# Patient Record
Sex: FEMALE | Race: WHITE | NOT HISPANIC OR LATINO | Employment: PART TIME | URBAN - NONMETROPOLITAN AREA
[De-identification: names, ages, dates, MRNs, and addresses within clinical notes are randomized per-mention and may not be internally consistent; named-entity substitution may affect disease eponyms.]

---

## 2017-01-13 ENCOUNTER — COMMUNICATION - GICH (OUTPATIENT)
Dept: FAMILY MEDICINE | Facility: OTHER | Age: 42
End: 2017-01-13

## 2017-01-13 DIAGNOSIS — E03.9 HYPOTHYROIDISM: ICD-10-CM

## 2017-05-03 ENCOUNTER — HISTORY (OUTPATIENT)
Dept: FAMILY MEDICINE | Facility: OTHER | Age: 42
End: 2017-05-03

## 2017-05-03 ENCOUNTER — OFFICE VISIT - GICH (OUTPATIENT)
Dept: FAMILY MEDICINE | Facility: OTHER | Age: 42
End: 2017-05-03

## 2017-05-03 DIAGNOSIS — Z00.00 ENCOUNTER FOR GENERAL ADULT MEDICAL EXAMINATION WITHOUT ABNORMAL FINDINGS: ICD-10-CM

## 2017-05-03 DIAGNOSIS — E03.8 OTHER SPECIFIED HYPOTHYROIDISM: ICD-10-CM

## 2017-05-03 DIAGNOSIS — E66.09 OTHER OBESITY DUE TO EXCESS CALORIES: ICD-10-CM

## 2017-05-03 DIAGNOSIS — E03.9 HYPOTHYROIDISM: ICD-10-CM

## 2017-05-03 DIAGNOSIS — R73.01 IMPAIRED FASTING GLUCOSE: ICD-10-CM

## 2017-05-03 LAB
GLUCOSE SERPL-MCNC: 96 MG/DL (ref 70–105)
TSH - HISTORICAL: 94.4 UIU/ML (ref 0.34–5.6)

## 2017-05-16 ENCOUNTER — COMMUNICATION - GICH (OUTPATIENT)
Dept: FAMILY MEDICINE | Facility: OTHER | Age: 42
End: 2017-05-16

## 2017-05-17 ENCOUNTER — AMBULATORY - GICH (OUTPATIENT)
Dept: FAMILY MEDICINE | Facility: OTHER | Age: 42
End: 2017-05-17

## 2017-05-17 DIAGNOSIS — R73.01 IMPAIRED FASTING GLUCOSE: ICD-10-CM

## 2017-05-17 DIAGNOSIS — E66.09 OTHER OBESITY DUE TO EXCESS CALORIES: ICD-10-CM

## 2017-07-12 ENCOUNTER — COMMUNICATION - GICH (OUTPATIENT)
Dept: EDUCATION SERVICES | Facility: OTHER | Age: 42
End: 2017-07-12

## 2017-07-15 ENCOUNTER — HISTORY (OUTPATIENT)
Dept: FAMILY MEDICINE | Facility: OTHER | Age: 42
End: 2017-07-15

## 2017-07-15 ENCOUNTER — OFFICE VISIT - GICH (OUTPATIENT)
Dept: FAMILY MEDICINE | Facility: OTHER | Age: 42
End: 2017-07-15

## 2017-07-15 DIAGNOSIS — L03.311 CELLULITIS OF ABDOMINAL WALL: ICD-10-CM
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
## 2017-07-15 LAB
ABSOLUTE BASOPHILS - HISTORICAL: 0.1 THOU/CU MM
ABSOLUTE EOSINOPHILS - HISTORICAL: 0.7 THOU/CU MM
ABSOLUTE IMMATURE GRANULOCYTES(METAS,MYELOS,PROS) - HISTORICAL: 0 THOU/CU MM
ABSOLUTE LYMPHOCYTES - HISTORICAL: 2.9 THOU/CU MM (ref 0.9–2.9)
ABSOLUTE MONOCYTES - HISTORICAL: 0.9 THOU/CU MM
ABSOLUTE NEUTROPHILS - HISTORICAL: 5.2 THOU/CU MM (ref 1.7–7)
BASOPHILS # BLD AUTO: 0.6 %
EOSINOPHIL NFR BLD AUTO: 7.2 %
ERYTHROCYTE [DISTWIDTH] IN BLOOD BY AUTOMATED COUNT: 11.6 % (ref 11.5–15.5)
HCT VFR BLD AUTO: 41.8 % (ref 33–51)
HEMOGLOBIN: 14.5 G/DL (ref 12–16)
IMMATURE GRANULOCYTES(METAS,MYELOS,PROS) - HISTORICAL: 0.2 %
LYMPHOCYTES NFR BLD AUTO: 29.4 % (ref 20–44)
MCH RBC QN AUTO: 30.7 PG (ref 26–34)
MCHC RBC AUTO-ENTMCNC: 34.7 G/DL (ref 32–36)
MCV RBC AUTO: 88 FL (ref 80–100)
MONOCYTES NFR BLD AUTO: 8.9 %
NEUTROPHILS NFR BLD AUTO: 53.7 % (ref 42–72)
PLATELET # BLD AUTO: 278 THOU/CU MM (ref 140–440)
PMV BLD: 9.3 FL (ref 6.5–11)
RED BLOOD COUNT - HISTORICAL: 4.73 MIL/CU MM (ref 4–5.2)
WHITE BLOOD COUNT - HISTORICAL: 9.8 THOU/CU MM (ref 4.5–11)

## 2017-07-18 LAB — LYME SCREEN W/REFLEX WEST BLOT - HISTORICAL: NEGATIVE

## 2017-07-19 LAB
ANAPLASMA PHAGOCYTOPHILUM - HISTORICAL: NEGATIVE
EHRLICHIA CHAFFEENSIS - HISTORICAL: NEGATIVE
EHRLICHIA EWINGII/CANIS - HISTORICAL: NEGATIVE
EHRLICHIA MURIS-LIKE - HISTORICAL: NEGATIVE

## 2017-07-20 ENCOUNTER — OFFICE VISIT - GICH (OUTPATIENT)
Dept: FAMILY MEDICINE | Facility: OTHER | Age: 42
End: 2017-07-20

## 2017-07-20 ENCOUNTER — HISTORY (OUTPATIENT)
Dept: FAMILY MEDICINE | Facility: OTHER | Age: 42
End: 2017-07-20

## 2017-07-20 ENCOUNTER — AMBULATORY - GICH (OUTPATIENT)
Dept: FAMILY MEDICINE | Facility: OTHER | Age: 42
End: 2017-07-20

## 2017-07-20 DIAGNOSIS — N39.0 URINARY TRACT INFECTION: ICD-10-CM

## 2017-07-20 DIAGNOSIS — E66.3 OVERWEIGHT: ICD-10-CM

## 2017-07-20 DIAGNOSIS — R73.02 IMPAIRED GLUCOSE TOLERANCE: ICD-10-CM

## 2017-07-20 DIAGNOSIS — M54.50 LOW BACK PAIN: ICD-10-CM

## 2017-07-20 LAB
BACTERIA URINE: NORMAL BACTERIA/HPF
BILIRUB UR QL: NEGATIVE
CLARITY, URINE: ABNORMAL CLARITY
COLOR UR: YELLOW COLOR
EPITHELIAL CELLS: NORMAL EPI/HPF
GLUCOSE URINE: NEGATIVE MG/DL
KETONES UR QL: NEGATIVE MG/DL
LEUKOCYTE ESTERASE URINE: ABNORMAL
NITRITE UR QL STRIP: NEGATIVE
OCCULT BLOOD,URINE - HISTORICAL: ABNORMAL
PH UR: 6 [PH]
PROTEIN QUALITATIVE,URINE - HISTORICAL: NEGATIVE MG/DL
RBC - HISTORICAL: NORMAL /HPF
SP GR UR STRIP: 1.02
UROBILINOGEN,QUALITATIVE - HISTORICAL: NORMAL EU/DL
WBC - HISTORICAL: NORMAL /HPF

## 2017-08-03 ENCOUNTER — HISTORY (OUTPATIENT)
Dept: FAMILY MEDICINE | Facility: OTHER | Age: 42
End: 2017-08-03

## 2017-08-03 ENCOUNTER — OFFICE VISIT - GICH (OUTPATIENT)
Dept: FAMILY MEDICINE | Facility: OTHER | Age: 42
End: 2017-08-03

## 2017-08-03 DIAGNOSIS — M54.50 LOW BACK PAIN: ICD-10-CM

## 2017-08-03 LAB
BACTERIA URINE: ABNORMAL BACTERIA/HPF
BILIRUB UR QL: NEGATIVE
CLARITY, URINE: CLEAR CLARITY
COLOR UR: YELLOW COLOR
EPITHELIAL CELLS: ABNORMAL EPI/HPF
GLUCOSE URINE: NEGATIVE MG/DL
KETONES UR QL: NEGATIVE MG/DL
LEUKOCYTE ESTERASE URINE: ABNORMAL
NITRITE UR QL STRIP: NEGATIVE
OCCULT BLOOD,URINE - HISTORICAL: ABNORMAL
PH UR: 7 [PH]
PROTEIN QUALITATIVE,URINE - HISTORICAL: NEGATIVE MG/DL
RBC - HISTORICAL: ABNORMAL /HPF
SP GR UR STRIP: 1.02
UROBILINOGEN,QUALITATIVE - HISTORICAL: NORMAL EU/DL
WBC - HISTORICAL: ABNORMAL /HPF

## 2017-08-03 ASSESSMENT — ANXIETY QUESTIONNAIRES
4. TROUBLE RELAXING: NOT AT ALL
5. BEING SO RESTLESS THAT IT IS HARD TO SIT STILL: NOT AT ALL
3. WORRYING TOO MUCH ABOUT DIFFERENT THINGS: NOT AT ALL
1. FEELING NERVOUS, ANXIOUS, OR ON EDGE: NOT AT ALL
6. BECOMING EASILY ANNOYED OR IRRITABLE: SEVERAL DAYS
2. NOT BEING ABLE TO STOP OR CONTROL WORRYING: NOT AT ALL
GAD7 TOTAL SCORE: 1
7. FEELING AFRAID AS IF SOMETHING AWFUL MIGHT HAPPEN: NOT AT ALL

## 2017-08-03 ASSESSMENT — PATIENT HEALTH QUESTIONNAIRE - PHQ9: SUM OF ALL RESPONSES TO PHQ QUESTIONS 1-9: 6
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
## 2017-12-27 NOTE — PROGRESS NOTES
Patient Information     Patient Name MRN Sex Mallorie Winters [id number] Female 1975      Progress Notes by Laura Fournier NP at 7/15/2017  3:45 PM     Author:  Laura Fournier NP Service:  (none) Author Type:  PHYS- Nurse Practitioner     Filed:  2017  8:39 PM Encounter Date:  7/15/2017 Status:  Signed     :  Laura Fournier NP (PHYS- Nurse Practitioner)            HPI:    Mallorie Machado is a 41 y.o. female who presents to clinic today for what she believes is a bug bite she got about 5 days ago, initially it was about the size of a pencil eraser. Seemed to get better and was not aware of it. Today awoke with pain and some sense of itchiness. Rates pain at 8/10, is very uncomfortable. No fever or chills. No body aches, has had a headache for the last 4 days, which is unusual for her. Has taken Excedrin and ibuprofen for the headache and today also the bug bite.  Is not sure what kind of bug, did not feel initially it was a tick bite. Has not been exposed to any bats or larger outdoor animals. Has not been camping, [place]. Does have a slight sore throat. No cough. Slight nausea today but taking fluids well. Is keeping everything down. Also reports fatigue.     Past Medical History:     Diagnosis  Date     Allergic rhinitis due to pollen      Back injury     Cervical disc injury, C5-6, with improvement (work comp related)      Degeneration of cervical intervertebral disc      Dermatitis     scalp      Displacement of cervical intervertebral disc without myelopathy      Dysmenorrhea      Dysthymic disorder      Endometriosis, site unspecified      History of delivery     G2, P2-0-0-2, vaginal deliveries      Lumbago      Obesity      Ovarian cyst rupture      Unspecified asthma      Unspecified hypothyroidism      Past Surgical History:      Procedure  Laterality Date     ARTHROSCOPY  06    left wrist arthroscopy with debridement - Dr. Mahmood,  [facility]        CERVICAL FUSION  2008    C5-6       LAPAROSCOPY ABDOMEN DIAGNOSTIC  6/04 02/07       REMOVAL OF FOREIGN BODY  1/19/2012    ACDF C4-5 and C6-7 with hardware removal [Other][[[       ERIKA AND BSO  11/04    bilateral salpingo-oophorectomy       VAGINAL DELIVERY   x 2     Social History      Substance Use Topics        Smoking status:  Current Every Day Smoker     Types: Cigarettes     Smokeless tobacco:  Never Used     Alcohol use  No     Current Outpatient Prescriptions       Medication  Sig Dispense Refill     benzonatate (TESSALON PERLES) 100 mg capsule Take 1 capsule by mouth 3 times daily if needed for Cough. 30 capsule 0     levothyroxine (SYNTHROID) 100 mcg tablet Take 1 tablet by mouth before breakfast. 90 tablet 3     PROAIR HFA 90 mcg/actuation inhaler INHALE 2 PUFFS AS NEEDED FOR ASTHMA UP TO 4 TIMES DAILY 1 Inhaler 0     No current facility-administered medications for this visit.      Medications have been reviewed by me and are current to the best of my knowledge and ability.    Allergies      Allergen   Reactions     Hydrocodone-Acetaminophen  Itching and Nausea Only     Meperidine  Itching     No hives        Morphine  Erythema     Face & hands      Oxycodone-Acetaminophen  Itching     Propoxyphene-Acetaminophen  Throat Swelling/Closing       ROS:  Refer to HPI, otherwise negative.    EXAM:  General appearance: well appearing lady in no acute distress  Head: normocephalic, atraumatic  Eyes: conjunctivae normal  Orophayrnx: moist mucous membranes, tonsils without erythema or hypertrophy, no exudate or petechia, no post nasal drip.    Neck: supple without adenopathy  Respiratory: clear to auscultation bilaterally  Cardiac: RRR with no murmurs  Musculoskeletal:No inflamed joints.   Dermatological: 5x2 cm area of erythema of left abdomen, also slightly swollen and warm, area is non flocculent. No drainage. 2 very small 1 mm central spots, healed but possibly insect bite.    Psychological: normal affect, alert and pleasant  Results for orders placed or performed in visit on 07/15/17      LYME SCREEN W/REFLEX      Result  Value Ref Range    LYME SCREEN W/REFLEX WEST BLOT Negative Negative   ANAPLASMA      Result  Value Ref Range    ANAPLASMA PHAGOCYTOPHILUM Negative Negative    EHRLICHIA CHAFFEENSIS Negative Negative    EHRLICHIA EWINGII/CANIS Negative Negative    EHRLICHIA MURIS-LIKE Negative Negative   CBC WITH AUTO DIFFERENTIAL      Result  Value Ref Range    WHITE BLOOD COUNT         9.8 4.5 - 11.0 thou/cu mm    RED BLOOD COUNT           4.73 4.00 - 5.20 mil/cu mm    HEMOGLOBIN                14.5 12.0 - 16.0 g/dL    HEMATOCRIT                41.8 33.0 - 51.0 %    MCV                       88 80 - 100 fL    MCH                       30.7 26.0 - 34.0 pg    MCHC                      34.7 32.0 - 36.0 g/dL    RDW                       11.6 11.5 - 15.5 %    PLATELET COUNT            278 140 - 440 thou/cu mm    MPV                       9.3 6.5 - 11.0 fL    NEUTROPHILS               53.7 42.0 - 72.0 %    LYMPHOCYTES               29.4 20.0 - 44.0 %    MONOCYTES                 8.9 <12.0 %    EOSINOPHILS               7.2 <8.0 %    BASOPHILS                 0.6 <3.0 %    IMMATURE GRANULOCYTES(METAS,MYELOS,PROS) 0.2 %    ABSOLUTE NEUTROPHILS      5.2 1.7 - 7.0 thou/cu mm    ABSOLUTE LYMPHOCYTES      2.9 0.9 - 2.9 thou/cu mm    ABSOLUTE MONOCYTES        0.9 (H) <0.9 thou/cu mm    ABSOLUTE EOSINOPHILS      0.7 (H) <0.5 thou/cu mm    ABSOLUTE BASOPHILS        0.1 <0.3 thou/cu mm    ABSOLUTE IMMATURE GRANULOCYTES(METAS,MYELOS,PROS) 0.0 <=0.3 thou/cu mm         ASSESSMENT/PLAN:    ICD-10-CM    1. Bug bite with infection, initial encounter W57.XXXA LYME SCREEN W/REFLEX      ANAPLASMA      LYME SCREEN W/REFLEX      ANAPLASMA      CBC WITH DIFFERENTIAL      doxycycline (VIBRAMYCIN) 100 mg capsule      CBC WITH DIFFERENTIAL      CBC WITH AUTO DIFFERENTIAL   2. Cellulitis, abdominal wall L03.311  doxycycline (VIBRAMYCIN) 100 mg capsule       Plan: Probable insect bit of undetermined nature with subsequent cellulitis/infection.   Discussed with pt, would like to do Lyme and anaplasmosis testing.   Also will get CBC, Rx cellulitis with doxycycline 100 mg BID x 10 days.   Area marked and instructed pt to return to PCP if area is spreading at 2-3 days following antibiotic initiation or if develops new symptoms.   Instructed to follow up with PCP next week also.   See patient instructions. Pt in agreements with plan.  Discussed expected course, Signs and symptoms to return to PCP.   No further questions.       Patient Instructions   Take the antibiotic as prescribed. Antibiotic has been sent to pharmacy. Please take full course of antibiotic even if symptoms have completely resolved. This helps prevent against antibiotic resistance.     This is from a bug bite of some sort so we are treating this local infection with doxycyline 100 mg twice a day for 10 days. This will treat you for anaplasmosis and also Lyme.     Someone will call you with the results if they are positive. I will call you if the blood count is indicating a concern.     Watch for any signs of increasing infection (redness, pus, increased pain or redness, increased swelling or fever). Call if any of these signs occur. Monitor for fevers or chills.    You may draw line around area of redness to monitor area. Please return to clinic if redness is continuing to spread after 2-3 days and follow up with your primary care provider next week if it is not resolving.     For the discomfort you can take tylenol 2 tabs up to 4 times per day. You can also take ibuprofen 2-3 tabs up to 4 times per day, take this with food.     Call or return to clinic as needed if your symptoms worsen including headache, nausea muscle or joint pain etc, or if you fail to improve as anticipated.

## 2017-12-28 NOTE — PROGRESS NOTES
Patient Information     Patient Name MRN Sex Mallorie Winters [id number] Female 1975      Progress Notes by Alyssa Jovel MD at 2017 10:15 AM     Author:  Alyssa Jovel MD Service:  (none) Author Type:  Physician     Filed:  2017  8:34 AM Encounter Date:  2017 Status:  Signed     :  Alyssa Jovel MD (Physician)            Subjective:   Mallorie Machado is a 41 y.o. female c/o urinary symptoms for the past 4 days including:dysuria and flank pain bilaterally She denies: hematuria, incontinence, fever, chills, nausea, vomiting and diarrhea.   Additional hx:   currently pregnant: no  diabetic:no  immunosuppressed: no  Known renal abnormality: no  >4 UTI in past year:no  failure of ABX therapy for UTI in past 4 weeks:no  Use of bubble bath: no  No LMP recorded. Patient has had a hysterectomy.    Current Outpatient Prescriptions:      ciprofloxacin HCl (CIPRO) 250 mg tablet, Take 1 tablet by mouth 2 times daily for 5 days., Disp: 10 tablet, Rfl: 0     doxycycline (VIBRAMYCIN) 100 mg capsule, Take 1 capsule by mouth 2 times daily for 10 days., Disp: 20 capsule, Rfl: 0     levothyroxine (SYNTHROID) 100 mcg tablet, Take 1 tablet by mouth before breakfast., Disp: 90 tablet, Rfl: 3     PROAIR HFA 90 mcg/actuation inhaler, INHALE 2 PUFFS AS NEEDED FOR ASTHMA UP TO 4 TIMES DAILY, Disp: 1 Inhaler, Rfl: 0  Medications have been reviewed by me and are current to the best of my knowledge and ability.    Allergies as of 2017 - Sarath as Reviewed 2017      Allergen  Reaction Noted     Hydrocodone-acetaminophen Itching and Nausea Only 2012     Meperidine Itching 2012     Morphine Erythema 2012     Oxycodone-acetaminophen Itching 2012     Propoxyphene-acetaminophen Throat Swelling/Closing 2012       Objective:   Blood pressure 108/60, pulse 66, weight 69.9 kg (154 lb).  General appearance: healthy, alert and cooperative  Abdominal exam:  abdomen is soft without significant tenderness, masses, organomegaly or guarding    Recent Labs       07/20/17   1022   COLOR  Yellow   CLARITY  Slightly Cloudy A   SPECGRAV  1.025   PHURINE  6.0   UROBILINOGEN  Normal   PROTEINUA  Negative       Recent Labs       07/20/17   1022   GLUCOSEUA  Negative   KETONESUA  Negative   BILIURINE  Negative   BLOODUA  Small A   NITRITE  Negative   LEUKOCYTE  Small A       Assessment:     ICD-10-CM    1. Acute low back pain, unspecified back pain laterality, with sciatica presence unspecified M54.5 URINALYSIS W REFLEX MICROSCOPIC IF POSITIVE      URINALYSIS W REFLEX MICROSCOPIC IF POSITIVE      URINALYSIS MICROSCOPIC      URINALYSIS MICROSCOPIC   2. Urinary tract infection, site unspecified N39.0 ciprofloxacin HCl (CIPRO) 250 mg tablet         Plan:   Medications and lab tests as per orders.   Symptomatic treatment with increased fluids and tylenol over the counter as directed, if desired. Will call pt with culture results (if ordered as above) when available. Pt advised to call the clinic if she has not been notified of results in 2 days.  Repeat urinalysis is not needed, future orders placed accordingly.    Alyssa Posey MD  8:34 AM 7/21/2017

## 2017-12-28 NOTE — PATIENT INSTRUCTIONS
Patient Information     Patient Name MRN Mallorie Rehman [id number] Female 1975      Patient Instructions by Laura Fournier NP at 7/15/2017  3:45 PM     Author:  Laura Fournier NP  Service:  (none) Author Type:  PHYS- Nurse Practitioner     Filed:  7/15/2017  4:45 PM  Encounter Date:  7/15/2017 Status:  Addendum     :  Laura Fournier NP (PHYS- Nurse Practitioner)        Related Notes: Original Note by Laura Fournier NP (PHYS- Nurse Practitioner) filed at 7/15/2017  4:36 PM            Take the antibiotic as prescribed. Antibiotic has been sent to pharmacy. Please take full course of antibiotic even if symptoms have completely resolved. This helps prevent against antibiotic resistance.     This is from a bug bite of some sort so we are treating this local infection with doxycyline 100 mg twice a day for 10 days. This will treat you for anaplasmosis and also Lyme.     Someone will call you with the results if they are positive. I will call you if the blood count is indicating a concern.     Watch for any signs of increasing infection (redness, pus, increased pain or redness, increased swelling or fever). Call if any of these signs occur. Monitor for fevers or chills.    You may draw line around area of redness to monitor area. Please return to clinic if redness is continuing to spread after 2-3 days and follow up with your primary care provider next week if it is not resolving.     For the discomfort you can take tylenol 2 tabs up to 4 times per day. You can also take ibuprofen 2-3 tabs up to 4 times per day, take this with food.     Call or return to clinic as needed if your symptoms worsen including headache, nausea muscle or joint pain etc, or if you fail to improve as anticipated.

## 2017-12-28 NOTE — PROGRESS NOTES
Patient Information     Patient Name MRN Mallorie Rehman [id number] Female 1975      Progress Notes by Klinefelter, Kristin K, RD at 2017  2:00 PM     Author:  Klinefelter, Kristin K, RD Service:  (none) Author Type:  NUTR- Registered Dietitian     Filed:  2017 10:17 AM Encounter Date:  2017 Status:  Signed     :  Klinefelter, Kristin K, RD (NUTR- Registered Dietitian)            MEDICAL NUTRITION THERAPY  SUBSEQUENT VISIT    Assessment:  Client History: Mallorie is here to review her goals for weight managment. At our last visit, her goals were:    Goal: (1) Reduce fat in diet to 42 grams per day. Met 95% of the time. She has significantly changed the foods she choose. She is shopping, cooking, and eating at work differently. She feels these are changes that she can sustain.    (2) 150 minutes of exercise per day.  Met 75% of the time. Barriers include recently being bit by a spider and feeling ill from that.  She has a plan for exercising going into the winter.    (3) document food choices and fat grams. She is keeping track in her head and can list several food items and the fat content. She is now starting to look at sugar and carbs.    (4) eat bfast for metabolism. She has met 100% of the time and says she can continue this.    Weight today: 154# which is down from 171#  Weight loss of 9% is ideal.    Previous Nutrition Diagnosis: Resolved  Energy Balance: Predicted excessive energy intake related to choices and portions as evidenced by recall    Improving nutrition and lifestyle choices as evidenced by reduced weight/BMI, increased activity and eating more frequent, small meals..    New Nutrition Diagnosis:  At risk for diabetes. BMI 27 is improving.  Patient is in action stage of improving her diet and exercise to lose weight.    Intervention:  Meals and Snacks:  General/healthful diet:  Modify composition of meals/snacks: Energy-modified diet  Fat-modified diet  Nutrition  Counseling:  Strategies:  Goal setting  Self-monitoring  Goal: (1) continue 35-42 grams of fat per day.   (2) CHO 30 grams per meal  (3) 150-200 min of exercise per week  (4) weigh weekly. Goal 145-147#          Monitoring and Evaluation:  Food Intake  Weight  Biochemical Data, Medical Tests and Procedures    Follow Up: as needed    Time spent with patient: 30 minutes.  Patient encouraged to call with further questions. Contact information provided.    Kristin K Klinefelter, RD ....................  7/20/2017   10:11 AM

## 2017-12-30 NOTE — NURSING NOTE
Patient Information     Patient Name MRN Mallorie Rehman [id number] Female 1975      Nursing Note by Caroline Oliva at 7/15/2017  3:45 PM     Author:  Caroline Oliva Service:  (none) Author Type:  (none)     Filed:  7/15/2017  4:17 PM Encounter Date:  7/15/2017 Status:  Signed     :  Caroline Oliva            Patient presents to the clinic for a bug bite on her left hip that she noticed about a week ago. She reports painful, tender and burning sensations.  Caroline BIGGS, JOURDAN.......7/15/2017..3:45 PM

## 2017-12-30 NOTE — NURSING NOTE
Patient Information     Patient Name MRN Mallorie Rehman [id number] Female 1975      Nursing Note by Nahed Thomason at 2017 10:15 AM     Author:  Nahed Thomason Service:  (none) Author Type:  (none)     Filed:  2017 10:52 AM Encounter Date:  2017 Status:  Signed     :  Nahed Thomason            Patient is here for concerns of low back pain after going to bathroom. Started Saturday.   Nahed Thomason LPN .............2017  10:12 AM

## 2018-01-02 NOTE — TELEPHONE ENCOUNTER
Patient Information     Patient Name MRN Mallorie Rehman [id number] Female 1975      Telephone Encounter by Georgette Pappas RN at 2017 10:58 AM     Author:  Georgette Pappas RN Service:  (none) Author Type:  NURS- Registered Nurse     Filed:  2017 11:02 AM Encounter Date:  2017 Status:  Signed     :  Georgette Pappas RN (NURS- Registered Nurse)            Patient is due for medication management appointment. Letter was previously sent for reminder to patient. Unable to reach patient via telephone. Left message to call back  ....................Georgette Pappas RN  2017   11:02 AM

## 2018-01-03 NOTE — TELEPHONE ENCOUNTER
Patient Information     Patient Name MRN Mallorie Rehman [id number] Female 1975      Telephone Encounter by Georgette Pappas RN at 2017  3:54 PM     Author:  Georgette Pappas RN Service:  (none) Author Type:  NURS- Registered Nurse     Filed:  2017  3:56 PM Encounter Date:  2017 Status:  Signed     :  Georgette Pappas RN (NURS- Registered Nurse)            Unable to reach patient by phone. Patient is due for follow up. Letter sent with no response. Routing to provider for consideration.    This is a Refill request from: [facility]   Name of Medication: Levothyroxine  Quantity requested: 30  Last fill date: 2016  Due for refill: yes  Last visit with JERARDO NAVARRETE was on: 2016 in [facility]  PCP:  Jerardo Navarrete MD  Controlled Substance Agreement:  n/a   Diagnosis r/t this medication request: Hypothyroidism     Unable to complete prescription refill per RN Medication Refill Policy.................... Georgette Pappas RN ....................  2017   3:55 PM

## 2018-01-04 NOTE — NURSING NOTE
Patient Information     Patient Name MRN Mallorie Rehman [id number] Female 1975      Nursing Note by Bhavya Llanos at 5/3/2017  9:45 AM     Author:  Bhavya Llanos Service:  (none) Author Type:  (none)     Filed:  5/3/2017  9:52 AM Encounter Date:  5/3/2017 Status:  Signed     :  Bhavya Llanos            Coming for a Physical  Bhavya Llanos ....................  5/3/2017   9:41 AM

## 2018-01-04 NOTE — PROGRESS NOTES
Patient Information     Patient Name MRN Sex Mallorie Winters [id number] Female 1975      Progress Notes by Jerardo Navarrete MD at 5/3/2017  9:54 AM     Author:  Jerardo Navarrete MD Service:  (none) Author Type:  Physician     Filed:  5/3/2017 12:39 PM Encounter Date:  5/3/2017 Status:  Signed     :  Jerardo Navarrete MD (Physician)              SUBJECTIVE:    Mallorie Machado is a 41 y.o. female who presents for px    HPI: Wants to look into weight loss.  Has tried exercise, herbalife and brief diet changes.  Stopped her thyroid medications a month ago or so.  Has never met a dietician.    PROBLEM LIST:  Patient Active Problem List     Diagnosis  Code     ASTHMA, INTERMITTENT J45.909     ASTHMA, INTERMITTENT J45.909     OTITIS MEDIA, RIGHT H66.90     TICK BITE W57.XXXA     ALLERGIC RHINITIS, SEASONAL J30.1     DERMATITIS, SCALP L25.9     BACK PAIN, LUMBAR M54.5     DYSMENORRHEA N94.6     WRIST PAIN, LEFT M25.539     ANXIETY DEPRESSION F34.1     HYPOTHYROIDISM E03.9     OBESITY E66.9     DEGENERATIVE DISC DISEASE, CERVICAL SPINE M50.30     HERNIATED CERVICAL DISC M50.20     ENDOMETRIOSIS N80.9     Trigeminal neuritis G50.8     Impaired fasting glucose R73.01     PAST MEDICAL HISTORY:  Past Medical History:     Diagnosis  Date     Allergic rhinitis due to pollen      Back injury     Cervical disc injury, C5-6, with improvement (work comp related)      Degeneration of cervical intervertebral disc      Dermatitis     scalp      Displacement of cervical intervertebral disc without myelopathy      Dysmenorrhea      Dysthymic disorder      Endometriosis, site unspecified      History of delivery     G2, P2-0-0-2, vaginal deliveries      Lumbago      Obesity      Ovarian cyst rupture      Unspecified asthma      Unspecified hypothyroidism      SURGICAL HISTORY:  Past Surgical History:      Procedure  Laterality Date     ARTHROSCOPY  06    left wrist arthroscopy with debridement - Dr. Mahmood,  [facility]        CERVICAL FUSION  2008    C5-6       LAPAROSCOPY ABDOMEN DIAGNOSTIC         REMOVAL OF FOREIGN BODY  2012    ACDF C4-5 and C6-7 with hardware removal [Other][[[       ERIKA AND BSO      bilateral salpingo-oophorectomy       VAGINAL DELIVERY   x 2       SOCIAL HISTORY:  Social History     Social History        Marital status:       Spouse name: N/A     Number of children:  N/A     Years of education:  N/A     Occupational History      Not on file.     Social History Main Topics        Smoking status:  Current Every Day Smoker     Types: Cigarettes     Smokeless tobacco:  Never Used     Alcohol use  No     Drug use:  No     Sexual activity:  Not on file     Other Topics  Concern     Not on file      Social History Narrative     Quit tobacco 2007 using Chantix.       .      Works as an N[employer]. at [employer]. (not working due to neck injury)     Has two children.     Preloaded 12     FAMILY HISTORY:  Family History       Problem   Relation Age of Onset     Diabetes  Mother      Type II       Other  Mother      hypothyroidism       Diabetes  Sister      DM-I,  of splenic injury/infarct        CURRENT MEDICATIONS:   Current Outpatient Prescriptions       Medication  Sig Dispense Refill     benzonatate (TESSALON PERLES) 100 mg capsule Take 1 capsule by mouth 3 times daily if needed for Cough. 30 capsule 0     levothyroxine (SYNTHROID) 100 mcg tablet TAKE 1 TABLET BY MOUTH BEFORE BREAKFAST 30 tablet 0     PROAIR HFA 90 mcg/actuation inhaler INHALE 2 PUFFS AS NEEDED FOR ASTHMA UP TO 4 TIMES DAILY 1 Inhaler 0     No current facility-administered medications for this visit.      Medications have been reviewed by me and are current to the best of my knowledge and ability.    ALLERGIES:  Hydrocodone-acetaminophen; Meperidine; Morphine; Oxycodone-acetaminophen; and Propoxyphene-acetaminophen     REVIEW OF SYSTEMS:  General: denies any general problems.  Eyes: denies  "problems  Ears/Nose/Throat: denies problems  Cardiovascular: denies problems  Respiratory: denies problems  Gastrointestinal: denies problems  Genitourinary: denies problems  Musculoskeletal: denies problems  Skin: denies problems  Neurologic: denies problems  Psychiatric: denies problems  Endocrine: denies problems  Heme/Lymphatic: denies problems  Allergic/Immunologic: denies problems  PHQ Depression Screening 5/3/2017   Date of PHQ exam (doc flow) 5/3/2017   1. Lack of interest/pleasure 0 - Not at all   2. Feeling down/depressed 0 - Not at all   PHQ-2 TOTAL SCORE 0   3. Trouble sleeping -   4. Decreased energy -   5. Appetite change -   6. Feelings of failure -   7. Trouble concentrating -   8. Activity level -   9. Hurting yourself -   PHQ-9 TOTAL SCORE -   PHQ-9 Severity Level -   Functional Impairment -      OBJECTIVE:  /62  Pulse 60  Resp 16  Ht 1.607 m (5' 3.25\")  Wt 77.6 kg (171 lb)  BMI 30.05 kg/m2  EXAM:   General Appearance: Pleasant, alert, appropriate appearance for age. No acute distress  Head Exam: Normal. Normocephalic, atraumatic.  Eye Exam:  Normal external eye, conjunctiva, lids, cornea. THANIA.  Ear Exam: Normal TM's bilaterally. Normal auditory canals and external ears. Non-tender.  Nose Exam: Normal external nose, mucus membranes, and septum.  OroPharynx Exam:  Dental hygiene adequate. Normal buccal mucose. Normal pharynx.  Neck Exam:  Supple, no masses or nodes.  Thyroid Exam: No nodules or enlargement.  Chest/Respiratory Exam: Normal chest wall and respirations. Clear to auscultation.  Breast Exam: No dimpling, nipple retraction or discharge. No masses or nodes.  Cardiovascular Exam: Regular rate and rhythm. S1, S2, no murmur, click, gallop, or rubs.  Gastrointestinal Exam: Soft, non-tender, no masses or organomegaly.  Genitourinary Exam Female: External genitalia, vulva and vagina appear normal. Bimanual exam reveals normal uterus and adnexa, nontender urethra and bladder. Clinical " staff offered to be present for exam: Patient declined  Lymphatic Exam: Non-palpable nodes in neck, clavicular, axillary, or inguinal regions.  Musculoskeletal Exam: Back is straight and non-tender, full ROM of upper and lower extremities.  Foot Exam: Left and right foot: good pedal pulses, no lesions, nail hygiene good.  Skin: no rash or abnormalities  Neurologic Exam: Nonfocal, symmetric DTRs, normal gross motor, tone coordination and no tremor.  Psychiatric Exam: Alert and oriented - appropriate affect.    ASSESSMENT/PLAN    ICD-10-CM    1. Routine general medical examination at a health care facility Z00.00 GYN THIN PREP PAP SCREEN IMAGED   2. Other specified hypothyroidism E03.8 TSH   3. Impaired fasting glucose R73.01 GLUCOSE, FASTING      AMB CONSULT TO DIETICIAN   4. Non morbid obesity due to excess calories E66.09 AMB CONSULT TO DIETICIAN     Ms. Kong Body mass index is 30.05 kg/(m^2). This is out of the normal range for a 41 y.o. Normal range for ages 18+ is between 18.5 and 24.9. To lose weight we reviewed risks and benefits of appropriate options such as diet, exercise, and medications. Patient's strategy will be  formal nutrition program  BP Readings from Last 1 Encounters:05/03/17 : 120/62  Ms. Kong blood pressure is out of the normal range for adults. Per JNC-8 guidelines normal adult blood pressure is < 120/80, pre-hypertensive is between 120/80 and 139/89, and hypertension is 140/90 or greater. Risks of hypertension were discussed. Patient's strategy will be to recheck blood pressure in 12 months      Follow up TSH in 8 weeks, since she has been off medications for over 1 month now.    Jerardo Navarrete MD ....................  5/3/2017   12:38 PM

## 2018-01-05 NOTE — PROGRESS NOTES
"Patient Information     Patient Name MRN Mallorie Rehman [id number] Female 1975      Progress Notes by Klinefelter, Kristin K, RD at 2017 11:00 AM     Author:  Klinefelter, Kristin K, RD Service:  (none) Author Type:  NUTR- Registered Dietitian     Filed:  2017  1:49 PM Encounter Date:  2017 Status:  Signed     :  Klinefelter, Kristin K, RD (NUTR- Registered Dietitian)            MEDICAL NUTRITION THERAPY  INITIAL VISIT      Provider: Dr. Navarrete    Reason for referral: Impaired fasting glucose, overweight BMI 30    Assessment:  Client History: Mallorie would like to prevent DM and lose weight  Estimated energy needs: 1,600 kcal/day  Estimated protein needs: 65 gm/day   She has a family Hx of Type 2 DM (mother and uncle). Has lost weight in the past with herbalife. Does not follow a planned exercise routine. She works 4 days per week as a  and feels too tired after being on her feet. Lives at home with daughter and . Mallorie does the cooking and shopping. They do eat out several times per week including eating at [employer] where she works.     She reports that she is  A \"picky eater\" and does not like veggies.    24hr Diet Recall:  Time: Food/Drink:   bfast None, coffee   lunch At work, burger, eggs/schaeffer, stuffed hasbrowns.   dinner Out (example: [employer]es burger for 85 grams of fat)   snacks Some at home, chips, fruit   beverages 10 oz of milk per week, coffee w/ cream, water     Weekly Activity:  Cardio: Resistance: Stretching:   none none none      Nutrition Diagnosis:  Energy Balance: Excessive energy intake related to estimated needs for age and activity level as evidenced by recall, bmi .    Intervention:  Nutrition Prescription  We discussed portion control at length today. She was unaware of how many fat grams she was taking in. She verbalized understanding and demonstrated knowledge of how to count fat grams for an overall well-rounded diet.  Nutrition " Intervention: Meals and Snacks:  General/healthful diet:  Modify composition of meals/snacks: Energy-modified diet  Fat-modified diet  Nutrition Counseling:  Strategies:  Goal setting  Self-monitoring  Goal: (1) Reduce fat in diet to 42 grams per day  (2) 150 minutes of exercise per day  (3) document food choices and fat grams  (4) eat bfast for metabolism    Education Materials Provided:   AND 1500 calorie meal plan  Food lists  Recipe Ideas  CDC Fat  Worksheets    Monitoring and Evaluation:  Food Intake  Food and Nutrition Knowledge/Skill  Weight    Follow Up: 3 week(s)    Time spent with patient: 60 minutes.   Patient encouraged to call with further questions. Contact information provided.    Kristin K Klinefelter, RD ....................  5/17/2017   1:42 PM

## 2018-01-27 VITALS
HEIGHT: 63 IN | DIASTOLIC BLOOD PRESSURE: 62 MMHG | WEIGHT: 171 LBS | HEART RATE: 60 BPM | RESPIRATION RATE: 16 BRPM | SYSTOLIC BLOOD PRESSURE: 120 MMHG | BODY MASS INDEX: 30.3 KG/M2

## 2018-01-27 VITALS
BODY MASS INDEX: 28.41 KG/M2 | HEIGHT: 62 IN | DIASTOLIC BLOOD PRESSURE: 66 MMHG | TEMPERATURE: 97.8 F | HEART RATE: 68 BPM | WEIGHT: 154.4 LBS | SYSTOLIC BLOOD PRESSURE: 108 MMHG

## 2018-01-27 VITALS
BODY MASS INDEX: 27.9 KG/M2 | HEART RATE: 60 BPM | WEIGHT: 153.8 LBS | DIASTOLIC BLOOD PRESSURE: 74 MMHG | SYSTOLIC BLOOD PRESSURE: 114 MMHG | TEMPERATURE: 97.5 F

## 2018-01-27 VITALS
BODY MASS INDEX: 27.94 KG/M2 | DIASTOLIC BLOOD PRESSURE: 60 MMHG | WEIGHT: 154 LBS | SYSTOLIC BLOOD PRESSURE: 108 MMHG | HEART RATE: 66 BPM

## 2018-01-27 VITALS — WEIGHT: 154 LBS

## 2018-01-30 ENCOUNTER — DOCUMENTATION ONLY (OUTPATIENT)
Dept: FAMILY MEDICINE | Facility: OTHER | Age: 43
End: 2018-01-30

## 2018-01-30 PROBLEM — F34.1 DYSTHYMIC DISORDER: Status: ACTIVE | Noted: 2018-01-30

## 2018-01-30 PROBLEM — J30.1 ALLERGIC RHINITIS DUE TO POLLEN: Status: ACTIVE | Noted: 2018-01-30

## 2018-01-30 PROBLEM — M54.50 LUMBAGO: Status: ACTIVE | Noted: 2018-01-30

## 2018-01-30 PROBLEM — E66.9 OBESITY: Status: ACTIVE | Noted: 2018-01-30

## 2018-01-30 PROBLEM — M25.539 PAIN IN JOINT, FOREARM: Status: ACTIVE | Noted: 2018-01-30

## 2018-01-30 PROBLEM — N94.6 DYSMENORRHEA: Status: ACTIVE | Noted: 2018-01-30

## 2018-01-30 PROBLEM — E03.9 HYPOTHYROIDISM: Status: ACTIVE | Noted: 2018-01-30

## 2018-01-30 PROBLEM — L25.9 CONTACT DERMATITIS AND ECZEMA: Status: ACTIVE | Noted: 2018-01-30

## 2018-01-30 RX ORDER — LEVOTHYROXINE SODIUM 100 UG/1
100 TABLET ORAL
COMMUNITY
Start: 2017-05-03 | End: 2018-05-24

## 2018-01-30 RX ORDER — ALBUTEROL SULFATE 90 UG/1
2 AEROSOL, METERED RESPIRATORY (INHALATION) 4 TIMES DAILY PRN
COMMUNITY
Start: 2016-08-17 | End: 2018-06-15

## 2018-01-30 RX ORDER — MELOXICAM 15 MG/1
15 TABLET ORAL DAILY
COMMUNITY
Start: 2017-08-03 | End: 2018-06-15

## 2018-02-03 ASSESSMENT — PATIENT HEALTH QUESTIONNAIRE - PHQ9: SUM OF ALL RESPONSES TO PHQ QUESTIONS 1-9: 6

## 2018-02-03 ASSESSMENT — ANXIETY QUESTIONNAIRES: GAD7 TOTAL SCORE: 1

## 2018-03-02 ENCOUNTER — OFFICE VISIT (OUTPATIENT)
Dept: FAMILY MEDICINE | Facility: OTHER | Age: 43
End: 2018-03-02
Attending: NURSE PRACTITIONER
Payer: COMMERCIAL

## 2018-03-02 VITALS
TEMPERATURE: 98.1 F | SYSTOLIC BLOOD PRESSURE: 124 MMHG | WEIGHT: 150.19 LBS | DIASTOLIC BLOOD PRESSURE: 70 MMHG | BODY MASS INDEX: 27.25 KG/M2 | HEART RATE: 64 BPM

## 2018-03-02 DIAGNOSIS — J01.00 ACUTE NON-RECURRENT MAXILLARY SINUSITIS: Primary | ICD-10-CM

## 2018-03-02 DIAGNOSIS — J01.10 ACUTE NON-RECURRENT FRONTAL SINUSITIS: ICD-10-CM

## 2018-03-02 PROCEDURE — 99213 OFFICE O/P EST LOW 20 MIN: CPT | Performed by: NURSE PRACTITIONER

## 2018-03-02 RX ORDER — AZITHROMYCIN 250 MG/1
TABLET, FILM COATED ORAL
Qty: 6 TABLET | Refills: 0 | Status: SHIPPED | OUTPATIENT
Start: 2018-03-02 | End: 2018-06-15

## 2018-03-02 NOTE — PATIENT INSTRUCTIONS
Azithromycin daily x 5 days    Humidifier or vaporizier at bedtime    Saline nasal spray during day    Follow up if worsening or concerns

## 2018-03-02 NOTE — PROGRESS NOTES
HPI:    Mallorie Machado is a 42 year old female  who presents to clinic today for sinus.   Symptoms include headaches, sinus pain, nasal drainage, mouth pain, jaw pain, post nasal drainage, and fatigue.   Symptoms worsening for the past week.  Started with thick drainage, lots of pressure in face - nostrils feel swollen and drainage is only trickling out.  States foul odor in nose.  Bloody drainage from nose.  Throat irritated from post nasal drainage.  No chest congestion.  No cough.  States she frequently gets sinus infections after a cold.  No fevers.  Alternating chills and sweats.  Appetite decreased some.  Nauseated yesterday from post nasal drainage.  No vomiting.  Pushing water intake.  Mild ear pain.  Dizziness and light headedness last night.    Reports trying Ibuprofen and allergy med without relief.  Taking Excedrin migraine.  States the Z aron is the only abx that works for her sinus infection.            Past Medical History:   Diagnosis Date     Allergic rhinitis due to pollen     No Comments Provided     Cyst of ovary     1994     Dermatitis     scalp     Dysmenorrhea     No Comments Provided     Dysthymic disorder     No Comments Provided     Endometriosis     No Comments Provided     Hypothyroidism     No Comments Provided     Injury of lower back     02/07,Cervical disc injury, C5-6, with improvement (work comp related)     Low back pain     No Comments Provided     Obesity     No Comments Provided     Other cervical disc degeneration, unspecified cervical region     No Comments Provided     Other cervical disc displacement, unspecified cervical region     No Comments Provided     Personal history of other diseases of the female genital tract     G2, P2-0-0-2, vaginal deliveries     Uncomplicated asthma     No Comments Provided     Past Surgical History:   Procedure Laterality Date     ATTEMPTED ARTHROSCOPY      1/20/06,left wrist arthroscopy with debridement - Dr. Mahmood, [facility]     FUSION  CERVICAL ANTERIOR ONE LEVEL      2008,C5-6     HYSTERECTOMY TOTAL ABDOMINAL, BILATERAL SALPINGO-OOPHORECTOMY, COMBINED      11/04,bilateral salpingo-oophorectomy     LAPAROSCOPY DIAGNOSTIC (GENERAL)      6/04,02/07     OTHER SURGICAL HISTORY       x 2,[id number],VAGINAL DELIVERY     OTHER SURGICAL HISTORY      1/19/2012,205448,REMOVAL OF FOREIGN BODY,ACDF C4-5 and C6-7 with hardware removal [Other][[[     Social History   Substance Use Topics     Smoking status: Current Every Day Smoker     Types: Cigarettes     Smokeless tobacco: Never Used     Alcohol use No     Current Outpatient Prescriptions   Medication Sig Dispense Refill     albuterol (PROAIR HFA) 108 (90 BASE) MCG/ACT Inhaler Inhale 2 puffs into the lungs 4 times daily as needed INHALE 2 PUFFS AS NEEDED FOR ASTHMA UP TO 4 TIMES DAILY       meloxicam (MOBIC) 15 MG tablet Take 15 mg by mouth daily Take 1 tablet by mouth once daily.       levothyroxine (SYNTHROID/LEVOTHROID) 100 MCG tablet Take 100 mcg by mouth every morning (before breakfast) Take 1 tablet by mouth before breakfast       Allergies   Allergen Reactions     Hydrocodone-Acetaminophen Itching and Nausea     Meperidine Itching     No hives     Morphine      Other reaction(s): Erythema  Face & hands     Oxycodone-Acetaminophen Itching     Propoxyphene N-Apap      Other reaction(s): Throat Swelling/Closing         Past medical history, past surgical history, current medications and allergies reviewed and accurate to the best of my knowledge.        ROS:  Refer to HPI    /70 (BP Location: Left arm, Patient Position: Sitting, Cuff Size: Adult Regular)  Pulse 64  Temp 98.1  F (36.7  C) (Tympanic)  Wt 150 lb 3 oz (68.1 kg)  BMI 27.25 kg/m2    EXAM:  General Appearance: Well appearing adult female, appropriate appearance for age. No acute distress  Head: normocephalic, atraumatic  Ears: Left TM grey, translucent with bony landmarks appreciated, no erythema, serous effusion with mild bulging, no  retraction, no purulence.  Right TM grey, translucent with bony landmarks appreciated, no erythema, serous effusion with mild bulging, no retraction, no purulence.  Left auditory canal clear.  Right auditory canal clear.  Normal external ears, non tender.  Eyes: conjunctivae normal without erythema or irritation, no drainage or crusting, no eyelid swelling, pupils equal   Orophayrnx: moist mucous membranes, posterior pharynx without erythema, tonsils without hypertrophy, no erythema, no exudates or petechiae, no post nasal drip seen.    Sinuses:  Generalized sinus tenderness upon palpation of the frontal, maxillary, and ethmoid sinuses  Nose:  Bilateral nares: erythema and diffuse edema, no active drainage or congestion   Neck: supple without adenopathy  Respiratory: normal chest wall and respirations.  Normal effort.  Clear to auscultation bilaterally, no wheezing, crackles or rhonchi.  No increased work of breathing.  No cough appreciated.  Cardiac: RRR with no murmurs  Musculoskeletal:  Normal gait.  Equal movement of bilateral upper extremities.  Equal movement of bilateral lower extremities.    Dermatological: no rashes noted of exposed skin  Psychological: normal affect, alert and pleasant          ASSESSMENT/PLAN:    ICD-10-CM    1. Acute non-recurrent maxillary sinusitis J01.00 azithromycin (ZITHROMAX) 250 MG tablet   2. Acute non-recurrent frontal sinusitis J01.10 azithromycin (ZITHROMAX) 250 MG tablet         Azithromycin daily x 5 days (z aron dosing).  Patient states this is the only abx that works for her sinus infections.      Symptomatic treatment - Encouraged fluids, salt water gargles, honey, elevation, humidifier, sinus rinse/netti pot, saline nasal spray, steamy shower, lozenges, etc     Tylenol or ibuprofen or Excedrin PRN    Follow up if symptoms persist or worsen or concerns

## 2018-03-02 NOTE — MR AVS SNAPSHOT
"              After Visit Summary   3/2/2018    Mallorie Machado    MRN: [id number]           Patient Information     Date Of Birth          1975        Visit Information        Provider Department      3/2/2018 10:45 AM Hazel Cedeño NP [facility]        Today's Diagnoses     Acute non-recurrent maxillary sinusitis    -  1    Acute non-recurrent frontal sinusitis          Care Instructions    Azithromycin daily x 5 days    Humidifier or vaporizier at bedtime    Saline nasal spray during day    Follow up if worsening or concerns          Follow-ups after your visit        Who to contact     If you have questions or need follow up information about today's clinic visit or your schedule please contact [facility] directly at [phone].  Normal or non-critical lab and imaging results will be communicated to you by Farmstrhart, letter or phone within 4 business days after the clinic has received the results. If you do not hear from us within 7 days, please contact the clinic through Farmstrhart or phone. If you have a critical or abnormal lab result, we will notify you by phone as soon as possible.  Submit refill requests through IntenseDebate or call your pharmacy and they will forward the refill request to us. Please allow 3 business days for your refill to be completed.          Additional Information About Your Visit        Farmstrhart Information     IntenseDebate lets you send messages to your doctor, view your test results, renew your prescriptions, schedule appointments and more. To sign up, go to [email or web address] . Click on \"Log in\" on the left side of the screen, which will take you to the Welcome page. Then click on \"Sign up Now\" on the right side of the page.     You will be asked to enter the access code listed below, as well as some personal information. Please follow the directions to create your username and password.     Your access code is: [email or web address]  Expires: " 2018 11:28 AM     Your access code will  in 90 days. If you need help or a new code, please call your [place] clinic or [phone].        Care EveryWhere ID     This is your Care EveryWhere ID. This could be used by other organizations to access your [place] medical records  [id number]        Your Vitals Were     Pulse Temperature BMI (Body Mass Index)             64 98.1  F (36.7  C) (Tympanic) 27.25 kg/m2          Blood Pressure from Last 3 Encounters:   18 124/70   17 114/74   17 108/60    Weight from Last 3 Encounters:   18 150 lb 3 oz (68.1 kg)   17 153 lb 12.8 oz (69.8 kg)   17 154 lb (69.9 kg)              Today, you had the following     No orders found for display         Today's Medication Changes          These changes are accurate as of 3/2/18 11:28 AM.  If you have any questions, ask your nurse or doctor.               Start taking these medicines.        Dose/Directions    azithromycin 250 MG tablet   Commonly known as:  ZITHROMAX   Used for:  Acute non-recurrent frontal sinusitis, Acute non-recurrent maxillary sinusitis   Started by:  Hazel Cedeño NP        Two tablets first day, then one tablet daily for four days.   Quantity:  6 tablet   Refills:  0            Where to get your medicines      [id number] [place], [place] - [address]  [address], [facility] [address]     Phone:  [phone]     azithromycin 250 MG tablet                Primary Care Provider Office Phone # Fax #    Jerardo Navarrete [phone]       [address]  [facility] [address]        Equal Access to Services     SVETLANA RIVERS AH: Eloina Cummings, anabel eden, ketan kaalbambi preston. So [facility] [phone].    ATENCIÓN: Si habla español, tiene a crocker disposición servicios gratuitos de asistencia  lingüísticaTommie Tabor al [phone].    We comply with applicable federal civil rights laws and [place] laws. We do not discriminate on the basis of race, color, national origin, age, disability, sex, sexual orientation, or gender identity.            Thank you!     Thank you for choosing [facility] AND [facility]  for your care. Our goal is always to provide you with excellent care. Hearing back from our patients is one way we can continue to improve our services. Please take a few minutes to complete the written survey that you may receive in the mail after your visit with us. Thank you!             Your Updated Medication List - Protect others around you: Learn how to safely use, store and throw away your medicines at www.disposemymeds.org.          This list is accurate as of 3/2/18 11:28 AM.  Always use your most recent med list.                   Brand Name Dispense Instructions for use Diagnosis    azithromycin 250 MG tablet    ZITHROMAX    6 tablet    Two tablets first day, then one tablet daily for four days.    Acute non-recurrent frontal sinusitis, Acute non-recurrent maxillary sinusitis       levothyroxine 100 MCG tablet    SYNTHROID/LEVOTHROID     Take 100 mcg by mouth every morning (before breakfast) Take 1 tablet by mouth before breakfast        meloxicam 15 MG tablet    MOBIC     Take 15 mg by mouth daily Take 1 tablet by mouth once daily.        PROAIR  (90 BASE) MCG/ACT Inhaler   Generic drug:  albuterol      Inhale 2 puffs into the lungs 4 times daily as needed INHALE 2 PUFFS AS NEEDED FOR ASTHMA UP TO 4 TIMES DAILY

## 2018-03-02 NOTE — NURSING NOTE
Patient presents to the clinic for sinus pain, sinus drainage, fatigue and mouth pain x 1 week. Patient has concerns regarding a sinus infection. She has tried ibuprofen and allergy medication with no relief.  Caroline BIGGS CMA.......3/2/2018..11:10 AM

## 2018-05-24 DIAGNOSIS — E03.9 HYPOTHYROIDISM, UNSPECIFIED TYPE: Primary | ICD-10-CM

## 2018-05-30 RX ORDER — LEVOTHYROXINE SODIUM 100 UG/1
TABLET ORAL
Qty: 30 TABLET | Refills: 0 | Status: SHIPPED | OUTPATIENT
Start: 2018-05-30 | End: 2018-05-30

## 2018-05-30 RX ORDER — LEVOTHYROXINE SODIUM 100 UG/1
100 TABLET ORAL DAILY
Qty: 30 TABLET | Refills: 0 | Status: SHIPPED | OUTPATIENT
Start: 2018-05-30 | End: 2018-06-15

## 2018-05-30 NOTE — TELEPHONE ENCOUNTER
"Requested Prescriptions   Pending Prescriptions Disp Refills     levothyroxine (SYNTHROID/LEVOTHROID) 100 MCG tablet [Pharmacy Med Name: LEVOTHYROXINE 0.100MG (100MCG) TAB] 90 tablet 0     Sig: TAKE 1 TABLET BY MOUTH BEFORE BREAKFAST    Thyroid Protocol Failed    5/24/2018  7:38 AM       Failed - Normal TSH on file in past 12 months    No lab results found.          Passed - Patient is 12 years or older       Passed - Recent (12 mo) or future (30 days) visit within the authorizing provider's specialty    Patient had office visit in the last 12 months or has a visit in the next 30 days with authorizing provider or within the authorizing provider's specialty.  See \"Patient Info\" tab in inbasket, or \"Choose Columns\" in Meds & Orders section of the refill encounter.           Passed - No active pregnancy on record    If patient is pregnant or has had a positive pregnancy test, please check TSH.         Passed - No positive pregnancy test in past 12 months    If patient is pregnant or has had a positive pregnancy test, please check TSH.          Last PMD visit and labs were 5/3/17 and pt was advised to have TSH recehecked in 8 weeks since she had not taken her thyroid medication in over a month.   None found in system RN unable to refill does not meet protocol.    Pharmacy: [facility]  Medication: Synthroid 100 mcg 1 tab QD  # 90 Refills # 0  Last filled : 4/19/18  Mabel Almazan, RN on 5/30/2018 at 9:03 AM   "

## 2018-05-30 NOTE — TELEPHONE ENCOUNTER
"Refill request from [facility] for   levothyroxine (SYNTHROID/LEVOTHROID) 100 MCG tablet      LOV 8/3/2017 with Jerardo Navarrete MD    Note from OV 5/3/2017  Patient had been off of medication for over 1 month and was advised to return in 8 weeks for a recheck TSH as it was grossly elevated (94.4).      Contacted patient and relayed to them that they are overdue for a recheck exam.  Patient was appreciative.  Transferred to scheduling.  Appointment noted for 6/15/2018 with Jerardo Navarrete MD    Limited refill given    Requested Prescriptions   Pending Prescriptions Disp Refills     levothyroxine (SYNTHROID/LEVOTHROID) 100 MCG tablet [Pharmacy Med Name: LEVOTHYROXINE 0.100MG (100MCG) TAB] 90 tablet 0     Sig: TAKE 1 TABLET BY MOUTH BEFORE BREAKFAST    Thyroid Protocol Failed    5/30/2018  9:08 AM       Failed - Normal TSH on file in past 12 months    No lab results found.          Passed - Patient is 12 years or older       Passed - Recent (12 mo) or future (30 days) visit within the authorizing provider's specialty    Patient had office visit in the last 12 months or has a visit in the next 30 days with authorizing provider or within the authorizing provider's specialty.  See \"Patient Info\" tab in inbasket, or \"Choose Columns\" in Meds & Orders section of the refill encounter.           Passed - No active pregnancy on record    If patient is pregnant or has had a positive pregnancy test, please check TSH.         Passed - No positive pregnancy test in past 12 months    If patient is pregnant or has had a positive pregnancy test, please check TSH.          Syeda Barragan RN  ....................  5/30/2018   9:39 AM      "

## 2018-06-15 ENCOUNTER — TELEPHONE (OUTPATIENT)
Dept: FAMILY MEDICINE | Facility: OTHER | Age: 43
End: 2018-06-15

## 2018-06-15 ENCOUNTER — OFFICE VISIT (OUTPATIENT)
Dept: FAMILY MEDICINE | Facility: OTHER | Age: 43
End: 2018-06-15
Attending: FAMILY MEDICINE
Payer: COMMERCIAL

## 2018-06-15 VITALS
HEART RATE: 60 BPM | HEIGHT: 63 IN | SYSTOLIC BLOOD PRESSURE: 118 MMHG | BODY MASS INDEX: 27.04 KG/M2 | WEIGHT: 152.6 LBS | DIASTOLIC BLOOD PRESSURE: 70 MMHG

## 2018-06-15 DIAGNOSIS — J45.20 MILD INTERMITTENT ASTHMA, UNSPECIFIED WHETHER COMPLICATED: Primary | ICD-10-CM

## 2018-06-15 DIAGNOSIS — J30.1 CHRONIC SEASONAL ALLERGIC RHINITIS DUE TO POLLEN: ICD-10-CM

## 2018-06-15 DIAGNOSIS — E03.9 HYPOTHYROIDISM, UNSPECIFIED TYPE: ICD-10-CM

## 2018-06-15 LAB — TSH SERPL DL<=0.05 MIU/L-ACNC: 5.33 IU/ML (ref 0.34–5.6)

## 2018-06-15 PROCEDURE — 84443 ASSAY THYROID STIM HORMONE: CPT | Performed by: FAMILY MEDICINE

## 2018-06-15 PROCEDURE — 99214 OFFICE O/P EST MOD 30 MIN: CPT | Performed by: FAMILY MEDICINE

## 2018-06-15 PROCEDURE — 36415 COLL VENOUS BLD VENIPUNCTURE: CPT | Performed by: FAMILY MEDICINE

## 2018-06-15 RX ORDER — ALBUTEROL SULFATE 90 UG/1
2 AEROSOL, METERED RESPIRATORY (INHALATION) 4 TIMES DAILY PRN
Qty: 1 INHALER | Refills: 3 | Status: SHIPPED | OUTPATIENT
Start: 2018-06-15 | End: 2021-07-13

## 2018-06-15 RX ORDER — LEVOTHYROXINE SODIUM 100 UG/1
100 TABLET ORAL DAILY
Qty: 90 TABLET | Refills: 3 | Status: SHIPPED | OUTPATIENT
Start: 2018-06-15 | End: 2019-07-29

## 2018-06-15 ASSESSMENT — ENCOUNTER SYMPTOMS
SHORTNESS OF BREATH: 0
TROUBLE SWALLOWING: 0
NAUSEA: 0
VOMITING: 0
VOICE CHANGE: 0
ACTIVITY CHANGE: 0
MYALGIAS: 1
CONSTIPATION: 0
RHINORRHEA: 1
WOUND: 0
COUGH: 1
COLOR CHANGE: 1
WEAKNESS: 0
SINUS PRESSURE: 0
SORE THROAT: 0
ABDOMINAL PAIN: 0
SINUS PAIN: 0
FEVER: 0
PALPITATIONS: 0
DIARRHEA: 0
APPETITE CHANGE: 0
HEADACHES: 0
FATIGUE: 0

## 2018-06-15 ASSESSMENT — PAIN SCALES - GENERAL: PAINLEVEL: MODERATE PAIN (4)

## 2018-06-15 NOTE — NURSING NOTE
Patient presents in the clinic for a physical and medication renewal, she does state that she fell down her deck on Wednesday and hit her right hip and arm.   Grace Beck LPN 6/15/2018 8:14 AM

## 2018-06-15 NOTE — PROGRESS NOTES
SUBJECTIVE:   Mallorie Machado is a 42 year old female who presents to clinic today for the following health issues: med refill    HPI  Mallorie Machado is a pleasant 42 year old female with hypothyroidism who presents today for thyroid follow up. She has been doing well, adequate energy and normal BMs. A lot of allergies right now causing some post nasal drip leading to a cough. She uses claritin daily. She also fell off the back of her deck on Wednesday which caused a bruise on her right hip but the pain is well controlled and she does not think she broke anything.     Has episodes of crampy chest pain that occurs under her left breast and last for a few seconds and then disappears without intervention. It occurs usually at rest. She feels it is more of a muscle cramp sensation than real chest pain. No SOB.     Patient Active Problem List    Diagnosis Date Noted     Allergic rhinitis due to pollen 01/30/2018     Priority: Medium     Dysthymic disorder 01/30/2018     Priority: Medium     Lumbago 01/30/2018     Priority: Medium     Overview:   mechanical       Contact dermatitis and eczema 01/30/2018     Priority: Medium     Overview:   recurrent       Dysmenorrhea 01/30/2018     Priority: Medium     Overview:   pelvic pain       Hypothyroidism 01/30/2018     Priority: Medium     Obesity 01/30/2018     Priority: Medium     Overview:   Current BMI 29; abdominal girth greater than 35 inches 05/2007       Pain in joint, forearm 01/30/2018     Priority: Medium     Overview:   chronic       Impaired fasting glucose 09/13/2013     Priority: Medium     Trigeminal neuritis 04/20/2013     Priority: Medium     Herniated cervical disc 01/13/2012     Priority: Medium     Asthma 09/14/2011     Priority: Medium     Tick bite 05/23/2011     Priority: Medium     Otitis media, right 12/20/2010     Priority: Medium     Endometriosis 10/29/2010     Priority: Medium     Degeneration of cervical intervertebral disc 07/27/2010      Priority: Medium     Past Surgical History:   Procedure Laterality Date     ATTEMPTED ARTHROSCOPY      1/20/06,left wrist arthroscopy with debridement - Dr. Mahmood, [place]     FUSION CERVICAL ANTERIOR ONE LEVEL      2008,C5-6     HYSTERECTOMY TOTAL ABDOMINAL, BILATERAL SALPINGO-OOPHORECTOMY, COMBINED      11/04,bilateral salpingo-oophorectomy     LAPAROSCOPY DIAGNOSTIC (GENERAL)      6/04,02/07     OTHER SURGICAL HISTORY       x 2,[id number],VAGINAL DELIVERY     OTHER SURGICAL HISTORY      1/19/2012,205448,REMOVAL OF FOREIGN BODY,ACDF C4-5 and C6-7 with hardware removal [Other][[[     Social History   Substance Use Topics     Smoking status: Current Every Day Smoker     Packs/day: 0.50     Types: Cigarettes     Smokeless tobacco: Never Used     Alcohol use No     Current Outpatient Prescriptions   Medication Sig Dispense Refill     albuterol (PROAIR HFA) 108 (90 BASE) MCG/ACT Inhaler Inhale 2 puffs into the lungs 4 times daily as needed INHALE 2 PUFFS AS NEEDED FOR ASTHMA UP TO 4 TIMES DAILY       levothyroxine (SYNTHROID/LEVOTHROID) 100 MCG tablet Take 1 tablet (100 mcg) by mouth daily 30 tablet 0     Allergies   Allergen Reactions     Hydrocodone-Acetaminophen Itching and Nausea     Meperidine Itching     No hives     Morphine      Other reaction(s): Erythema  Face & hands     Oxycodone-Acetaminophen Itching     Propoxyphene N-Apap      Other reaction(s): Throat Swelling/Closing       Review of Systems   Constitutional: Negative for activity change, appetite change, fatigue and fever.   HENT: Positive for postnasal drip and rhinorrhea. Negative for sinus pain, sinus pressure, sore throat, trouble swallowing and voice change.    Respiratory: Positive for cough. Negative for shortness of breath.    Cardiovascular: Positive for chest pain. Negative for palpitations and peripheral edema.   Gastrointestinal: Negative for abdominal pain, constipation, diarrhea, nausea and vomiting.   Musculoskeletal: Positive for  "myalgias.   Skin: Positive for color change. Negative for rash and wound.   Neurological: Negative for weakness and headaches.      OBJECTIVE:     /70 (BP Location: Right arm, Patient Position: Sitting, Cuff Size: Adult Regular)  Pulse 60  Ht 5' 2.5\" (1.588 m)  Wt 152 lb 9.6 oz (69.2 kg)  BMI 27.47 kg/m2  Body mass index is 27.47 kg/(m^2).  Physical Exam   Constitutional: She is oriented to person, place, and time. She appears well-developed and well-nourished. No distress.   HENT:   Head: Normocephalic and atraumatic.   Right Ear: External ear normal.   Left Ear: External ear normal.   Mouth/Throat: Oropharynx is clear and moist. No oropharyngeal exudate.   Cobblestoning present    Eyes: Right eye exhibits no discharge. Left eye exhibits no discharge.   Neck: No thyromegaly present.   Cardiovascular: Normal rate, regular rhythm and normal heart sounds.  Exam reveals no gallop and no friction rub.    No murmur heard.  Pulmonary/Chest: Effort normal and breath sounds normal. She has no wheezes.   Abdominal: Soft. There is no tenderness. There is no rebound and no guarding.   Musculoskeletal: Normal range of motion. She exhibits no edema or deformity.   Lymphadenopathy:     She has no cervical adenopathy.   Neurological: She is alert and oriented to person, place, and time.   Skin: She is not diaphoretic.   Ecchymosis over the right lateral hip   Psychiatric: She has a normal mood and affect. Her behavior is normal.       Diagnostic Test Results:    ASSESSMENT/PLAN:           (E03.9) Hypothyroidism, unspecified type  Comment: Chronic  Plan: levothyroxine (SYNTHROID/LEVOTHROID) 100 MCG         tablet, TSH        Will recheck her TSH today and refill her medication. Clinically, she is doing well.     (J30.1) Chronic seasonal allergic rhinitis due to pollen  Comment: Chronic  Plan: Discussed the use of OTC nasal sprays for better relief along with the daily claritin.     (J45.20) Mild intermittent asthma, " unspecified whether complicated  (primary encounter diagnosis)  Comment:  stable  Plan: albuterol (PROAIR HFA) 108 (90 Base) MCG/ACT         Inhaler        Only uses her inhaler a few times a year    Forwarded to Dr. Navarrete for review.  Xiomara Ortega on 6/15/2018 at 8:40 AM    Results for orders placed or performed in visit on 06/15/18   TSH   Result Value Ref Range    Thyrotropin 5.33 0.34 - 5.60 IU/mL     Pt was seen and examined by me as well as Xiomara Ortega, MS 3.  See her note for details.    Jerardo Navarrete MD on 6/18/2018 at 7:28 AM

## 2018-06-15 NOTE — MR AVS SNAPSHOT
"              After Visit Summary   6/15/2018    Mallorie Machado    MRN: [id number]           Patient Information     Date Of Birth          1975        Visit Information        Provider Department      6/15/2018 8:15 AM Jerardo Navarrete MD [facility]        Today's Diagnoses     Mild intermittent asthma, unspecified whether complicated    -  1    Hypothyroidism, unspecified type        Chronic seasonal allergic rhinitis due to pollen           Follow-ups after your visit        Who to contact     If you have questions or need follow up information about today's clinic visit or your schedule please contact [facility] [place] [facility]\"" directly at [phone].  Normal or non-critical lab and imaging results will be communicated to you by true[x] Mediahart, letter or phone within 4 business days after the clinic has received the results. If you do not hear from us within 7 days, please contact the clinic through true[x] Mediahart or phone. If you have a critical or abnormal lab result, we will notify you by phone as soon as possible.  Submit refill requests through [employer] or call your pharmacy and they will forward the refill request to us. Please allow 3 business days for your refill to be completed.          Additional Information About Your Visit        MyChart Information     [employer] lets you send messages to your doctor, view your test results, renew your prescriptions, schedule appointments and more. To sign up, go to [email or web address] . Click on \"Log in\" on the left side of the screen, which will take you to the Welcome page. Then click on \"Sign up Now\" on the right side of the page.     You will be asked to enter the access code listed below, as well as some personal information. Please follow the directions to create your username and password.     Your access code is: [email or web address]  Expires: 2018  7:28 AM     Your access code will  in 90 days. If you need help or a new code, please " "call your [facility] clinic or [phone].        Care EveryWhere ID     This is your Care EveryWhere ID. This could be used by other organizations to access your [facility] medical records  [id number]        Your Vitals Were     Pulse Height BMI (Body Mass Index)             60 5' 2.5\" (1.588 m) 27.47 kg/m2          Blood Pressure from Last 3 Encounters:   06/15/18 118/70   03/02/18 124/70   08/03/17 114/74    Weight from Last 3 Encounters:   06/15/18 152 lb 9.6 oz (69.2 kg)   03/02/18 150 lb 3 oz (68.1 kg)   08/03/17 153 lb 12.8 oz (69.8 kg)              We Performed the Following     TSH          Where to get your medicines      These medications were sent to [id number] - [place], [place] - [address]  [address], [facility] [address]     Phone:  [phone]     albuterol 108 (90 Base) MCG/ACT Inhaler    levothyroxine 100 MCG tablet          Primary Care Provider Office Phone # Fax #    Jerardo Navarrete [phone]       [address] [facility] [address]        Equal Access to Services     SVETLANA RIVERS : Hadii lorraine angel hadasho Soomaali, waaxda luqadaha, qaybta kaalmada adeegyada, bambi goldstein. So [facility] [phone].    ATENCIÓN: Si habla español, tiene a crocker disposición servicios gratuitos de asistencia lingüística. Llame al [phone].    We comply with applicable federal civil rights laws and [place] laws. We do not discriminate on the basis of race, color, national origin, age, disability, sex, sexual orientation, or gender identity.            Thank you!     Thank you for choosing [facility]  for your care. Our goal is always to provide you with excellent care. Hearing back from our patients is one way we can continue to improve our services. Please take a few minutes to complete the written survey that you may receive in the mail after your visit with us. Thank you!           "   Your Updated Medication List - Protect others around you: Learn how to safely use, store and throw away your medicines at www.disposemymeds.org.          This list is accurate as of 6/15/18 11:59 PM.  Always use your most recent med list.                   Brand Name Dispense Instructions for use Diagnosis    albuterol 108 (90 Base) MCG/ACT Inhaler    PROAIR HFA    1 Inhaler    Inhale 2 puffs into the lungs 4 times daily as needed INHALE 2 PUFFS AS NEEDED FOR ASTHMA UP TO 4 TIMES DAILY    Mild intermittent asthma, unspecified whether complicated       levothyroxine 100 MCG tablet    SYNTHROID/LEVOTHROID    90 tablet    Take 1 tablet (100 mcg) by mouth daily    Hypothyroidism, unspecified type

## 2018-06-15 NOTE — TELEPHONE ENCOUNTER
Patient returned call for lab results. Patient notified that TSH was normal.  Jesusita Arredondo LPN...................6/15/2018  2:48 PM

## 2018-06-15 NOTE — LETTER
Nancy 15, 2018      Mallorie Machado  [address] [facility] [address]        Dear Mallorie,     The lab looks normal.  Keep taking the med.    Results for orders placed or performed in visit on 06/15/18   TSH   Result Value Ref Range    Thyrotropin 5.33 0.34 - 5.60 IU/mL           Sincerely,        Jerardo Navarrete MD

## 2018-06-16 ASSESSMENT — ASTHMA QUESTIONNAIRES: ACT_TOTALSCORE: 21

## 2018-07-23 NOTE — PROGRESS NOTES
"Patient Information     Patient Name  Mallorie Machado MRN  [id number] Sex  Female   1975      Letter by Jerardo Navarrete MD at      Author:  Jerardo Navarrete MD Service:  (none) Author Type:  (none)    Filed:   Encounter Date:  2017 Status:  (Other)       Remember to activate your account quickly -   Your activation code expires in 45 days!    May 16, 2017    Mallorie Machado  [address] [facility] [address]    Dear Ms. Machado,    When you activate your [facility] account online you ll have more ways to communicate with your clinic, hospital and care team; get answers faster and be able to keep your health information organized and at your fingertips.    To activate your account, you will need:     to visit [employer]    your activation code:  [email or web address]    Expires: 2017  1:37 PM    the last four digits of your Social Security number (SSN)    your date of birth.      Step-by-step instructions on how to set up your account are shown on the following page.  If you requested access to a child s or another adult s interactive health record, instructions for viewing their information are also on the following page.     For questions or technical assistance, call [phone].      In [employer]                                                Account activation instructions    Activation code: [email or web address]  Expires: 2017  1:37 PM    Step 1:   Go to [employer]. Hover over My Account in the top navigation and click on Create an Account from the drop down menu.    Step 2:   Click on I have an activation code  .  Step 3:   Type in your activation code (provided above), the last four digits of your Social Security number (SSN) and date of birth. This information is only required once.  The next time you sign in to your account you will only need your username and password. If you receive an error that states \"We were unable to find a match,\" that means the " information we have on file does not match the information entered. After three failed attempts, please call [phone] for assistance.    Step 4:   Enter your email address, if you already have a [facility] account. Or click I don t have this account to bypass this step.    Step 5: Review and confirm the information on the screen that you would like to use for your account. Be sure to use a valid email address; you will be sent a confirmation email to confirm your account.    Step 6: Choose a username that is easy for you to remember, but hard for others to guess.  Your username must:    be between five and 24 characters    contain only letters and numbers (no symbols).      Step 7:   Choose a password. Your password must:    be at least eight characters    contain at least one uppercase letter    contain one lowercase letter    contain one number or symbol    be different than your username.    Step 8:  Review your account information and accept the terms and conditions.    Step 9:  Check your email for a message from [employer] to confirm your account within 24 hours.    Step 10:  After confirming your account, you can now sign in on [email or web address] by selecting My Account.     Health record proxy access    If you requested proxy access for a child s or another adult s interactive health record, you will be able to access their health record once you sign in and select a task (test results, messages, appointments, etc.) from the home page navigation.

## 2018-07-23 NOTE — PROGRESS NOTES
Patient Information     Patient Name  Mallorie Machado MRN  [id number] Sex  Female   1975      Letter by Jerardo Navarrete MD at      Author:  Jerardo Navarrete MD Service:  (none) Author Type:  (none)    Filed:   Encounter Date:  5/3/2017 Status:  (Other)           Mallorie Machado  [address] [facility] [address]          May 5, 2017    Dear Ms. Machado:    The result from the Pap test(s) you had done at your recent clinic visit came back as normal.     We recommend that you have an adult physical exam each year. Depending on your Pap test history, you may or may not need a Pap test at these visits.  You and your health care provider will decide what is right for you.    If you have any further questions or concerns, please call  [phone]. You may also contact us by using medical messaging if you have MyChart.    Thank you for choosing [facility] to participate in your healthcare needs.    Sincerely,    Jerardo Navarrete MD          The [employer] Screening Program is a statewide comprehensive breast and cervical cancer screening program that provides free screening and follow-up services (including colposcopy) to uninsured and underinsured women. For more information call toll free [phone] ([phone])

## 2018-07-24 NOTE — PROGRESS NOTES
Patient Information     Patient Name  Mallorie Machado MRN  [id number] Sex  Female   1975      Letter by Jerardo Navarrete MD at      Author:  Jerardo Navarrete MD Service:  (none) Author Type:  (none)    Filed:   Encounter Date:  5/3/2017 Status:  (Other)           Mallorie Machado  [address] [facility] [address]          May 3, 2017    Dear Ms. Machado:    Following are the tests completed during your last clinic visit.      Results for orders placed or performed in visit on 17      TSH      Result  Value Ref Range    TSH 94.40 (H) 0.34 - 5.60 uIU/mL   GLUCOSE, FASTING      Result  Value Ref Range    GLUCOSE 96 70 - 105 mg/dL         If you have any further questions or problems contact my office at  [phone].    Thank you,    Jerardo Navarrete MD

## 2019-07-29 DIAGNOSIS — E03.9 HYPOTHYROIDISM, UNSPECIFIED TYPE: Primary | ICD-10-CM

## 2019-07-29 RX ORDER — LEVOTHYROXINE SODIUM 100 UG/1
100 TABLET ORAL DAILY
Qty: 90 TABLET | Refills: 0 | Status: SHIPPED | OUTPATIENT
Start: 2019-07-29 | End: 2019-08-13

## 2019-07-29 NOTE — TELEPHONE ENCOUNTER
Chrissie SINGH sent Rx request for the following:    Patient OUT OF MEDICATION.     levothyroxine (SYNTHROID/LEVOTHROID) 100 MCG tablet  Sig: Take 1 tablet (100 mcg) by mouth daily  Last Prescription Date:   6/15/18  Last Fill Qty/Refills:         90, R-3    Last Office Visit:              6/15/18  Future Office visit:           None.    Thyroid Protocol Failed7/29 2:04 PM   Recent (12 mo) or future (30 days) visit within the authorizing provider's specialty    Normal TSH on file in past 12 months     Patient due for annual exam and labs. Reminder letter sent. Prescription approved per [facility] Refill Protocol for 90-day damien refill. Zaida Mercado RN .............. 7/29/2019  2:06 PM

## 2019-07-29 NOTE — LETTER
July 29, 2019      Mallorie Machdao  [address] [address] [facility] [address]        Dear Mallorie,     This letter is to remind you that you are due for your annual exam with Jerardo Navarrete. Your last comprehensive visit was more than 12 months ago.    A LIMITED refill of Levothyroxine (SYNTHROID/LEVOTHROID) 100 MCG tablet has been called into your pharmacy. Additional refills require you to complete this appointment.    Please call the clinic at [phone] to schedule your appointment.    If you should require additional refills before your scheduled appointment, please contact your pharmacy and we will refill your medication until the date of your appointment.    If you are no longer seeing Jerardo Navarrete for primary care, please call to let us know. Doing so will remove you from our call/contact list.      Thank you for choosing [facility] and [facility] for your health care needs.    Sincerely,    Refill RN  [facility]

## 2019-07-29 NOTE — TELEPHONE ENCOUNTER
No prior requests noted.    Refill request initiated and routed to [employer], high priority, to run through refill protocol. Zaida Mercado RN .............. 7/29/2019  2:04 PM

## 2019-08-13 ENCOUNTER — OFFICE VISIT (OUTPATIENT)
Dept: FAMILY MEDICINE | Facility: OTHER | Age: 44
End: 2019-08-13
Attending: FAMILY MEDICINE
Payer: COMMERCIAL

## 2019-08-13 VITALS
DIASTOLIC BLOOD PRESSURE: 60 MMHG | RESPIRATION RATE: 14 BRPM | TEMPERATURE: 98.6 F | WEIGHT: 160 LBS | BODY MASS INDEX: 28.35 KG/M2 | HEART RATE: 67 BPM | SYSTOLIC BLOOD PRESSURE: 118 MMHG | OXYGEN SATURATION: 96 % | HEIGHT: 63 IN

## 2019-08-13 DIAGNOSIS — R73.01 IMPAIRED FASTING GLUCOSE: ICD-10-CM

## 2019-08-13 DIAGNOSIS — Z72.0 TOBACCO ABUSE: ICD-10-CM

## 2019-08-13 DIAGNOSIS — E03.9 HYPOTHYROIDISM, UNSPECIFIED TYPE: ICD-10-CM

## 2019-08-13 DIAGNOSIS — Z00.00 ROUTINE GENERAL MEDICAL EXAMINATION AT A HEALTH CARE FACILITY: Primary | ICD-10-CM

## 2019-08-13 PROBLEM — F34.1 DYSTHYMIC DISORDER: Status: RESOLVED | Noted: 2018-01-30 | Resolved: 2019-08-13

## 2019-08-13 LAB
CHOLEST SERPL-MCNC: 227 MG/DL
GLUCOSE SERPL-MCNC: 85 MG/DL (ref 70–105)
HBA1C MFR BLD: 6 % (ref 4–6)
HDLC SERPL-MCNC: 52 MG/DL (ref 23–92)
LDLC SERPL CALC-MCNC: 154 MG/DL
NONHDLC SERPL-MCNC: 175 MG/DL
TRIGL SERPL-MCNC: 104 MG/DL
TSH SERPL DL<=0.05 MIU/L-ACNC: 15.74 IU/ML (ref 0.34–5.6)

## 2019-08-13 PROCEDURE — 82947 ASSAY GLUCOSE BLOOD QUANT: CPT | Mod: ZL | Performed by: FAMILY MEDICINE

## 2019-08-13 PROCEDURE — 99396 PREV VISIT EST AGE 40-64: CPT | Performed by: FAMILY MEDICINE

## 2019-08-13 PROCEDURE — 36415 COLL VENOUS BLD VENIPUNCTURE: CPT | Mod: ZL | Performed by: FAMILY MEDICINE

## 2019-08-13 PROCEDURE — 83036 HEMOGLOBIN GLYCOSYLATED A1C: CPT | Mod: ZL | Performed by: FAMILY MEDICINE

## 2019-08-13 PROCEDURE — 80061 LIPID PANEL: CPT | Mod: ZL | Performed by: FAMILY MEDICINE

## 2019-08-13 PROCEDURE — 84443 ASSAY THYROID STIM HORMONE: CPT | Mod: ZL | Performed by: FAMILY MEDICINE

## 2019-08-13 RX ORDER — LEVOTHYROXINE SODIUM 100 UG/1
100 TABLET ORAL DAILY
Qty: 90 TABLET | Refills: 3 | Status: SHIPPED | OUTPATIENT
Start: 2019-08-13 | End: 2019-08-15

## 2019-08-13 RX ORDER — BUPROPION HYDROCHLORIDE 150 MG/1
150 TABLET ORAL EVERY MORNING
Qty: 90 TABLET | Refills: 0 | Status: SHIPPED | OUTPATIENT
Start: 2019-08-13 | End: 2019-11-19

## 2019-08-13 ASSESSMENT — ASTHMA QUESTIONNAIRES
ACUTE_EXACERBATION_TODAY: NO
ACT_TOTALSCORE: 24
QUESTION_3 LAST FOUR WEEKS HOW OFTEN DID YOUR ASTHMA SYMPTOMS (WHEEZING, COUGHING, SHORTNESS OF BREATH, CHEST TIGHTNESS OR PAIN) WAKE YOU UP AT NIGHT OR EARLIER THAN USUAL IN THE MORNING: NOT AT ALL
QUESTION_5 LAST FOUR WEEKS HOW WOULD YOU RATE YOUR ASTHMA CONTROL: WELL CONTROLLED
QUESTION_4 LAST FOUR WEEKS HOW OFTEN HAVE YOU USED YOUR RESCUE INHALER OR NEBULIZER MEDICATION (SUCH AS ALBUTEROL): NOT AT ALL
QUESTION_2 LAST FOUR WEEKS HOW OFTEN HAVE YOU HAD SHORTNESS OF BREATH: NOT AT ALL
QUESTION_1 LAST FOUR WEEKS HOW MUCH OF THE TIME DID YOUR ASTHMA KEEP YOU FROM GETTING AS MUCH DONE AT WORK, SCHOOL OR AT HOME: NONE OF THE TIME

## 2019-08-13 ASSESSMENT — ANXIETY QUESTIONNAIRES
2. NOT BEING ABLE TO STOP OR CONTROL WORRYING: NOT AT ALL
GAD7 TOTAL SCORE: 0
IF YOU CHECKED OFF ANY PROBLEMS ON THIS QUESTIONNAIRE, HOW DIFFICULT HAVE THESE PROBLEMS MADE IT FOR YOU TO DO YOUR WORK, TAKE CARE OF THINGS AT HOME, OR GET ALONG WITH OTHER PEOPLE: NOT DIFFICULT AT ALL
3. WORRYING TOO MUCH ABOUT DIFFERENT THINGS: NOT AT ALL
6. BECOMING EASILY ANNOYED OR IRRITABLE: NOT AT ALL
5. BEING SO RESTLESS THAT IT IS HARD TO SIT STILL: NOT AT ALL
1. FEELING NERVOUS, ANXIOUS, OR ON EDGE: NOT AT ALL
7. FEELING AFRAID AS IF SOMETHING AWFUL MIGHT HAPPEN: NOT AT ALL

## 2019-08-13 ASSESSMENT — MIFFLIN-ST. JEOR: SCORE: 1345.92

## 2019-08-13 ASSESSMENT — PAIN SCALES - GENERAL: PAINLEVEL: NO PAIN (0)

## 2019-08-13 ASSESSMENT — PATIENT HEALTH QUESTIONNAIRE - PHQ9: 5. POOR APPETITE OR OVEREATING: NOT AT ALL

## 2019-08-13 NOTE — LETTER
August 14, 2019      Mallorie Machado  [address] [facility]  [place] [address]        Dear Mallorie,     This shows your thyroid is not being treated fully.  If you are taking the meds daily, then we need to increase the dose.    Results for orders placed or performed in visit on 08/13/19   Hemoglobin A1c   Result Value Ref Range    Hemoglobin A1C 6.0 4.0 - 6.0 %   Lipid Panel   Result Value Ref Range    Cholesterol 227 (H) <200 mg/dL    Triglycerides 104 <150 mg/dL    HDL Cholesterol 52 23 - 92 mg/dL    LDL Cholesterol Calculated 154 (H) <100 mg/dL    Non HDL Cholesterol 175 (H) <130 mg/dL   Glucose   Result Value Ref Range    Glucose 85 70 - 105 mg/dL   TSH   Result Value Ref Range    Thyrotropin 15.74 (H) 0.34 - 5.60 IU/mL           Sincerely,        Jerardo Navarrete MD

## 2019-08-13 NOTE — NURSING NOTE
"Coming in for a physical and medication refillo    Chief Complaint   Patient presents with     Physical     yearly       Initial /60 (BP Location: Right arm, Patient Position: Sitting, Cuff Size: Adult Regular)   Pulse 67   Temp 98.6  F (37  C) (Tympanic)   Resp 14   Ht 1.594 m (5' 2.75\")   Wt 72.6 kg (160 lb)   SpO2 96%   BMI 28.57 kg/m   Estimated body mass index is 28.57 kg/m  as calculated from the following:    Height as of this encounter: 1.594 m (5' 2.75\").    Weight as of this encounter: 72.6 kg (160 lb).  Medication Reconciliation: complete    Bhavya Llanos LPN  "

## 2019-08-13 NOTE — PROGRESS NOTES
SUBJECTIVE:   CC: Mallorie Machado is an 43 year old woman who presents for preventive health visit.     Healthy Habits:    Do you get at least three servings of calcium containing foods daily (dairy, green leafy vegetables, etc.)? yes    Amount of exercise or daily activities, outside of work: 5 day(s) per week    Problems taking medications regularly No    Medication side effects: No    Have you had an eye exam in the past two years? yes    Do you see a dentist twice per year? no    Do you have sleep apnea, excessive snoring or daytime drowsiness?no      Wants a refill on her synthroid.    Today's PHQ-2 Score:   PHQ-2 ( 1999 [employer]) 8/13/2019 6/15/2018   Q1: Little interest or pleasure in doing things 0 0   Q2: Feeling down, depressed or hopeless 0 0   PHQ-2 Score 0 0       Abuse: Current or Past(Physical, Sexual or Emotional)- No  Do you feel safe in your environment? Yes    Social History     Tobacco Use     Smoking status: Current Every Day Smoker     Packs/day: 0.50     Types: Cigarettes     Smokeless tobacco: Never Used   Substance Use Topics     Alcohol use: No     Alcohol/week: 0.0 oz     If you drink alcohol do you typically have >3 drinks per day or >7 drinks per week? No                     Reviewed orders with patient.  Reviewed health maintenance and updated orders accordingly - Yes  Lab work is in process  Current Outpatient Medications   Medication Sig Dispense Refill     levothyroxine (SYNTHROID/LEVOTHROID) 100 MCG tablet Take 1 tablet (100 mcg) by mouth daily 90 tablet 3     albuterol (PROAIR HFA) 108 (90 Base) MCG/ACT Inhaler Inhale 2 puffs into the lungs 4 times daily as needed INHALE 2 PUFFS AS NEEDED FOR ASTHMA UP TO 4 TIMES DAILY 1 Inhaler 3     Allergies   Allergen Reactions     Hydrocodone-Acetaminophen Itching and Nausea     Meperidine Itching     No hives     Morphine      Other reaction(s): Erythema  Face & hands     Oxycodone-Acetaminophen Itching     Propoxyphene N-Apap      Other  reaction(s): Throat Swelling/Closing       Mammogram Screening: Patient under age 50, mutual decision reflected in health maintenance.      Pertinent mammograms are reviewed under the imaging tab.  History of abnormal Pap smear: NO - age 30-65 PAP every 5 years with negative HPV co-testing recommended     Reviewed and updated as needed this visit by clinical staff  Tobacco  Allergies  Meds  Med Hx  Soc Hx        Reviewed and updated as needed this visit by Provider        Past Medical History:   Diagnosis Date     Allergic rhinitis due to pollen     No Comments Provided     Cyst of ovary     1994     Dermatitis     scalp     Dysmenorrhea     No Comments Provided     Dysthymic disorder     No Comments Provided     Endometriosis     No Comments Provided     Hypothyroidism     No Comments Provided     Injury of lower back     02/07,Cervical disc injury, C5-6, with improvement (work comp related)     Low back pain     No Comments Provided     Obesity     No Comments Provided     Other cervical disc degeneration, unspecified cervical region     No Comments Provided     Other cervical disc displacement, unspecified cervical region     No Comments Provided     Personal history of other diseases of the female genital tract     G2, P2-0-0-2, vaginal deliveries     Uncomplicated asthma     No Comments Provided      Past Surgical History:   Procedure Laterality Date     ATTEMPTED ARTHROSCOPY      1/20/06,left wrist arthroscopy with debridement - Dr. Mahmood, [facility]     FUSION CERVICAL ANTERIOR ONE LEVEL      2008,C5-6     HYSTERECTOMY TOTAL ABDOMINAL, BILATERAL SALPINGO-OOPHORECTOMY, COMBINED      11/04,bilateral salpingo-oophorectomy     LAPAROSCOPY DIAGNOSTIC (GENERAL)      6/04,02/07     OTHER SURGICAL HISTORY       x 2,[id number],VAGINAL DELIVERY     OTHER SURGICAL HISTORY      1/19/2012,205448,REMOVAL OF FOREIGN BODY,ACDF C4-5 and C6-7 with hardware removal [Other][[[       ROS:  CONSTITUTIONAL: NEGATIVE for fever,  "chills, change in weight  INTEGUMENTARY/SKIN: NEGATIVE for worrisome rashes, moles or lesions  EYES: NEGATIVE for vision changes or irritation  ENT: NEGATIVE for ear, mouth and throat problems  RESP: NEGATIVE for significant cough or SOB  BREAST: NEGATIVE for masses, tenderness or discharge  CV: NEGATIVE for chest pain, palpitations or peripheral edema  GI: NEGATIVE for nausea, abdominal pain, heartburn, or change in bowel habits  : NEGATIVE for unusual urinary or vaginal symptoms. No vaginal bleeding.  MUSCULOSKELETAL: NEGATIVE for significant arthralgias or myalgia  NEURO: NEGATIVE for weakness, dizziness or paresthesias  PSYCHIATRIC: NEGATIVE for changes in mood or affect     OBJECTIVE:   /60 (BP Location: Right arm, Patient Position: Sitting, Cuff Size: Adult Regular)   Pulse 67   Temp 98.6  F (37  C) (Tympanic)   Resp 14   Ht 1.594 m (5' 2.75\")   Wt 72.6 kg (160 lb)   SpO2 96%   BMI 28.57 kg/m    EXAM:  GENERAL: healthy, alert and no distress  EYES: Eyes grossly normal to inspection, PERRL and conjunctivae and sclerae normal  HENT: ear canals and TM's normal, nose and mouth without ulcers or lesions  NECK: no adenopathy, no asymmetry, masses, or scars and thyroid normal to palpation  RESP: lungs clear to auscultation - no rales, rhonchi or wheezes  CV: regular rate and rhythm, normal S1 S2, no S3 or S4, no murmur, click or rub, no peripheral edema and peripheral pulses strong  ABDOMEN: soft, nontender, no hepatosplenomegaly, no masses and bowel sounds normal  MS: no gross musculoskeletal defects noted, no edema  SKIN: no suspicious lesions or rashes  NEURO: Normal strength and tone, mentation intact and speech normal  PSYCH: mentation appears normal, affect normal/bright    Diagnostic Test Results:  Labs reviewed in Epic        ASSESSMENT/PLAN:   (Z00.00) Routine general medical examination at a health care facility  (primary encounter diagnosis)  Comment: see below  Plan: Glucose, Lipid Panel    " "         (E03.9) Hypothyroidism, unspecified type  Comment: stable  Plan: levothyroxine (SYNTHROID/LEVOTHROID) 100 MCG         tablet, TSH             (R73.01) Impaired fasting glucose  Comment: stable  Plan: Hemoglobin A1c               COUNSELING:   Reviewed preventive health counseling, as reflected in patient instructions       Regular exercise       Vision screening       Hearing screening       (Emy)menopause management    Estimated body mass index is 28.57 kg/m  as calculated from the following:    Height as of this encounter: 1.594 m (5' 2.75\").    Weight as of this encounter: 72.6 kg (160 lb).    Weight management plan: Discussed healthy diet and exercise guidelines     reports that she has been smoking cigarettes.  She has been smoking about 0.50 packs per day. She has never used smokeless tobacco.  Tobacco Cessation Action Plan: Information offered: Patient not interested at this time    Counseling Resources:  ATP IV Guidelines  Pooled Cohorts Equation Calculator  Breast Cancer Risk Calculator  FRAX Risk Assessment  ICSI Preventive Guidelines  Dietary Guidelines for Americans, 2010  USDA's MyPlate  ASA Prophylaxis  Lung CA Screening    Jerardo Navarrete MD  [facility]  "

## 2019-08-14 ASSESSMENT — ASTHMA QUESTIONNAIRES: ACT_TOTALSCORE: 24

## 2019-08-14 ASSESSMENT — ANXIETY QUESTIONNAIRES: GAD7 TOTAL SCORE: 0

## 2019-08-15 RX ORDER — LEVOTHYROXINE SODIUM 112 UG/1
112 TABLET ORAL DAILY
Qty: 90 TABLET | Refills: 3 | Status: SHIPPED | OUTPATIENT
Start: 2019-08-15 | End: 2020-02-17

## 2019-11-01 ENCOUNTER — OFFICE VISIT (OUTPATIENT)
Dept: FAMILY MEDICINE | Facility: OTHER | Age: 44
End: 2019-11-01
Attending: PHYSICIAN ASSISTANT
Payer: COMMERCIAL

## 2019-11-01 VITALS
HEIGHT: 62 IN | SYSTOLIC BLOOD PRESSURE: 124 MMHG | DIASTOLIC BLOOD PRESSURE: 70 MMHG | WEIGHT: 158.5 LBS | BODY MASS INDEX: 29.17 KG/M2 | TEMPERATURE: 98.6 F | OXYGEN SATURATION: 95 % | HEART RATE: 69 BPM | RESPIRATION RATE: 16 BRPM

## 2019-11-01 DIAGNOSIS — J01.00 ACUTE NON-RECURRENT MAXILLARY SINUSITIS: Primary | ICD-10-CM

## 2019-11-01 PROCEDURE — 99214 OFFICE O/P EST MOD 30 MIN: CPT | Performed by: PHYSICIAN ASSISTANT

## 2019-11-01 ASSESSMENT — MIFFLIN-ST. JEOR: SCORE: 1327.2

## 2019-11-01 ASSESSMENT — PAIN SCALES - GENERAL: PAINLEVEL: MODERATE PAIN (4)

## 2019-11-01 NOTE — PROGRESS NOTES
SUBJECTIVE: Mallorie Machado is a 43 year old female patient complaining of cold the past weekend and a few days ago her sinus pressure got worse, thick nasal sputum with blood, left ear pressure, mild frontal headache, PND, sinus pain and teeth pain  Productive cough, no chest pain or shortness of breath  No fever    Treatments: antihistamines to dry things up  History of asthma - yes  environmental allergies - seasonal, she takes cetrizine    Tobacco use - daily smoker, 1/2 ppd    Past Medical History:   Diagnosis Date     Allergic rhinitis due to pollen     No Comments Provided     Cyst of ovary     1994     Dermatitis     scalp     Dysmenorrhea     No Comments Provided     Dysthymic disorder     No Comments Provided     Endometriosis     No Comments Provided     Hypothyroidism     No Comments Provided     Injury of lower back     02/07,Cervical disc injury, C5-6, with improvement (work comp related)     Low back pain     No Comments Provided     Obesity     No Comments Provided     Other cervical disc degeneration, unspecified cervical region     No Comments Provided     Other cervical disc displacement, unspecified cervical region     No Comments Provided     Personal history of other diseases of the female genital tract     G2, P2-0-0-2, vaginal deliveries     Uncomplicated asthma     No Comments Provided     Current Outpatient Medications   Medication     albuterol (PROAIR HFA) 108 (90 Base) MCG/ACT Inhaler     buPROPion (WELLBUTRIN XL) 150 MG 24 hr tablet     levothyroxine (SYNTHROID/LEVOTHROID) 112 MCG tablet     No current facility-administered medications for this visit.         Allergies   Allergen Reactions     Hydrocodone-Acetaminophen Itching and Nausea     Meperidine Itching     No hives     Morphine      Other reaction(s): Erythema  Face & hands     Oxycodone-Acetaminophen Itching     Propoxyphene N-Apap      Other reaction(s): Throat Swelling/Closing         ROS  General: fatigue, no fever  HENT:  "POSITIVE per HPI  Respiratory POSITIVE per HPI  Abdomen: negative    OBJECTIVE:   Vitals:    11/01/19 1732   BP: 124/70   BP Location: Right arm   Patient Position: Sitting   Cuff Size: Adult Regular   Pulse: 69   Resp: 16   Temp: 98.6  F (37  C)   TempSrc: Tympanic   SpO2: 95%   Weight: 71.9 kg (158 lb 8 oz)   Height: 1.575 m (5' 2\")     The patient appears healthy, alert and no distress.   EARS: positive findings: moderate effusion bilaterally  NOSE/SINUS: positive findings: mucosa erythematous and swollen  Sinus palpation: Frontal sinus and Maxillary sinus tender to palpation   THROAT: moderate erythema   NECK:Neck supple. No adenopathy. Thyroid symmetric, normal size,   Cardiac: normal RR, no murmur  CHEST: normal respiration, clear to ausculation    ASSESSMENT:   (J01.00) Acute non-recurrent maxillary sinusitis  (primary encounter diagnosis)  Plan: amoxicillin-clavulanate (AUGMENTIN) 875-125 MG         tablet    Discussed use of OTC medications to treat sx of sinusitis  RX per EPIC  Augmentin 875 1 tablet twice daily x 7 days  Discussed symptomatic treatments per AVS  Follow up with PCP if symptoms persist or worsen  Patient received verbal and written instruction including review of warning signs    Sabra Oliva PA-C on 11/1/2019 at 5:51 PM          .  "

## 2019-11-01 NOTE — PATIENT INSTRUCTIONS
Acute maxillary & frontal sinusitis  Warm compress, hot steamy shower  OTC guaifenesin with dextromethorphan (Mucinex/robitussin DM) take as directed  OTC decongestant (sudafed),   OTC 3 day nasal spray - Afrin or OTC inhaled corticosteroid - Flonase,   OTC antihistamine - cetirizine as directed,   OTC Nasal sinus rinse.   OTC Ocean gel to help with nasal bleeding  Humidifier at night time  Ibuprofen or tylenol as directed for discomfort or fever  Print prescription for antibiotic given if no improvement in 3 days or for worsening  Start augmentin 875 mg oral tablet, take twice daily for 7 days  Return to clinic if symptoms persist or worsen      Patient Education     Sinusitis (Antibiotic Treatment)    The sinuses are air-filled spaces within the bones of the face. They connect to the inside of the nose. Sinusitis is an inflammation of the tissue that lines the sinuses. Sinusitis can occur during a cold. It can also happen due to allergies to pollens and other particles in the air. Sinusitis can cause symptoms of sinus congestion and a feeling of fullness. A sinus infection causes fever, headache, and facial pain. There is often green or yellow fluid draining from the nose or into the back of the throat (post-nasal drip). You have been given antibiotics to treat this condition.  Home care    Take the full course of antibiotics as instructed. Do not stop taking them, even when you feel better.    Drink plenty of water, hot tea, and other liquids. This may help thin nasal mucus. It also may help your sinuses drain fluids.    Heat may help soothe painful areas of your face. Use a towel soaked in hot water. Or,  the shower and direct the warm spray onto your face. Using a vaporizer along with a menthol rub at night may also help soothe symptoms.     An expectorant with guaifenesin may help thin nasal mucus and help your sinuses drain fluids.    You can use an over-the-counter decongestant, unless a similar  medicine was prescribed to you. Nasal sprays work the fastest. Use one that contains phenylephrine or oxymetazoline. First blow your nose gently. Then use the spray. Do not use these medicines more often than directed on the label. If you do, your symptoms may get worse. You may also take pills that contain pseudoephedrine. Don t use products that combine multiple medicines. This is because side effects may be increased. Read labels. You can also ask the pharmacist for help. (People with high blood pressure should not use decongestants. They can raise blood pressure.)    Over-the-counter antihistamines may help if allergies contributed to your sinusitis.      Do not use nasal rinses or irrigation during an acute sinus infection, unless your healthcare provider tells you to. Rinsing may spread the infection to other areas in your sinuses.    Use acetaminophen or ibuprofen to control pain, unless another pain medicine was prescribed to you. If you have chronic liver or kidney disease or ever had a stomach ulcer, talk with your healthcare provider before using these medicines. (Aspirin should never be taken by anyone under age 18 who is ill with a fever. It may cause severe liver damage.)    Don't smoke. This can make symptoms worse.  Follow-up care  Follow up with your healthcare provider or our staff if you are not better in 1 week.  When to seek medical advice  Call your healthcare provider if any of these occur:    Facial pain or headache that gets worse    Stiff neck    Unusual drowsiness or confusion    Swelling of your forehead or eyelids    Vision problems, such as blurred or double vision    Fever of 100.4 F (38 C) or higher, or as directed by your healthcare provider    Seizure    Breathing problems    Symptoms don't go away in 10 days  Prevention  Here are steps you can take to help prevent an infection:    Keep good hand washing habits.    Don t have close contact with people who have sore throats, colds,  or other upper respiratory infections.    Don t smoke, and stay away from secondhand smoke.    Stay up to date with of your vaccines.  Date Last Reviewed: 11/1/2017 2000-2018 [employer]. [address] [place] [address], [place] [address]. All rights reserved. This information is not intended as a substitute for professional medical care. Always follow your healthcare professional's instructions.

## 2019-11-01 NOTE — NURSING NOTE
"Patient presents to clinic for sinus infection x a few days. States blood in mucous. States allergy pill is not working for her.   Chief Complaint   Patient presents with     Sinus Problem       Initial /70 (BP Location: Right arm, Patient Position: Sitting, Cuff Size: Adult Regular)   Pulse 69   Temp 98.6  F (37  C) (Tympanic)   Resp 16   Ht 1.575 m (5' 2\")   Wt 71.9 kg (158 lb 8 oz)   SpO2 95%   Breastfeeding? No   BMI 28.99 kg/m   Estimated body mass index is 28.99 kg/m  as calculated from the following:    Height as of this encounter: 1.575 m (5' 2\").    Weight as of this encounter: 71.9 kg (158 lb 8 oz).  Medication Reconciliation: complete    Cynthia Umanzor LPN    "

## 2019-11-19 DIAGNOSIS — Z72.0 TOBACCO ABUSE: ICD-10-CM

## 2019-11-21 RX ORDER — BUPROPION HYDROCHLORIDE 150 MG/1
TABLET ORAL
Qty: 30 TABLET | Refills: 8 | Status: SHIPPED | OUTPATIENT
Start: 2019-11-21 | End: 2021-07-13

## 2019-11-21 NOTE — TELEPHONE ENCOUNTER
"Requested Prescriptions   Pending Prescriptions Disp Refills     buPROPion (WELLBUTRIN XL) 150 MG 24 hr tablet [Pharmacy Med Name: BUPROPION 150MG ER (XL) TABLET] 30 tablet      Sig: TAKE ONE TABLET EVERY MORNING       SSRIs Protocol Passed - 11/19/2019  3:48 PM        Passed - Recent (12 mo) or future (30 days) visit within the authorizing provider's specialty     Patient has had an office visit with the authorizing provider or a provider within the authorizing providers department within the previous 12 mos or has a future within next 30 days. See \"Patient Info\" tab in inbasket, or \"Choose Columns\" in Meds & Orders section of the refill encounter.              Passed - Medication is Bupropion     If the medication is Bupropion (Wellbutrin), and the patient is taking for smoking cessation; OK to refill.          Passed - Medication is active on med list        Passed - Patient is age 18 or older        Passed - No active pregnancy on record        Passed - No positive pregnancy test in last 12 months        LOV 8/13/19  Prescription approved per [facility] [place].  Brenda J. Goodell, RN on 11/21/2019 at 2:06 PM    "

## 2020-02-14 DIAGNOSIS — E03.9 HYPOTHYROIDISM, UNSPECIFIED TYPE: Primary | ICD-10-CM

## 2020-02-17 NOTE — TELEPHONE ENCOUNTER
[id number] sent Rx request for the following:      LEVOTHYROXINE 112MCG TABLET   Sig: TAKE 1 TABLET BY MOUTH DAILY     Last Prescription:  levothyroxine (SYNTHROID/LEVOTHROID) 112 MCG tablet 90 tablet 3 8/15/2019  --   Sig - Route: Take 1 tablet (112 mcg) by mouth daily - Oral     [id number] - [place], [place] - [address]     Last Office Visit:              19 (Physical)  Future Office visit:           None.    Routing refill request to provider for review/approval because:  Called and spoke with Sally, pharmacist at [facility], after verifying Pt's last name and . She states prescription was transferred from [facility] in November and dispensed #30 tablets in November, January and February. Pt is somehow out of refills. Pharmacy requesting new prescription at this time.     Unable to complete prescription refill per RN Medication Refill Policy. Zaida Mercado RN .............. 2020  9:14 AM

## 2020-02-18 RX ORDER — LEVOTHYROXINE SODIUM 112 UG/1
TABLET ORAL
Qty: 90 TABLET | Refills: 1 | Status: SHIPPED | OUTPATIENT
Start: 2020-02-18 | End: 2020-03-17

## 2020-03-11 ENCOUNTER — TELEPHONE (OUTPATIENT)
Dept: FAMILY MEDICINE | Facility: OTHER | Age: 45
End: 2020-03-11

## 2020-03-11 DIAGNOSIS — E03.9 HYPOTHYROIDISM, UNSPECIFIED TYPE: Primary | ICD-10-CM

## 2020-03-11 NOTE — TELEPHONE ENCOUNTER
I made patient an appt. For Thyroid check for this Friday 13th needs orders put in. Then [facility].

## 2020-03-13 DIAGNOSIS — E03.9 HYPOTHYROIDISM, UNSPECIFIED TYPE: ICD-10-CM

## 2020-03-13 LAB — TSH SERPL DL<=0.05 MIU/L-ACNC: 1.85 IU/ML (ref 0.34–5.6)

## 2020-03-13 PROCEDURE — 84443 ASSAY THYROID STIM HORMONE: CPT | Mod: ZL | Performed by: FAMILY MEDICINE

## 2020-03-13 PROCEDURE — 36415 COLL VENOUS BLD VENIPUNCTURE: CPT | Mod: ZL | Performed by: FAMILY MEDICINE

## 2020-03-17 ENCOUNTER — TELEPHONE (OUTPATIENT)
Dept: FAMILY MEDICINE | Facility: OTHER | Age: 45
End: 2020-03-17

## 2020-03-17 DIAGNOSIS — E03.9 HYPOTHYROIDISM, UNSPECIFIED TYPE: ICD-10-CM

## 2020-03-17 RX ORDER — LEVOTHYROXINE SODIUM 112 UG/1
112 TABLET ORAL DAILY
Qty: 90 TABLET | Refills: 3 | Status: SHIPPED | OUTPATIENT
Start: 2020-03-17 | End: 2021-03-23

## 2020-03-17 NOTE — TELEPHONE ENCOUNTER
Patient called in regards to appointment today. She states that it is just for her thyroid results and would rather not come in if she doesn't have to. Please call her back in regards to this. Thank you. Sanjuana Cordoba on 3/17/2020 at 8:34 AM

## 2020-03-17 NOTE — TELEPHONE ENCOUNTER
Patient is also looking for a refill of her thyroid medications based on her lab results she was going to get today at her appointment that was canceled.     Annemarie Ny on 3/17/2020 at 10:34 AM

## 2020-03-17 NOTE — TELEPHONE ENCOUNTER
Let her know labs normal, meds refilled for 1 year. Follow up in 1 year.  Jerardo Navarrete MD on 3/17/2020 at 2:10 PM

## 2020-04-15 ENCOUNTER — HOSPITAL ENCOUNTER (OUTPATIENT)
Dept: CT IMAGING | Facility: OTHER | Age: 45
End: 2020-04-15
Attending: FAMILY MEDICINE
Payer: COMMERCIAL

## 2020-04-15 ENCOUNTER — OFFICE VISIT (OUTPATIENT)
Dept: FAMILY MEDICINE | Facility: OTHER | Age: 45
End: 2020-04-15
Attending: FAMILY MEDICINE
Payer: COMMERCIAL

## 2020-04-15 DIAGNOSIS — M54.50 ACUTE MIDLINE LOW BACK PAIN WITHOUT SCIATICA: ICD-10-CM

## 2020-04-15 DIAGNOSIS — M54.50 ACUTE MIDLINE LOW BACK PAIN WITHOUT SCIATICA: Primary | ICD-10-CM

## 2020-04-15 DIAGNOSIS — R31.9 HEMATURIA, UNSPECIFIED TYPE: ICD-10-CM

## 2020-04-15 DIAGNOSIS — I99.8 VASCULAR CALCIFICATION: ICD-10-CM

## 2020-04-15 DIAGNOSIS — Z72.0 TOBACCO ABUSE: ICD-10-CM

## 2020-04-15 LAB
ALBUMIN UR-MCNC: NEGATIVE MG/DL
APPEARANCE UR: CLEAR
BILIRUB UR QL STRIP: NEGATIVE
COLOR UR AUTO: ABNORMAL
GLUCOSE UR STRIP-MCNC: NEGATIVE MG/DL
HGB UR QL STRIP: ABNORMAL
KETONES UR STRIP-MCNC: NEGATIVE MG/DL
LEUKOCYTE ESTERASE UR QL STRIP: NEGATIVE
MUCOUS THREADS #/AREA URNS LPF: PRESENT /LPF
NITRATE UR QL: NEGATIVE
PH UR STRIP: 7 PH (ref 5–7)
RBC #/AREA URNS AUTO: 4 /HPF (ref 0–2)
SOURCE: ABNORMAL
SP GR UR STRIP: 1.02 (ref 1–1.03)
SQUAMOUS #/AREA URNS AUTO: 2 /HPF (ref 0–1)
UROBILINOGEN UR STRIP-MCNC: NORMAL MG/DL (ref 0–2)
WBC #/AREA URNS AUTO: 1 /HPF (ref 0–5)

## 2020-04-15 PROCEDURE — 81001 URINALYSIS AUTO W/SCOPE: CPT | Mod: ZL | Performed by: FAMILY MEDICINE

## 2020-04-15 PROCEDURE — 99214 OFFICE O/P EST MOD 30 MIN: CPT | Performed by: FAMILY MEDICINE

## 2020-04-15 PROCEDURE — 87086 URINE CULTURE/COLONY COUNT: CPT | Mod: ZL | Performed by: FAMILY MEDICINE

## 2020-04-15 PROCEDURE — 74176 CT ABD & PELVIS W/O CONTRAST: CPT

## 2020-04-15 RX ORDER — CYCLOBENZAPRINE HCL 10 MG
10 TABLET ORAL 3 TIMES DAILY PRN
Qty: 15 TABLET | Refills: 0 | Status: SHIPPED | OUTPATIENT
Start: 2020-04-15 | End: 2020-04-20

## 2020-04-15 ASSESSMENT — ENCOUNTER SYMPTOMS
RESPIRATORY NEGATIVE: 1
NEUROLOGICAL NEGATIVE: 1
CARDIOVASCULAR NEGATIVE: 1
CONSTITUTIONAL NEGATIVE: 1

## 2020-04-15 NOTE — PROGRESS NOTES
cNursing Notes:   Zaida Mar LPN  4/15/2020  8:24 AM  Signed  Patient presents to clinic experiencing left middle back pain which radiates around to front and down left leg.  Pain rated at 9 since yesterday.  Medication Reconciliation: complete    Zaida Mar LPN         SUBJECTIVE:   CC:  Mallorie Machado is a 44 year old female who presents to clinic today for the following health issues: Mid back pain    HPI  Mallorie Machado is a 44 year old female who presents for mid back pain.  Currently rates her pain at a 9 out of 10.  The nurses given her an ice pack today.  She woke up in pain yesterday morning.  Thought she slept wrong.  Was urinating more frequently too. Took ibuprofen for her symptoms - helped some. Stretching and a hot shower helped too. The frequent urination is still occurring.  No dysuria.  No gross hematuria.  A little nausea.  No fever.   History of UTI about 3 years ago.    All positions seem to hurt.    Pain is more left than right and radiates around the front a little, also a little down her left leg.      Nicotine use at under 1/2 packs per day  Cholesterol - uncertain.  Lab Results   Component Value Date    CHOL 227 08/13/2019     Lab Results   Component Value Date    HDL 52 08/13/2019     Lab Results   Component Value Date     08/13/2019     Lab Results   Component Value Date    TRIG 104 08/13/2019     No results found for: CHOLHDLRATIO         Allergies   Allergen Reactions     Hydrocodone-Acetaminophen Itching and Nausea     Meperidine Itching     No hives     Morphine      Other reaction(s): Erythema  Face & hands     Oxycodone-Acetaminophen Itching     Propoxyphene N-Apap      Other reaction(s): Throat Swelling/Closing     Current Outpatient Medications   Medication     albuterol (PROAIR HFA) 108 (90 Base) MCG/ACT Inhaler     buPROPion (WELLBUTRIN XL) 150 MG 24 hr tablet     cyclobenzaprine (FLEXERIL) 10 MG tablet     levothyroxine (SYNTHROID/LEVOTHROID) 112 MCG tablet      No current facility-administered medications for this visit.       Past Medical History:   Diagnosis Date     Allergic rhinitis due to pollen     No Comments Provided     Cyst of ovary          Dermatitis     scalp     Dysmenorrhea     No Comments Provided     Dysthymic disorder     No Comments Provided     Endometriosis     No Comments Provided     Hypothyroidism     No Comments Provided     Injury of lower back     ,Cervical disc injury, C5-6, with improvement (work comp related)     Low back pain     No Comments Provided     Obesity     No Comments Provided     Other cervical disc degeneration, unspecified cervical region     No Comments Provided     Other cervical disc displacement, unspecified cervical region     No Comments Provided     Personal history of other diseases of the female genital tract     G2, P2-0-0-2, vaginal deliveries     Uncomplicated asthma     No Comments Provided      Past Surgical History:   Procedure Laterality Date     ATTEMPTED ARTHROSCOPY      06,left wrist arthroscopy with debridement - Dr. Mahmood, [place]     FUSION CERVICAL ANTERIOR ONE LEVEL      ,C5-6     HYSTERECTOMY TOTAL ABDOMINAL, BILATERAL SALPINGO-OOPHORECTOMY, COMBINED      ,bilateral salpingo-oophorectomy     LAPAROSCOPY DIAGNOSTIC (GENERAL)      ,     OTHER SURGICAL HISTORY       x 2,TSG972,VAGINAL DELIVERY     OTHER SURGICAL HISTORY      2012,2054,REMOVAL OF FOREIGN BODY,ACDF C4-5 and C6-7 with hardware removal [Other][[[     Family History   Problem Relation Age of Onset     Diabetes Mother         Diabetes,Type II     Other - See Comments Mother         hypothyroidism     Diabetes Sister         Diabetes,DM-I,  of splenic injury/infarct       Review of Systems   Constitutional: Negative.    HENT: Negative.    Respiratory: Negative.    Cardiovascular: Negative.    Neurological: Negative.         PHQ-2 Score:     PHQ-2 (  [employer]) 4/15/2020 2019   Q1: Little  interest or pleasure in doing things 0 0   Q2: Feeling down, depressed or hopeless 0 0   PHQ-2 Score 0 0         PHQ-9 SCORE 9/27/2016 8/3/2017   PHQ-9 Total Score 0 6         OBJECTIVE:     There were no vitals taken for this visit.  There is no height or weight on file to calculate BMI.  Physical Exam  Vitals signs and nursing note reviewed.   Constitutional:       Comments: Moderately uncomfortable   HENT:      Head: Normocephalic and atraumatic.   Abdominal:      General: Abdomen is flat.      Palpations: Abdomen is soft.      Comments: Left-sided abdominal tenderness, no rebound or guarding.  Mild left CVA tenderness.   Musculoskeletal:      Comments: Majority of her tenderness is left upper back, very mild on the right.  Her range of motion is normal but with coming back to standing, has increased pain.  Straight leg raise is negative.  Reflexes normal, sensation normal.   Neurological:      Mental Status: She is alert.            Results for orders placed or performed during the hospital encounter of 04/15/20   CT Abdomen Pelvis w/o Contrast     Status: None    Narrative    EXAMINATION: CT ABDOMEN PELVIS W/O CONTRAST, 4/15/2020 9:03 AM    HISTORY: Flank pain, stone disease suspected; Acute midline low back  pain without sciatica; Hematuria, unspecified type    COMPARISON: None    TECHNIQUE:  Helical CT images from the lung bases through the  symphysis pubis were obtained without IV contrast.     FINDINGS:    Pancreatico hepatobiliary: Normal appearance of the liver,  gallbladder, bile ducts, pancreas, and spleen without benefit of IV  contrast.    Genitourinary:  Normal appearance of the adrenal glands, kidneys,  ureters, and bladder. No urinary stones are identified. No evidence of  obstruction. The uterus and ovaries been resected.    Gastrointestinal:  Moderate amount stool in colon. The distal  esophagus, stomach, and bowel appear normal. The appendix appears  normal.    Lymph nodes:  No  lymphadenopathy.    Vasculature:  Minimal vascular calcifications. No aneurysm identified.    Lung bases: Normal lung bases.    Musculoskeletal: No worrisome bone lesions. It is mild degenerative  arthritis lower lumbar spine and pelvis.      Impression    IMPRESSION:   1. No urinary stones nor urinary obstruction identified.  2. Vascular calcifications.  3. Hysterectomy and bilateral salpingo-oophorectomy     RAVI BAKER MD   Results for orders placed or performed in visit on 04/15/20   UA reflex to Microscopic     Status: Abnormal   Result Value Ref Range    Color Urine Light Yellow     Appearance Urine Clear     Glucose Urine Negative NEG^Negative mg/dL    Bilirubin Urine Negative NEG^Negative    Ketones Urine Negative NEG^Negative mg/dL    Specific Gravity Urine 1.021 1.003 - 1.035    Blood Urine Small (A) NEG^Negative    pH Urine 7.0 5.0 - 7.0 pH    Protein Albumin Urine Negative NEG^Negative mg/dL    Urobilinogen mg/dL Normal 0.0 - 2.0 mg/dL    Nitrite Urine Negative NEG^Negative    Leukocyte Esterase Urine Negative NEG^Negative    Source Midstream Urine     RBC Urine 4 (H) 0 - 2 /HPF    WBC Urine 1 0 - 5 /HPF    Squamous Epithelial /HPF Urine 2 (H) 0 - 1 /HPF    Mucous Urine Present (A) NEG^Negative /LPF         ASSESSMENT/PLAN:       ICD-10-CM    1. Acute midline low back pain without sciatica  M54.5 UA reflex to Microscopic     CT Abdomen Pelvis w/o Contrast     cyclobenzaprine (FLEXERIL) 10 MG tablet   2. Hematuria, unspecified type  R31.9 CT Abdomen Pelvis w/o Contrast   3. Tobacco abuse  Z72.0    4. Vascular calcification  I99.8             PLAN:  1.  CT scan is reviewed with patient.  No evidence on CT scan of hydronephrosis nor of kidney stone.  2.  Back pain most likely musculoskeletal.  She is to continue with ibuprofen as needed.  Prescriptions also given for Flexeril 10 mg 3 times daily as needed for additional pain.  We discussed other over-the-counter medications and products that could be  helpful.  Potential side effects of these medications are discussed with her.  If symptoms persist or worsen, she may need repeat evaluation.  There is no rash present today, symptoms did not seem to be consistent with shingles.  3.  We discussed the calcification/vascular changes on her CT scan.  Risk factors for this include tobacco use.  Encourage patient to cut down/quit smoking.  Also, at next follow-up with her primary care provider, recommend a follow-up lipid panel and diabetes testing.      REYNOLD SANDOVAL MD  [facility] AND [facility]    This note was created using voice recognition software and was screened for errors in transcription.

## 2020-04-15 NOTE — NURSING NOTE
Patient presents to clinic experiencing left middle back pain which radiates around to front and down left leg.  Pain rated at 9 since yesterday.  Medication Reconciliation: complete    Zaida Mar LPN

## 2020-04-16 ASSESSMENT — ASTHMA QUESTIONNAIRES: ACT_TOTALSCORE: 25

## 2020-04-17 LAB
BACTERIA SPEC CULT: NO GROWTH
SPECIMEN SOURCE: NORMAL

## 2021-03-23 DIAGNOSIS — E03.9 HYPOTHYROIDISM, UNSPECIFIED TYPE: ICD-10-CM

## 2021-03-23 RX ORDER — LEVOTHYROXINE SODIUM 112 UG/1
112 TABLET ORAL DAILY
Qty: 90 TABLET | Refills: 0 | Status: SHIPPED | OUTPATIENT
Start: 2021-03-23 | End: 2021-06-24

## 2021-03-23 NOTE — TELEPHONE ENCOUNTER
[id number] sent Rx request for the following:   levothyroxine (SYNTHROID/LEVOTHROID) 112 MCG tablet  Sig TAKE 1 TABLET (112 MCG) BY MOUTH DAILY    Last Prescription Date:   03/17/2020  Last Fill Qty/Refills:         90, R-3    Last Office Visit:              04/15/2020 (Tl Turcios)   Future Office visit:           None noted   Thyroid Protocol Failed - 3/23/2021 12:22 PM        Failed - Normal TSH on file in past 12 months     No lab results found.         Thyroid labs 03/13/2020, out of date. Patient due for annual review. Letter sent. Prescription approved per [facility] Refill Protocol for 90 day damien refill with notation made to requesting pharmacy. Parul Burr RN ....................  3/23/2021   12:46 PM

## 2021-03-23 NOTE — LETTER
March 23, 2021      Mallorie Machado  [address] [address] [address]        Dear Mallorie,         A refill of levothyroxine (SYNTHROID/LEVOTHROID) 112 MCG tablet has been requested by your pharmacy.  We noticed that it has been greater than 12 months since your last comprehensive visit and labs with Jerardo Navarrete MD.  A limited 90 day supply has been sent to your pharmacy at this time.    Additional refills require a medication management appointment.  Your health is very important to us.  Please call the clinic at [phone] to schedule your appointment.    Thank you,    The Refill Nurse  [facility]

## 2021-06-24 DIAGNOSIS — E03.9 HYPOTHYROIDISM, UNSPECIFIED TYPE: ICD-10-CM

## 2021-06-24 RX ORDER — LEVOTHYROXINE SODIUM 112 UG/1
112 TABLET ORAL DAILY
Qty: 30 TABLET | Refills: 0 | Status: SHIPPED | OUTPATIENT
Start: 2021-06-24 | End: 2021-07-13

## 2021-06-24 NOTE — TELEPHONE ENCOUNTER
The patient stated she needs a refill of her medication because she is out.  She is scheduled to see Dr Navarrete on Tuesday, July 13, 2021.  Please advise.

## 2021-06-24 NOTE — TELEPHONE ENCOUNTER
Spoke with patient and she needs her levothyroxine refilled.  Bia Costa LPN ....................  6/24/2021   11:50 AM

## 2021-07-13 ENCOUNTER — OFFICE VISIT (OUTPATIENT)
Dept: FAMILY MEDICINE | Facility: OTHER | Age: 46
End: 2021-07-13
Attending: FAMILY MEDICINE
Payer: COMMERCIAL

## 2021-07-13 VITALS
DIASTOLIC BLOOD PRESSURE: 64 MMHG | SYSTOLIC BLOOD PRESSURE: 106 MMHG | OXYGEN SATURATION: 96 % | TEMPERATURE: 97.1 F | WEIGHT: 131.6 LBS | RESPIRATION RATE: 16 BRPM | BODY MASS INDEX: 24.07 KG/M2 | HEART RATE: 73 BPM

## 2021-07-13 DIAGNOSIS — Z12.31 ENCOUNTER FOR SCREENING MAMMOGRAM FOR BREAST CANCER: ICD-10-CM

## 2021-07-13 DIAGNOSIS — Z12.11 COLON CANCER SCREENING: ICD-10-CM

## 2021-07-13 DIAGNOSIS — E03.9 HYPOTHYROIDISM, UNSPECIFIED TYPE: Primary | ICD-10-CM

## 2021-07-13 PROBLEM — M54.50 LUMBAGO: Status: RESOLVED | Noted: 2018-01-30 | Resolved: 2021-07-13

## 2021-07-13 PROBLEM — E66.9 OBESITY: Status: RESOLVED | Noted: 2018-01-30 | Resolved: 2021-07-13

## 2021-07-13 LAB
HOLD SPECIMEN: NORMAL
TSH SERPL DL<=0.005 MIU/L-ACNC: 3.92 MU/L (ref 0.4–4)

## 2021-07-13 PROCEDURE — 84443 ASSAY THYROID STIM HORMONE: CPT | Mod: ZL | Performed by: FAMILY MEDICINE

## 2021-07-13 PROCEDURE — 99213 OFFICE O/P EST LOW 20 MIN: CPT | Performed by: FAMILY MEDICINE

## 2021-07-13 PROCEDURE — 36415 COLL VENOUS BLD VENIPUNCTURE: CPT | Mod: ZL | Performed by: FAMILY MEDICINE

## 2021-07-13 RX ORDER — LEVOTHYROXINE SODIUM 112 UG/1
112 TABLET ORAL DAILY
Qty: 90 TABLET | Refills: 4 | Status: SHIPPED | OUTPATIENT
Start: 2021-07-13 | End: 2022-05-04

## 2021-07-13 ASSESSMENT — ANXIETY QUESTIONNAIRES
IF YOU CHECKED OFF ANY PROBLEMS ON THIS QUESTIONNAIRE, HOW DIFFICULT HAVE THESE PROBLEMS MADE IT FOR YOU TO DO YOUR WORK, TAKE CARE OF THINGS AT HOME, OR GET ALONG WITH OTHER PEOPLE: NOT DIFFICULT AT ALL
GAD7 TOTAL SCORE: 0
2. NOT BEING ABLE TO STOP OR CONTROL WORRYING: NOT AT ALL
3. WORRYING TOO MUCH ABOUT DIFFERENT THINGS: NOT AT ALL
7. FEELING AFRAID AS IF SOMETHING AWFUL MIGHT HAPPEN: NOT AT ALL
5. BEING SO RESTLESS THAT IT IS HARD TO SIT STILL: NOT AT ALL
1. FEELING NERVOUS, ANXIOUS, OR ON EDGE: NOT AT ALL
6. BECOMING EASILY ANNOYED OR IRRITABLE: NOT AT ALL

## 2021-07-13 ASSESSMENT — ASTHMA QUESTIONNAIRES
QUESTION_4 LAST FOUR WEEKS HOW OFTEN HAVE YOU USED YOUR RESCUE INHALER OR NEBULIZER MEDICATION (SUCH AS ALBUTEROL): NOT AT ALL
QUESTION_5 LAST FOUR WEEKS HOW WOULD YOU RATE YOUR ASTHMA CONTROL: WELL CONTROLLED
QUESTION_2 LAST FOUR WEEKS HOW OFTEN HAVE YOU HAD SHORTNESS OF BREATH: NOT AT ALL
QUESTION_3 LAST FOUR WEEKS HOW OFTEN DID YOUR ASTHMA SYMPTOMS (WHEEZING, COUGHING, SHORTNESS OF BREATH, CHEST TIGHTNESS OR PAIN) WAKE YOU UP AT NIGHT OR EARLIER THAN USUAL IN THE MORNING: NOT AT ALL
ACT_TOTALSCORE: 24
QUESTION_1 LAST FOUR WEEKS HOW MUCH OF THE TIME DID YOUR ASTHMA KEEP YOU FROM GETTING AS MUCH DONE AT WORK, SCHOOL OR AT HOME: NONE OF THE TIME

## 2021-07-13 ASSESSMENT — PAIN SCALES - GENERAL: PAINLEVEL: NO PAIN (0)

## 2021-07-13 ASSESSMENT — PATIENT HEALTH QUESTIONNAIRE - PHQ9: 5. POOR APPETITE OR OVEREATING: NOT AT ALL

## 2021-07-13 NOTE — NURSING NOTE
"Coming in for a thyroid check up and refill    Chief Complaint   Patient presents with     Recheck Medication     thyroid meds, labs       Initial /64   Pulse 73   Temp 97.1  F (36.2  C)   Resp 16   Wt 59.7 kg (131 lb 9.6 oz)   SpO2 96%   BMI 24.07 kg/m   Estimated body mass index is 24.07 kg/m  as calculated from the following:    Height as of 11/1/19: 1.575 m (5' 2\").    Weight as of this encounter: 59.7 kg (131 lb 9.6 oz).  Medication Reconciliation: complete.  FOOD SECURITY SCREENING QUESTIONS  Hunger Vital Signs:  Within the past 12 months we worried whether our food would run out before we got money to buy more. Never  Within the past 12 months the food we bought just didn't last and we didn't have money to get more. Never  Bhavya Llanos LPN 7/13/2021 3:43 PM      Bhavya Llanos LPN  "

## 2021-07-13 NOTE — PROGRESS NOTES
Assessment & Plan   Problem List Items Addressed This Visit        Endocrine    Hypothyroidism - Primary    Relevant Medications    levothyroxine (SYNTHROID/LEVOTHROID) 112 MCG tablet    Other Relevant Orders    TSH      Other Visit Diagnoses     Colon cancer screening        Relevant Orders    JUVENTINO([employer]) (Completed)    Encounter for screening mammogram for breast cancer        Relevant Orders    MA Screen Bilateral w/Lb         Results for orders placed or performed in visit on 07/13/21   TSH     Status: Normal   Result Value Ref Range    TSH 3.92 0.40 - 4.00 mU/L   Extra Purple Top Tube     Status: None   Result Value Ref Range    Hold Specimen JIC    Extra Tube     Status: None    Narrative    The following orders were created for panel order Extra Tube.  Procedure                               Abnormality         Status                     ---------                               -----------         ------                     Extra Purple Top Tube[[id number]]                            Final result                 Please view results for these tests on the individual orders.                Tobacco Cessation:   reports that she has been smoking cigarettes. She has been smoking about 0.50 packs per day. She has never used smokeless tobacco.  Tobacco Cessation Action Plan: Information offered: Patient not interested at this time        Return in about 1 year (around 7/13/2022).    Jerardo Navarrete MD  [facility]    Fabiola Nobles is a 45 year old who presents for the following health issues     HPI follow up on thyroid.  Was last seen by me 2 years ago.very good at taking her synthroid.  No side effects.  No cold intolerance, no weight changes, no diarrhea or constipation.  No palpitations.  Has lost weight, with a keto diet.  Down 26# or more.          Review of Systems         Objective    /64   Pulse 73   Temp 97.1  F (36.2  C)   Resp 16   Wt 59.7 kg (131 lb  9.6 oz)   SpO2 96%   BMI 24.07 kg/m    Body mass index is 24.07 kg/m .  Physical Exam  Constitutional:       Appearance: Normal appearance.   Musculoskeletal:      Cervical back: Neck supple. No tenderness.   Neurological:      Mental Status: She is alert.   Psychiatric:         Mood and Affect: Mood normal.         Behavior: Behavior normal.         Thought Content: Thought content normal.

## 2021-07-13 NOTE — LETTER
July 14, 2021      Mallorie Donglouie  [address]  [place] [address]        Dear ,    We are writing to inform you of your test results.    Your test results fall within the expected range(s) or remain unchanged from previous results.  Please continue with current treatment plan.    Resulted Orders   TSH   Result Value Ref Range    TSH 3.92 0.40 - 4.00 mU/L   Extra Purple Top Tube   Result Value Ref Range    Hold Specimen JIC        If you have any questions or concerns, please call the clinic at the number listed above.       Sincerely,      Jerardo Navarrete MD

## 2021-07-14 ASSESSMENT — ANXIETY QUESTIONNAIRES: GAD7 TOTAL SCORE: 0

## 2021-07-14 ASSESSMENT — ASTHMA QUESTIONNAIRES: ACT_TOTALSCORE: 24

## 2021-12-06 ENCOUNTER — HOSPITAL ENCOUNTER (EMERGENCY)
Facility: OTHER | Age: 46
Discharge: HOME | End: 2021-12-06
Attending: EMERGENCY MEDICINE | Admitting: EMERGENCY MEDICINE
Payer: OTHER GOVERNMENT

## 2021-12-06 VITALS
RESPIRATION RATE: 12 BRPM | HEART RATE: 55 BPM | OXYGEN SATURATION: 97 % | WEIGHT: 131 LBS | BODY MASS INDEX: 24.11 KG/M2 | TEMPERATURE: 98 F | SYSTOLIC BLOOD PRESSURE: 144 MMHG | HEIGHT: 62 IN | DIASTOLIC BLOOD PRESSURE: 85 MMHG

## 2021-12-06 DIAGNOSIS — U07.1 INFECTION DUE TO 2019 NOVEL CORONAVIRUS: ICD-10-CM

## 2021-12-06 LAB
ALBUMIN SERPL-MCNC: 4.4 G/DL (ref 3.5–5.7)
ALP SERPL-CCNC: 39 U/L (ref 34–104)
ALT SERPL W P-5'-P-CCNC: 14 U/L (ref 7–52)
ANION GAP SERPL CALCULATED.3IONS-SCNC: 8 MMOL/L (ref 3–14)
AST SERPL W P-5'-P-CCNC: 14 U/L (ref 13–39)
BASOPHILS # BLD AUTO: 0 10E3/UL (ref 0–0.2)
BASOPHILS NFR BLD AUTO: 1 %
BILIRUB SERPL-MCNC: 0.4 MG/DL (ref 0.3–1)
BUN SERPL-MCNC: 19 MG/DL (ref 7–25)
CALCIUM SERPL-MCNC: 9.2 MG/DL (ref 8.6–10.3)
CHLORIDE BLD-SCNC: 104 MMOL/L (ref 98–107)
CO2 SERPL-SCNC: 26 MMOL/L (ref 21–31)
CREAT SERPL-MCNC: 0.65 MG/DL (ref 0.6–1.2)
EOSINOPHIL # BLD AUTO: 0.3 10E3/UL (ref 0–0.7)
EOSINOPHIL NFR BLD AUTO: 6 %
ERYTHROCYTE [DISTWIDTH] IN BLOOD BY AUTOMATED COUNT: 12.4 % (ref 10–15)
FLUAV RNA SPEC QL NAA+PROBE: NEGATIVE
FLUBV RNA RESP QL NAA+PROBE: NEGATIVE
GFR SERPL CREATININE-BSD FRML MDRD: >90 ML/MIN/1.73M2
GLUCOSE BLD-MCNC: 114 MG/DL (ref 70–105)
HCT VFR BLD AUTO: 48.3 % (ref 35–47)
HGB BLD-MCNC: 16.6 G/DL (ref 11.7–15.7)
IMM GRANULOCYTES # BLD: 0 10E3/UL
IMM GRANULOCYTES NFR BLD: 0 %
LYMPHOCYTES # BLD AUTO: 1.6 10E3/UL (ref 0.8–5.3)
LYMPHOCYTES NFR BLD AUTO: 34 %
MCH RBC QN AUTO: 30.6 PG (ref 26.5–33)
MCHC RBC AUTO-ENTMCNC: 34.4 G/DL (ref 31.5–36.5)
MCV RBC AUTO: 89 FL (ref 78–100)
MONOCYTES # BLD AUTO: 0.4 10E3/UL (ref 0–1.3)
MONOCYTES NFR BLD AUTO: 9 %
NEUTROPHILS # BLD AUTO: 2.3 10E3/UL (ref 1.6–8.3)
NEUTROPHILS NFR BLD AUTO: 50 %
NRBC # BLD AUTO: 0 10E3/UL
NRBC BLD AUTO-RTO: 0 /100
PLATELET # BLD AUTO: 175 10E3/UL (ref 150–450)
POTASSIUM BLD-SCNC: 4.1 MMOL/L (ref 3.5–5.1)
PROT SERPL-MCNC: 7.4 G/DL (ref 6.4–8.9)
RBC # BLD AUTO: 5.42 10E6/UL (ref 3.8–5.2)
RSV RNA SPEC NAA+PROBE: NEGATIVE
SARS-COV-2 RNA RESP QL NAA+PROBE: POSITIVE
SODIUM SERPL-SCNC: 138 MMOL/L (ref 134–144)
TROPONIN I SERPL-MCNC: <2.4 PG/ML (ref 0–34)
WBC # BLD AUTO: 4.7 10E3/UL (ref 4–11)

## 2021-12-06 PROCEDURE — 93005 ELECTROCARDIOGRAM TRACING: CPT | Performed by: PHYSICIAN ASSISTANT

## 2021-12-06 PROCEDURE — 36415 COLL VENOUS BLD VENIPUNCTURE: CPT | Performed by: PHYSICIAN ASSISTANT

## 2021-12-06 PROCEDURE — 99284 EMERGENCY DEPT VISIT MOD MDM: CPT | Performed by: PHYSICIAN ASSISTANT

## 2021-12-06 PROCEDURE — 84484 ASSAY OF TROPONIN QUANT: CPT | Performed by: PHYSICIAN ASSISTANT

## 2021-12-06 PROCEDURE — C9803 HOPD COVID-19 SPEC COLLECT: HCPCS | Performed by: PHYSICIAN ASSISTANT

## 2021-12-06 PROCEDURE — 80053 COMPREHEN METABOLIC PANEL: CPT | Performed by: PHYSICIAN ASSISTANT

## 2021-12-06 PROCEDURE — 85025 COMPLETE CBC W/AUTO DIFF WBC: CPT | Performed by: PHYSICIAN ASSISTANT

## 2021-12-06 PROCEDURE — 99284 EMERGENCY DEPT VISIT MOD MDM: CPT | Mod: 25 | Performed by: PHYSICIAN ASSISTANT

## 2021-12-06 PROCEDURE — 87637 SARSCOV2&INF A&B&RSV AMP PRB: CPT | Performed by: PHYSICIAN ASSISTANT

## 2021-12-06 PROCEDURE — 93010 ELECTROCARDIOGRAM REPORT: CPT | Performed by: INTERNAL MEDICINE

## 2021-12-06 ASSESSMENT — ENCOUNTER SYMPTOMS
BACK PAIN: 0
FEVER: 0
CHEST TIGHTNESS: 0
CHILLS: 0
CONFUSION: 0
SHORTNESS OF BREATH: 0
HEMATURIA: 0
DIZZINESS: 1
ABDOMINAL PAIN: 0
WOUND: 0
BRUISES/BLEEDS EASILY: 0
NUMBNESS: 1

## 2021-12-06 ASSESSMENT — MIFFLIN-ST. JEOR: SCORE: 1187.46

## 2021-12-06 NOTE — DISCHARGE INSTRUCTIONS
Get plenty of fluids and rest.  As we discussed, you are positive for Covid.  The remainder of your lab work looked well your vital signs appear excellent at this time.  Expect your symptoms to improve over 10 to 14 days, please self quarantine.  If you develop fevers you can alternate Tylenol and ibuprofen if you become very short of breath please return for further evaluation otherwise follow-up with PCP as needed.

## 2021-12-06 NOTE — ED TRIAGE NOTES
"Pt here with family with c/o not feeling well since Saturday and symptoms getting worse with dizziness, overall numbness and weakness with intermittent chest pain, pt appears anxious, VSS, pt brought back into ER to be evaluated  ED Nursing Triage Note (General)   ________________________________    Mallorie Machado is a 46 year old Female that presents to triage private car  With history of  dizziness reported by patient   Significant symptoms had onset 2 day(s) ago.  BP (!) 171/102   Pulse 66   Temp 98  F (36.7  C) (Tympanic)   Resp 18   Ht 1.575 m (5' 2\")   Wt 59.4 kg (131 lb)   SpO2 98%   BMI 23.96 kg/m  t  Patient appears alert  and oriented, in no acute distress, but was mildly anxious., and cooperative and pleasant behavior.    GCS Total = 15  Airway: intact  Breathing noted as Normal  Circulation Normal  Skin:  Normal  Action taken:  Triage to critical care immediately      PRE HOSPITAL PRIOR LIVING SITUATION Spouse    "

## 2021-12-06 NOTE — LETTER
December 6, 2021      To Whom It May Concern:      Mallorie Machado was seen in our Emergency Department today, 12/06/21.  I expect her condition to improve over the next 10-14 days. She is positive for COVID. I do recommend that her , Jimenez, self quarantine for 10 days as well.       Sincerely,        NADEEM Garza

## 2021-12-08 LAB
ATRIAL RATE - MUSE: 72 BPM
DIASTOLIC BLOOD PRESSURE - MUSE: NORMAL MMHG
INTERPRETATION ECG - MUSE: NORMAL
P AXIS - MUSE: 55 DEGREES
PR INTERVAL - MUSE: 112 MS
QRS DURATION - MUSE: 90 MS
QT - MUSE: 408 MS
QTC - MUSE: 446 MS
R AXIS - MUSE: -34 DEGREES
SYSTOLIC BLOOD PRESSURE - MUSE: NORMAL MMHG
T AXIS - MUSE: 57 DEGREES
VENTRICULAR RATE- MUSE: 72 BPM

## 2022-04-06 ENCOUNTER — OFFICE VISIT (OUTPATIENT)
Dept: FAMILY MEDICINE | Facility: OTHER | Age: 47
End: 2022-04-06
Attending: FAMILY MEDICINE
Payer: COMMERCIAL

## 2022-04-06 VITALS
RESPIRATION RATE: 12 BRPM | TEMPERATURE: 97.6 F | SYSTOLIC BLOOD PRESSURE: 130 MMHG | BODY MASS INDEX: 25.51 KG/M2 | HEART RATE: 68 BPM | OXYGEN SATURATION: 95 % | WEIGHT: 139.5 LBS | DIASTOLIC BLOOD PRESSURE: 78 MMHG

## 2022-04-06 DIAGNOSIS — J01.90 ACUTE SINUSITIS WITH SYMPTOMS > 10 DAYS: Primary | ICD-10-CM

## 2022-04-06 DIAGNOSIS — E03.9 HYPOTHYROIDISM, UNSPECIFIED TYPE: ICD-10-CM

## 2022-04-06 PROCEDURE — 99213 OFFICE O/P EST LOW 20 MIN: CPT | Performed by: PHYSICIAN ASSISTANT

## 2022-04-06 RX ORDER — AZITHROMYCIN 250 MG/1
TABLET, FILM COATED ORAL
Qty: 6 TABLET | Refills: 0 | Status: SHIPPED | OUTPATIENT
Start: 2022-04-06 | End: 2022-04-11

## 2022-04-06 ASSESSMENT — PAIN SCALES - GENERAL: PAINLEVEL: MILD PAIN (2)

## 2022-04-06 NOTE — PATIENT INSTRUCTIONS
Please refer to your AVS for follow up and pain/symptoms management recommendations (I.e.: medications, helpful conservative treatment modalities, appropriate follow up if need to a specialist or family practice, etc.). Please return to urgent care if your symptoms change or worsen.     Discharge instructions:  -If you were prescribed a medication(s), please take this as prescribed/directed  -Monitor your symptoms, if changing/worsening, return to UC/ER or PCP for follow up    Sinus Infection:   1. Dry out congestion with flonase (1spray in both nostrils 2x daily for 3-5 days) and pseudoephedrine (1-2 tabs every 4-6 hrs for 3-5 days) unless contraindicated     2. Use a saline spray/Neti Pot/sinus flush ([facility] Med Sinus Rinse) 2-3 times daily to irrigate sinuses/mucosal tissue. This dilutes and moves secretions.     3. Tylenol or ibuprofen for pain and fevers - alternate every 4 hours as needed. I.e.: Ibuprofen at 8am, Tylenol 12pm, Ibuprofen 4pm    -Daily maximum of Tylenol is 4000mg (recommend staying under 3000mg)   -Daily maximum of Ibuprofen is 1200mg (take no more than six 200mg pills a day)    4. Plenty of fluids and rest as needed.     5. Chew, yawn and speak to help eustachian tubes drain.     * If you are a smoker, try to quit *     - Consider the following over-the-counter products if you are older than 1 year and not pregnant: honey/chestal for cough relief and sambucus/elderberry for viral upper-respiratory symptoms.    You were prescribed an antibiotic, please take into consideration the following information:  - Take entire course of antibiotic even if you start to feel better.  - Antibiotics can cause stomach upset including nausea and diarrhea. Read your bottle or ask the pharmacist if antibiotic can be taken with food to help prevent nausea. If you have symptoms of diarrhea you can take an over-the-counter probiotic and/or increase foods with probiotics such as yogurt, [place], sauerkraut.  -Use  caution in sunlight as can lead to increased risk of sunburn while on ABX (antibiotics).

## 2022-04-06 NOTE — NURSING NOTE
"Chief Complaint   Patient presents with     Sinus Problem     Patient presents to clinic for possible sinus problem. Patient stated she has sinus pressure, her jaw and teeth hurt, has drainage going down the back of her throat, and her right ear hurts. Has been ill for 1-1.5 weeks.    Initial /78 (BP Location: Right arm, Patient Position: Sitting, Cuff Size: Adult Regular)   Pulse 68   Temp 97.6  F (36.4  C) (Tympanic)   Resp 12   Wt 63.3 kg (139 lb 8 oz)   SpO2 95%   Breastfeeding No   BMI 25.51 kg/m   Estimated body mass index is 25.51 kg/m  as calculated from the following:    Height as of 12/6/21: 1.575 m (5' 2\").    Weight as of this encounter: 63.3 kg (139 lb 8 oz).  Medication Reconciliation: Completed     Advanced Care Directive Reviewed    Claudio Garcia LPN  "

## 2022-04-06 NOTE — PROGRESS NOTES
ASSESSMENT/PLAN:    I have reviewed the nursing notes.  I have reviewed the findings, diagnosis, plan and need for follow up with the patient.    1. Acute sinusitis with symptoms > 10 days  - azithromycin (ZITHROMAX) 250 MG tablet; Take 2 tablets (500 mg) by mouth daily for 1 day, THEN 1 tablet (250 mg) daily for 4 days.  Dispense: 6 tablet; Refill: 0  - Vital signs stable. PE consistent with sinusitis. Discussed with patient that we recommend: to dry out congestion with flonase (1spray in both nostrils 2x daily for 3-5 days) and pseudoephedrine (1-2 tabs every 4-6 hrs for 3-5 days) unless contraindicated, use a saline spray/Neti Pot/sinus flush (Duncan Med Sinus Rinse) 2-3 times daily to irrigate sinuses/mucosal tissue. This dilutes and moves secretions. Alternate Tylenol or ibuprofen for pain and fevers - alternate every 4 hours as needed. Recommend plenty of fluids and rest as needed. Discussed that if a smoker, tobacco cessation recommended. Discussed that we normally do not treat sinus infections of less than 10 days duration with oral antibiotics as these are typically viral in nature. Discussed that if an antibiotic was prescribed today for bacterial sinusitis to take the entire course of antibiotic, discussed side effect profile of prescribed medications. Patient is in agreement and understanding of the above treatment plan. All questions and concerns were addressed and answered to patient's satisfaction. AVS reviewed with patient.     2. Hypothyroidism  - Current on synthroid 112 mcg. Last TSH 07/2021 at 3.92. No side effects. No GI upset. No recent weight loss/gain substantially. No change to hair growth. Feels stable. Due for refill in 1 month. Assisted to set up visit with PCP in 3.5 weeks.     Discussed warning signs/symptoms indicative of need to f/u    Follow up if symptoms persist or worsen or concerns    I explained my diagnostic considerations and recommendations to the patient, who voiced  understanding and agreement with the treatment plan. All questions were answered. We discussed potential side effects of any prescribed or recommended therapies, as well as expectations for response to treatments.    Melinda Adams PA-C  4/6/2022  3:59 PM    HPI:    Mallorie Machado is a 46 year old female  who presents to [facility] today for concerns of sinus infection x 1.5 weeks.     Symptoms:   Location: right maxillary, right frontal  Nasal congestion: Yes  Purulent nasal discharge: Yes  Headache: Yes  Facial pain: Yes   Cough: Yes  Tooth pain/symptoms: Yes  Myalgias: Yes  Presence of fever: No   Halitosis: No   Anosmia: No    Metallic taste in the mouth: No     Treatments tried: OTC meds with minimal improvement.     History of similar symptoms: Yes  Prior workup: No    Due for physical with PCP - will be out of synthroid in 1 month. No concerns with weight, GI upset, no side effects.     PCP: MD Landon    Past Medical History:   Diagnosis Date     Allergic rhinitis due to pollen     No Comments Provided     Cyst of ovary     1994     Dermatitis     scalp     Dysmenorrhea     No Comments Provided     Dysthymic disorder     No Comments Provided     Endometriosis     No Comments Provided     Hypothyroidism     No Comments Provided     Injury of lower back     02/07,Cervical disc injury, C5-6, with improvement (work comp related)     Low back pain     No Comments Provided     Obesity     No Comments Provided     Other cervical disc degeneration, unspecified cervical region     No Comments Provided     Other cervical disc displacement, unspecified cervical region     No Comments Provided     Personal history of other diseases of the female genital tract     G2, P2-0-0-2, vaginal deliveries     Uncomplicated asthma     No Comments Provided     Past Surgical History:   Procedure Laterality Date     ATTEMPTED ARTHROSCOPY      1/20/06,left wrist arthroscopy with debridement - Dr. Mahmood, [facility]     FUSION  CERVICAL ANTERIOR ONE LEVEL      2008,C5-6     HYSTERECTOMY TOTAL ABDOMINAL, BILATERAL SALPINGO-OOPHORECTOMY, COMBINED      11/04,bilateral salpingo-oophorectomy     LAPAROSCOPY DIAGNOSTIC (GENERAL)      6/04,02/07     OTHER SURGICAL HISTORY       x 2,NGB297,VAGINAL DELIVERY     OTHER SURGICAL HISTORY      1/19/2012,205448,REMOVAL OF FOREIGN BODY,ACDF C4-5 and C6-7 with hardware removal [Other][[[     Social History     Tobacco Use     Smoking status: Current Every Day Smoker     Packs/day: 0.50     Types: Cigarettes     Smokeless tobacco: Never Used   Substance Use Topics     Alcohol use: Not Currently     Alcohol/week: 0.0 standard drinks     Current Outpatient Medications   Medication Sig Dispense Refill     levothyroxine (SYNTHROID/LEVOTHROID) 112 MCG tablet Take 1 tablet (112 mcg) by mouth daily 90 tablet 4     Allergies   Allergen Reactions     Hydrocodone-Acetaminophen Itching and Nausea     Meperidine Itching     No hives     Morphine      Other reaction(s): Erythema  Face & hands     Oxycodone-Acetaminophen Itching     Propoxyphene N-Apap      Other reaction(s): Throat Swelling/Closing     Past medical history, past surgical history, current medications and allergies reviewed and accurate to the best of my knowledge.      ROS:  Refer to HPI    /78 (BP Location: Right arm, Patient Position: Sitting, Cuff Size: Adult Regular)   Pulse 68   Temp 97.6  F (36.4  C) (Tympanic)   Resp 12   Wt 63.3 kg (139 lb 8 oz)   SpO2 95%   Breastfeeding No   BMI 25.51 kg/m       EXAM:  General Appearance: Well appearing 46 year old female, appropriate appearance for age. No acute distress   Ears: Left TM intact, translucent with bony landmarks appreciated, no erythema, no effusion, no bulging, no purulence.  Right TM intact, translucent with bony landmarks appreciated, no erythema, no effusion, no bulging, no purulence.  Left auditory canal clear.  Right auditory canal clear.  Normal external ears, non  tender.  Eyes: conjunctivae normal without erythema or irritation, corneas clear, no drainage or crusting, no eyelid swelling, pupils equal   Oropharynx: moist mucous membranes, posterior pharynx without erythema, tonsils symmetric, no erythema, no exudates or petechiae, + post nasal drip seen, no trismus, voice clear.    Sinuses:  Right maxillary and frontal sinus pressure, normal left frontal and maxillary sinus examination.  Nose:  Bilateral nares: no erythema, no edema, + purulent drainage and congestion.    Neck: supple without adenopathy  Respiratory: normal chest wall and respirations.  Normal effort.  Clear to auscultation bilaterally, no wheezing, crackles or rhonchi.  No increased work of breathing.  No cough appreciated.  Cardiac: RRR with no murmurs  Dermatological: no rashes noted of exposed skin  Psychological: normal affect, alert, oriented, and pleasant.     Labs:  None     Xray:  None

## 2022-05-04 ENCOUNTER — HOSPITAL ENCOUNTER (OUTPATIENT)
Dept: MAMMOGRAPHY | Facility: OTHER | Age: 47
Discharge: HOME | End: 2022-05-04
Attending: FAMILY MEDICINE
Payer: COMMERCIAL

## 2022-05-04 ENCOUNTER — OFFICE VISIT (OUTPATIENT)
Dept: FAMILY MEDICINE | Facility: OTHER | Age: 47
End: 2022-05-04
Attending: FAMILY MEDICINE
Payer: COMMERCIAL

## 2022-05-04 VITALS
SYSTOLIC BLOOD PRESSURE: 128 MMHG | BODY MASS INDEX: 23.6 KG/M2 | HEART RATE: 66 BPM | TEMPERATURE: 98.2 F | DIASTOLIC BLOOD PRESSURE: 72 MMHG | WEIGHT: 138.2 LBS | OXYGEN SATURATION: 97 % | RESPIRATION RATE: 16 BRPM | HEIGHT: 64 IN

## 2022-05-04 DIAGNOSIS — Z12.31 ENCOUNTER FOR SCREENING MAMMOGRAM FOR BREAST CANCER: ICD-10-CM

## 2022-05-04 DIAGNOSIS — E03.9 HYPOTHYROIDISM, UNSPECIFIED TYPE: ICD-10-CM

## 2022-05-04 DIAGNOSIS — Z00.00 ROUTINE GENERAL MEDICAL EXAMINATION AT A HEALTH CARE FACILITY: Primary | ICD-10-CM

## 2022-05-04 LAB
CHOLEST SERPL-MCNC: 248 MG/DL
FASTING STATUS PATIENT QL REPORTED: NO
HDLC SERPL-MCNC: 59 MG/DL (ref 23–92)
LDLC SERPL CALC-MCNC: 165 MG/DL
NONHDLC SERPL-MCNC: 189 MG/DL
TRIGL SERPL-MCNC: 122 MG/DL
TSH SERPL DL<=0.005 MIU/L-ACNC: 6.46 MU/L (ref 0.4–4)

## 2022-05-04 PROCEDURE — 87624 HPV HI-RISK TYP POOLED RSLT: CPT | Mod: ZL | Performed by: FAMILY MEDICINE

## 2022-05-04 PROCEDURE — 84443 ASSAY THYROID STIM HORMONE: CPT | Mod: ZL | Performed by: FAMILY MEDICINE

## 2022-05-04 PROCEDURE — 80061 LIPID PANEL: CPT | Mod: ZL | Performed by: FAMILY MEDICINE

## 2022-05-04 PROCEDURE — 77067 SCR MAMMO BI INCL CAD: CPT

## 2022-05-04 PROCEDURE — 99396 PREV VISIT EST AGE 40-64: CPT | Performed by: FAMILY MEDICINE

## 2022-05-04 PROCEDURE — 36415 COLL VENOUS BLD VENIPUNCTURE: CPT | Mod: ZL | Performed by: FAMILY MEDICINE

## 2022-05-04 PROCEDURE — G0123 SCREEN CERV/VAG THIN LAYER: HCPCS | Performed by: FAMILY MEDICINE

## 2022-05-04 RX ORDER — LEVOTHYROXINE SODIUM 125 UG/1
125 TABLET ORAL DAILY
Qty: 90 TABLET | Refills: 4 | Status: SHIPPED | OUTPATIENT
Start: 2022-05-04 | End: 2023-04-24

## 2022-05-04 RX ORDER — LEVOTHYROXINE SODIUM 112 UG/1
112 TABLET ORAL DAILY
Qty: 90 TABLET | Refills: 4 | Status: SHIPPED | OUTPATIENT
Start: 2022-05-04 | End: 2022-05-04

## 2022-05-04 ASSESSMENT — PAIN SCALES - GENERAL: PAINLEVEL: NO PAIN (0)

## 2022-05-04 NOTE — LETTER
May 4, 2022      Mallorie Machado  [address] [place] [address]        Dear ,    We are writing to inform you of your test results.    You need a little more thyroid medication, like you expected. I faxed it in and repeat the labs in 8-12 weeks again.    Resulted Orders   TSH   Result Value Ref Range    TSH 6.46 (H) 0.40 - 4.00 mU/L   Lipid Profile   Result Value Ref Range    Cholesterol 248 (H) <200 mg/dL    Triglycerides 122 <150 mg/dL    Direct Measure HDL 59 23 - 92 mg/dL    LDL Cholesterol Calculated 165 (H) <=100 mg/dL    Non HDL Cholesterol 189 (H) <130 mg/dL    Patient Fasting > 8hrs? No     Narrative    Cholesterol  Desirable:  <200 mg/dL    Triglycerides  Normal:  Less than 150 mg/dL  Borderline High:  150-199 mg/dL  High:  200-499 mg/dL  Very High:  Greater than or equal to 500 mg/dL    Direct Measure HDL  Female:  Greater than or equal to 50 mg/dL   Male:  Greater than or equal to 40 mg/dL    LDL Cholesterol  Desirable:  <100mg/dL  Above Desirable:  100-129 mg/dL   Borderline High:  130-159 mg/dL   High:  160-189 mg/dL   Very High:  >= 190 mg/dL    Non HDL Cholesterol  Desirable:  130 mg/dL  Above Desirable:  130-159 mg/dL  Borderline High:  160-189 mg/dL  High:  190-219 mg/dL  Very High:  Greater than or equal to 220 mg/dL       If you have any questions or concerns, please call the clinic at the number listed above.       Sincerely,      Jerardo Navarrete MD

## 2022-05-04 NOTE — LETTER
May 11, 2022      Mallorie L Jeannette  [address]  [place] [address]        Dear ,    We are writing to inform you of your test results.    Your test results fall within the expected range(s) or remain unchanged from previous results.  Please continue with current treatment plan.    Resulted Orders   PAP screen with HPV - recommended age 30 - 65 years   Result Value Ref Range    Interpretation        Negative for Intraepithelial Lesion or Malignancy (NILM)    Comment         Papanicolaou Test Limitations:  Cervical cytology is a screening test with limited sensitivity, and regular screening is critical for cancer prevention.  Pap tests are primarily effective for the diagnosis/prevention of squamous cell carcinoma, not adenocarcinoma or other cancers.        Specimen Adequacy       Satisfactory for evaluation, endocervical/transformation zone component present    Clinical Information       none      Reflex Testing Yes regardless of result     Previous Abnormal?       No      Performing Labs       The technical component of this testing was completed at [facility]     TSH   Result Value Ref Range    TSH 6.46 (H) 0.40 - 4.00 mU/L   Lipid Profile   Result Value Ref Range    Cholesterol 248 (H) <200 mg/dL    Triglycerides 122 <150 mg/dL    Direct Measure HDL 59 23 - 92 mg/dL    LDL Cholesterol Calculated 165 (H) <=100 mg/dL    Non HDL Cholesterol 189 (H) <130 mg/dL    Patient Fasting > 8hrs? No     Narrative    Cholesterol  Desirable:  <200 mg/dL    Triglycerides  Normal:  Less than 150 mg/dL  Borderline High:  150-199 mg/dL  High:  200-499 mg/dL  Very High:  Greater than or equal to 500 mg/dL    Direct Measure HDL  Female:  Greater than or equal to 50 mg/dL   Male:  Greater than or equal to 40 mg/dL    LDL Cholesterol  Desirable:  <100mg/dL  Above Desirable:  100-129 mg/dL   Borderline High:  130-159 mg/dL   High:  160-189 mg/dL   Very High:  >= 190 mg/dL    Non HDL  Cholesterol  Desirable:  130 mg/dL  Above Desirable:  130-159 mg/dL  Borderline High:  160-189 mg/dL  High:  190-219 mg/dL  Very High:  Greater than or equal to 220 mg/dL       If you have any questions or concerns, please call the clinic at the number listed above.       Sincerely,      Jerardo Navarrete MD

## 2022-05-04 NOTE — NURSING NOTE
"Chief Complaint   Patient presents with     Physical     Check up       Initial /72   Pulse 66   Temp 98.2  F (36.8  C)   Resp 16   Ht 1.613 m (5' 3.5\")   Wt 62.7 kg (138 lb 3.2 oz)   SpO2 97%   BMI 24.10 kg/m   Estimated body mass index is 24.1 kg/m  as calculated from the following:    Height as of this encounter: 1.613 m (5' 3.5\").    Weight as of this encounter: 62.7 kg (138 lb 3.2 oz).  Medication Reconciliation: complete.  FOOD SECURITY SCREENING QUESTIONS  Hunger Vital Signs:  Within the past 12 months we worried whether our food would run out before we got money to buy more. Never  Within the past 12 months the food we bought just didn't last and we didn't have money to get more. Never  Bhavya Llanos LPN 5/4/2022 11:18 AM      Bhavya Llanos LPN  "

## 2022-05-04 NOTE — LETTER
May 13, 2022      Mallorie L Jeannette  [address]  [place] [address]        Dear ,    We are writing to inform you of your test results.    Your test results fall within the expected range(s) or remain unchanged from previous results.  Please continue with current treatment plan.    Resulted Orders   PAP screen with HPV - recommended age 30 - 65 years   Result Value Ref Range    Interpretation        Negative for Intraepithelial Lesion or Malignancy (NILM)    Comment         Papanicolaou Test Limitations:  Cervical cytology is a screening test with limited sensitivity, and regular screening is critical for cancer prevention.  Pap tests are primarily effective for the diagnosis/prevention of squamous cell carcinoma, not adenocarcinoma or other cancers.        Specimen Adequacy       Satisfactory for evaluation, endocervical/transformation zone component present    Clinical Information       none      Reflex Testing Yes regardless of result     Previous Abnormal?       No      Performing Labs       The technical component of this testing was completed at [facility]     TSH   Result Value Ref Range    TSH 6.46 (H) 0.40 - 4.00 mU/L   Lipid Profile   Result Value Ref Range    Cholesterol 248 (H) <200 mg/dL    Triglycerides 122 <150 mg/dL    Direct Measure HDL 59 23 - 92 mg/dL    LDL Cholesterol Calculated 165 (H) <=100 mg/dL    Non HDL Cholesterol 189 (H) <130 mg/dL    Patient Fasting > 8hrs? No     Narrative    Cholesterol  Desirable:  <200 mg/dL    Triglycerides  Normal:  Less than 150 mg/dL  Borderline High:  150-199 mg/dL  High:  200-499 mg/dL  Very High:  Greater than or equal to 500 mg/dL    Direct Measure HDL  Female:  Greater than or equal to 50 mg/dL   Male:  Greater than or equal to 40 mg/dL    LDL Cholesterol  Desirable:  <100mg/dL  Above Desirable:  100-129 mg/dL   Borderline High:  130-159 mg/dL   High:  160-189 mg/dL   Very High:  >= 190 mg/dL    Non HDL  Cholesterol  Desirable:  130 mg/dL  Above Desirable:  130-159 mg/dL  Borderline High:  160-189 mg/dL  High:  190-219 mg/dL  Very High:  Greater than or equal to 220 mg/dL   HPV High Risk Types DNA Cervical   Result Value Ref Range    Other HR HPV Negative Negative    HPV16 DNA Negative Negative    HPV18 DNA Negative Negative    FINAL DIAGNOSIS       This patient's sample is negative for HPV DNA.          METHODOLOGY: The Roche Hasmukh 4800 system uses automated extraction, simultaneous amplification of HPV (L1 region) and beta-globin, followed by real time detection of fluorescent labeled HPV and beta globin using specific oligonucleotide probes. The test specifically identified types HPV 16 DNA and HPV 18 DNA while concurrently detecting the rest of the high risk types (31, 33, 35, 39, 45, 51, 52, 56, 58, 59, 66 or 68).    COMMENTS: This test is not intended for use as a screening device for woman under age 30 with normal cervical cytology. Results should be correlated with cytologic and histologic findings. Close clinical followup is recommended.           If you have any questions or concerns, please call the clinic at the number listed above.       Sincerely,      Jerardo Navarrete MD

## 2022-05-04 NOTE — PROGRESS NOTES
SUBJECTIVE:   CC: Mallorie Machado is an 46 year old woman who presents for preventive health visit.       Patient has been advised of split billing requirements and indicates understanding: No  Healthy Habits:     Getting at least 3 servings of Calcium per day:  Yes    Bi-annual eye exam:  NO    Dental care twice a year:  NO    Sleep apnea or symptoms of sleep apnea:  None    Diet:  Other    Frequency of exercise:  2-3 days/week    Duration of exercise:  15-30 minutes    Taking medications regularly:  Yes    Medication side effects:  None    PHQ-2 Total Score: 0    Additional concerns today:  No              Today's PHQ-2 Score:   PHQ-2 ( 1999 [employer]) 5/4/2022   Q1: Little interest or pleasure in doing things 0   Q2: Feeling down, depressed or hopeless 0   PHQ-2 Score 0   PHQ-2 Total Score (12-17 Years)- Positive if 3 or more points; Administer PHQ-A if positive -   Q1: Little interest or pleasure in doing things Not at all   Q2: Feeling down, depressed or hopeless Not at all   PHQ-2 Score 0       Abuse: Current or Past (Physical, Sexual or Emotional) - No  Do you feel safe in your environment? Yes    Have you ever done Advance Care Planning? (For example, a Health Directive, POLST, or a discussion with a medical provider or your loved ones about your wishes): Yes, advance care planning is on file.    Social History     Tobacco Use     Smoking status: Current Every Day Smoker     Packs/day: 0.50     Types: Cigarettes     Smokeless tobacco: Never Used   Substance Use Topics     Alcohol use: Not Currently     Alcohol/week: 0.0 standard drinks     If you drink alcohol do you typically have >3 drinks per day or >7 drinks per week? No    Alcohol Use 5/4/2022   Prescreen: >3 drinks/day or >7 drinks/week? No       Reviewed orders with patient.  Reviewed health maintenance and updated orders accordingly - Yes  Lab work is in process  Labs reviewed in [facility]  Current Outpatient Medications   Medication Sig Dispense Refill      levothyroxine (SYNTHROID/LEVOTHROID) 112 MCG tablet Take 1 tablet (112 mcg) by mouth daily 90 tablet 4     Allergies   Allergen Reactions     Amoxil [Amoxicillin] Other (See Comments), Nausea and Vomiting and GI Disturbance     Lethargic     Hydrocodone-Acetaminophen Itching and Nausea     Meperidine Itching     No hives     Morphine      Other reaction(s): Erythema  Face & hands     Oxycodone-Acetaminophen Itching     Propoxyphene N-Apap      Other reaction(s): Throat Swelling/Closing       Breast Cancer Screening:  Any new diagnosis of family breast, ovarian, or bowel cancer? No    FHS-7: No flowsheet data found.    Mammogram Screening: Recommended annual mammography  Pertinent mammograms are reviewed under the imaging tab.    History of abnormal Pap smear: NO - age 30-65 PAP every 5 years with negative HPV co-testing recommended     Reviewed and updated as needed this visit by clinical staff   Tobacco  Allergies  Meds   Med Hx    Soc Hx          Reviewed and updated as needed this visit by Provider                   Past Medical History:   Diagnosis Date     Allergic rhinitis due to pollen     No Comments Provided     Cyst of ovary     1994     Dermatitis     scalp     Dysmenorrhea     No Comments Provided     Dysthymic disorder     No Comments Provided     Endometriosis     No Comments Provided     Hypothyroidism     No Comments Provided     Injury of lower back     02/07,Cervical disc injury, C5-6, with improvement (work comp related)     Low back pain     No Comments Provided     Obesity     No Comments Provided     Other cervical disc degeneration, unspecified cervical region     No Comments Provided     Other cervical disc displacement, unspecified cervical region     No Comments Provided     Personal history of other diseases of the female genital tract     G2, P2-0-0-2, vaginal deliveries     Uncomplicated asthma     No Comments Provided      Past Surgical History:   Procedure Laterality Date      "ATTEMPTED ARTHROSCOPY      1/20/06,left wrist arthroscopy with debridement - Dr. Mahmood, [facility]     FUSION CERVICAL ANTERIOR ONE LEVEL      2008,C5-6     HYSTERECTOMY TOTAL ABDOMINAL, BILATERAL SALPINGO-OOPHORECTOMY, COMBINED      11/04,bilateral salpingo-oophorectomy     LAPAROSCOPY DIAGNOSTIC (GENERAL)      6/04,02/07     OTHER SURGICAL HISTORY       x 2,TOM026,VAGINAL DELIVERY     OTHER SURGICAL HISTORY      1/19/2012,205448,REMOVAL OF FOREIGN BODY,ACDF C4-5 and C6-7 with hardware removal [Other][[[       Review of Systems  CONSTITUTIONAL: NEGATIVE for fever, chills, change in weight  INTEGUMENTARY/SKIN: NEGATIVE for worrisome rashes, moles or lesions  EYES: NEGATIVE for vision changes or irritation  ENT: NEGATIVE for ear, mouth and throat problems  RESP: NEGATIVE for significant cough or SOB  BREAST: NEGATIVE for masses, tenderness or discharge  CV: NEGATIVE for chest pain, palpitations or peripheral edema  GI: NEGATIVE for nausea, abdominal pain, heartburn, or change in bowel habits  : NEGATIVE for unusual urinary or vaginal symptoms. No vaginal bleeding.  MUSCULOSKELETAL: NEGATIVE for significant arthralgias or myalgia  NEURO: NEGATIVE for weakness, dizziness or paresthesias  PSYCHIATRIC: NEGATIVE for changes in mood or affect      OBJECTIVE:   /72   Pulse 66   Temp 98.2  F (36.8  C)   Resp 16   Ht 1.613 m (5' 3.5\")   Wt 62.7 kg (138 lb 3.2 oz)   SpO2 97%   BMI 24.10 kg/m    Physical Exam  GENERAL APPEARANCE: healthy, alert and no distress  EYES: Eyes grossly normal to inspection, PERRL and conjunctivae and sclerae normal  HENT: ear canals and TM's normal, nose and mouth without ulcers or lesions, oropharynx clear and oral mucous membranes moist  NECK: no adenopathy, no asymmetry, masses, or scars and thyroid normal to palpation  RESP: lungs clear to auscultation - no rales, rhonchi or wheezes  CV: regular rate and rhythm, normal S1 S2, no S3 or S4, no murmur, click or rub, no peripheral " edema and peripheral pulses strong  ABDOMEN: soft, nontender, no hepatosplenomegaly, no masses and bowel sounds normal   (female): normal female external genitalia, normal urethral meatus, vaginal mucosal atrophy noted, normal cervix, adnexae, and uterus without masses or abnormal discharge. Pap smear done, nurse present.   MS: no musculoskeletal defects are noted and gait is age appropriate without ataxia  SKIN: no suspicious lesions or rashes  NEURO: Normal strength and tone, sensory exam grossly normal, mentation intact and speech normal  PSYCH: mentation appears normal and affect normal/bright    Diagnostic Test Results:  Labs reviewed in Epic  Results for orders placed or performed in visit on 05/04/22   TSH     Status: Abnormal   Result Value Ref Range    TSH 6.46 (H) 0.40 - 4.00 mU/L   Lipid Profile     Status: Abnormal   Result Value Ref Range    Cholesterol 248 (H) <200 mg/dL    Triglycerides 122 <150 mg/dL    Direct Measure HDL 59 23 - 92 mg/dL    LDL Cholesterol Calculated 165 (H) <=100 mg/dL    Non HDL Cholesterol 189 (H) <130 mg/dL    Patient Fasting > 8hrs? No     Narrative    Cholesterol  Desirable:  <200 mg/dL    Triglycerides  Normal:  Less than 150 mg/dL  Borderline High:  150-199 mg/dL  High:  200-499 mg/dL  Very High:  Greater than or equal to 500 mg/dL    Direct Measure HDL  Female:  Greater than or equal to 50 mg/dL   Male:  Greater than or equal to 40 mg/dL    LDL Cholesterol  Desirable:  <100mg/dL  Above Desirable:  100-129 mg/dL   Borderline High:  130-159 mg/dL   High:  160-189 mg/dL   Very High:  >= 190 mg/dL    Non HDL Cholesterol  Desirable:  130 mg/dL  Above Desirable:  130-159 mg/dL  Borderline High:  160-189 mg/dL  High:  190-219 mg/dL  Very High:  Greater than or equal to 220 mg/dL         ASSESSMENT/PLAN:       ICD-10-CM    1. Routine general medical examination at a health care facility  Z00.00 Lipid Profile     PAP screen with HPV - recommended age 30 - 65 years     Lipid  "Profile   2. Hypothyroidism, unspecified type  E03.9 TSH     TSH     levothyroxine (SYNTHROID/LEVOTHROID) 125 MCG tablet     DISCONTINUED: levothyroxine (SYNTHROID/LEVOTHROID) 112 MCG tablet   3. Encounter for screening mammogram for breast cancer  Z12.31 MA Screen Bilateral w/Lb     Increased the synthroid dose, repeat TSH in 8 or more weeks      COUNSELING:  Reviewed preventive health counseling, as reflected in patient instructions       Regular exercise       Healthy diet/nutrition       Osteoporosis prevention/bone health    Estimated body mass index is 24.1 kg/m  as calculated from the following:    Height as of this encounter: 1.613 m (5' 3.5\").    Weight as of this encounter: 62.7 kg (138 lb 3.2 oz).        She reports that she has been smoking cigarettes. She has been smoking about 0.50 packs per day. She has never used smokeless tobacco.  Tobacco Cessation Action Plan:   Information offered: Patient not interested at this time      Counseling Resources:  ATP IV Guidelines  Pooled Cohorts Equation Calculator  Breast Cancer Risk Calculator  BRCA-Related Cancer Risk Assessment: FHS-7 Tool  FRAX Risk Assessment  ICSI Preventive Guidelines  Dietary Guidelines for Americans, 2010  USDA's MyPlate  ASA Prophylaxis  Lung CA Screening    Jerardo Navarrete MD  [facility] AND [facility]  "

## 2022-05-10 LAB
BKR LAB AP GYN ADEQUACY: NORMAL
BKR LAB AP GYN INTERPRETATION: NORMAL
BKR LAB AP HPV REFLEX: NORMAL
BKR LAB AP PREVIOUS ABNORMAL: NORMAL
PATH REPORT.COMMENTS IMP SPEC: NORMAL
PATH REPORT.COMMENTS IMP SPEC: NORMAL
PATH REPORT.RELEVANT HX SPEC: NORMAL

## 2022-05-13 LAB
HUMAN PAPILLOMA VIRUS 16 DNA: NEGATIVE
HUMAN PAPILLOMA VIRUS 18 DNA: NEGATIVE
HUMAN PAPILLOMA VIRUS FINAL DIAGNOSIS: NORMAL
HUMAN PAPILLOMA VIRUS OTHER HR: NEGATIVE

## 2022-07-23 ENCOUNTER — OFFICE VISIT (OUTPATIENT)
Dept: FAMILY MEDICINE | Facility: OTHER | Age: 47
End: 2022-07-23
Attending: PHYSICIAN ASSISTANT
Payer: COMMERCIAL

## 2022-07-23 VITALS
OXYGEN SATURATION: 99 % | HEIGHT: 62 IN | WEIGHT: 137.3 LBS | DIASTOLIC BLOOD PRESSURE: 72 MMHG | BODY MASS INDEX: 25.27 KG/M2 | HEART RATE: 62 BPM | TEMPERATURE: 99.3 F | SYSTOLIC BLOOD PRESSURE: 138 MMHG | RESPIRATION RATE: 16 BRPM

## 2022-07-23 DIAGNOSIS — B02.9 HERPES ZOSTER WITHOUT COMPLICATION: Primary | ICD-10-CM

## 2022-07-23 PROCEDURE — 99213 OFFICE O/P EST LOW 20 MIN: CPT | Performed by: PHYSICIAN ASSISTANT

## 2022-07-23 RX ORDER — VALACYCLOVIR HYDROCHLORIDE 1 G/1
1000 TABLET, FILM COATED ORAL 3 TIMES DAILY
Qty: 21 TABLET | Refills: 0 | Status: SHIPPED | OUTPATIENT
Start: 2022-07-23 | End: 2023-04-24

## 2022-07-23 ASSESSMENT — PAIN SCALES - GENERAL: PAINLEVEL: NO PAIN (0)

## 2022-07-23 NOTE — PROGRESS NOTES
ASSESSMENT/PLAN:    I have reviewed the nursing notes.  I have reviewed the findings, diagnosis, plan and need for follow up with the patient.    1. Herpes zoster without complication  - valACYclovir (VALTREX) 1000 mg tablet; Take 1 tablet (1,000 mg) by mouth 3 times daily for 7 days  Dispense: 21 tablet; Refill: 0  - Vitals stable. Examination consistent with uncomplicated shingles. Will treat with Valtrex 1000 mg PO TID x 7 days, discussed side effects of medication.  - We discussed the goals of antiviral therapy are to promote more rapid healing of skin lesions, lessen the severity and duration of pain associated with acute neuritis, and potentially reduce the incidence or severity of chronic pain, referred to as postherpetic neuralgia. Discussed side effects of medication as well. OK to alternate Tylenol and Ibuprofen. Avoid picking/scratching at lesions. Return if concerns of secondary infection occurs. Follow up with neurologic symptoms - changes to vision, ear pain, etc. Patients with herpes zoster can transmit VZV to individuals who have not had varicella and have not received the varicella vaccine. Until the rash has crusted, patients should be advised to keep the rash covered, if feasible, and to wash their hands often to prevent the spread of virus to others. Patient is in agreement and understanding of the above treatment plan. All questions and concerns were addressed and answered to patient's satisfaction. AVS reviewed with patient.     Discussed warning signs/symptoms indicative of need to f/u    Follow up if symptoms persist or worsen or concerns    I explained my diagnostic considerations and recommendations to the patient, who voiced understanding and agreement with the treatment plan. All questions were answered. We discussed potential side effects of any prescribed or recommended therapies, as well as expectations for response to treatments.    Melinda Adams PA-C  7/23/2022  2:07  PM    HPI:    Mallorie Machado is a 46 year old female  who presents to [facility] today for concerns of rash, x upper right back and right side onset today    Description:   Location: right upper back and lower (mid back)  Character: round, burning, red  Itching (Pruritis): burning  Description: Stable    Progression of Symptoms:  same    Accompanying Signs & Symptoms:  Fever: no  Body aches or joint pain: no  Sore throat symptoms: no  Recent cold symptoms: no    History:   Previous similar rash: no    Precipitating factors:   Similar rash in past: no  New exposures: None   Recent travel: no  Exacerbating Factors: unsure    Alleviating factors: none    Therapies Tried and outcome: none    Exposure History: none known    Additional symptoms: none    + chicken pox as child    Recent medication or other changes: none     PCP: MD [facility]    Past Medical History:   Diagnosis Date     Allergic rhinitis due to pollen     No Comments Provided     Cyst of ovary     1994     Dermatitis     scalp     Dysmenorrhea     No Comments Provided     Dysthymic disorder     No Comments Provided     Endometriosis     No Comments Provided     Hypothyroidism     No Comments Provided     Injury of lower back     02/07,Cervical disc injury, C5-6, with improvement (work comp related)     Low back pain     No Comments Provided     Obesity     No Comments Provided     Other cervical disc degeneration, unspecified cervical region     No Comments Provided     Other cervical disc displacement, unspecified cervical region     No Comments Provided     Personal history of other diseases of the female genital tract     G2, P2-0-0-2, vaginal deliveries     Uncomplicated asthma     No Comments Provided     Past Surgical History:   Procedure Laterality Date     ATTEMPTED ARTHROSCOPY      1/20/06,left wrist arthroscopy with debridement - Dr. aMhmood, [facility]     FUSION CERVICAL ANTERIOR ONE LEVEL      2008,C5-6     HYSTERECTOMY TOTAL ABDOMINAL,  "BILATERAL SALPINGO-OOPHORECTOMY, COMBINED      11/04,bilateral salpingo-oophorectomy     LAPAROSCOPY DIAGNOSTIC (GENERAL)      6/04,02/07     OTHER SURGICAL HISTORY       x 2,UDS253,VAGINAL DELIVERY     OTHER SURGICAL HISTORY      1/19/2012,205448,REMOVAL OF FOREIGN BODY,ACDF C4-5 and C6-7 with hardware removal [Other][[[     Social History     Tobacco Use     Smoking status: Current Every Day Smoker     Packs/day: 0.50     Types: Cigarettes     Smokeless tobacco: Never Used   Substance Use Topics     Alcohol use: Not Currently     Alcohol/week: 0.0 standard drinks     Current Outpatient Medications   Medication Sig Dispense Refill     levothyroxine (SYNTHROID/LEVOTHROID) 125 MCG tablet Take 1 tablet (125 mcg) by mouth daily 90 tablet 4     Allergies   Allergen Reactions     Amoxil [Amoxicillin] Other (See Comments), Nausea and Vomiting and GI Disturbance     Lethargic     Hydrocodone-Acetaminophen Itching and Nausea     Meperidine Itching     No hives     Morphine      Other reaction(s): Erythema  Face & hands     Oxycodone-Acetaminophen Itching     Propoxyphene N-Apap      Other reaction(s): Throat Swelling/Closing     Past medical history, past surgical history, current medications and allergies reviewed and accurate to the best of my knowledge.      ROS:  Refer to HPI    /72 (BP Location: Right arm, Patient Position: Sitting, Cuff Size: Adult Regular)   Pulse 62   Temp 99.3  F (37.4  C) (Tympanic)   Resp 16   Ht 1.581 m (5' 2.25\")   Wt 62.3 kg (137 lb 4.8 oz)   SpO2 99%   BMI 24.91 kg/m      EXAM:  General Appearance: Well appearing 46 year old female, appropriate appearance for age. No acute distress   Ears: Left TM intact, translucent with bony landmarks appreciated, no erythema, no effusion, no bulging, no purulence.  Right TM intact, translucent with bony landmarks appreciated, no erythema, no effusion, no bulging, no purulence.  Left auditory canal clear.  Right auditory canal clear.  Normal " external ears, non tender. No lesions noted.   Eyes: conjunctivae normal without erythema or irritation, corneas clear, no drainage or crusting, no eyelid swelling, pupils equal   Oropharynx: moist mucous membranes, posterior pharynx without erythema, tonsils symmetric, no erythema, no exudates or petechiae, no post nasal drip seen, no trismus, voice clear.    Sinuses:  No sinus tenderness upon palpation of the frontal or maxillary sinuses  Nose:  Bilateral nares: no erythema, no edema, no drainage or congestion   Neck: supple without adenopathy  Respiratory: normal chest wall and respirations.  Normal effort.  Clear to auscultation bilaterally, no wheezing, crackles or rhonchi.  No increased work of breathing.  No cough appreciated.  Cardiac: RRR with no murmurs  Musculoskeletal:  Equal movement of bilateral upper extremities.  Equal movement of bilateral lower extremities.  Normal gait.    Dermatological: erythematous papules overlying dermatomes T6-T8 on right mid thoracic back. No vesicles, blistering or open lesions noted.   Psychological: normal affect, alert, oriented, and pleasant.     Labs:  None     Xray:  None

## 2022-07-23 NOTE — NURSING NOTE
Pt here for a rash on her back today.  She has had some back pain a few days prior to rash showing up.  It itches and burns.     Brooklyn Rivero CMA ([facility])......................7/23/2022  1:57 PM       Medication Reconciliation: complete    Brooklyn Rivero CMA  7/23/2022 1:57 PM

## 2022-10-03 ENCOUNTER — OFFICE VISIT (OUTPATIENT)
Dept: FAMILY MEDICINE | Facility: OTHER | Age: 47
End: 2022-10-03
Attending: NURSE PRACTITIONER
Payer: COMMERCIAL

## 2022-10-03 VITALS
DIASTOLIC BLOOD PRESSURE: 80 MMHG | TEMPERATURE: 98 F | HEART RATE: 64 BPM | OXYGEN SATURATION: 95 % | WEIGHT: 136.25 LBS | SYSTOLIC BLOOD PRESSURE: 148 MMHG | BODY MASS INDEX: 24.72 KG/M2 | RESPIRATION RATE: 20 BRPM

## 2022-10-03 DIAGNOSIS — J01.90 ACUTE SINUSITIS WITH SYMPTOMS > 10 DAYS: ICD-10-CM

## 2022-10-03 DIAGNOSIS — J20.9 ACUTE BRONCHITIS WITH SYMPTOMS > 10 DAYS: Primary | ICD-10-CM

## 2022-10-03 PROCEDURE — 99213 OFFICE O/P EST LOW 20 MIN: CPT | Performed by: PHYSICIAN ASSISTANT

## 2022-10-03 RX ORDER — AZITHROMYCIN 250 MG/1
TABLET, FILM COATED ORAL
Qty: 6 TABLET | Refills: 0 | Status: SHIPPED | OUTPATIENT
Start: 2022-10-03 | End: 2022-10-08

## 2022-10-03 RX ORDER — PREDNISONE 20 MG/1
40 TABLET ORAL DAILY
Qty: 10 TABLET | Refills: 0 | Status: SHIPPED | OUTPATIENT
Start: 2022-10-03 | End: 2022-10-08

## 2022-10-03 ASSESSMENT — PAIN SCALES - GENERAL: PAINLEVEL: MODERATE PAIN (4)

## 2022-10-03 NOTE — PATIENT INSTRUCTIONS
You were prescribed an antibiotic, please take into consideration the following information:  - Take entire course of antibiotic even if you start to feel better.  - Antibiotics can cause stomach upset including nausea and diarrhea. Read your bottle or ask the pharmacist if antibiotic can be taken with food to help prevent nausea. If you have symptoms of diarrhea you can take an over-the-counter probiotic and/or increase foods with probiotics such as yogurt, [place], sauerkraut.  -Use caution in sunlight as can lead to increased risk of sunburn while on ABX (antibiotics).     Please refer to your AVS for follow up and pain/symptoms management recommendations (I.e.: medications, helpful conservative treatment modalities, appropriate follow up if need to a specialist or family practice, etc.). Please return to urgent care if your symptoms change or worsen.     Discharge instructions:  -If you were prescribed a medication(s), please take this as prescribed/directed  -Monitor your symptoms, if changing/worsening, return to UC/ER or PCP for follow up    Sinus Infection:   1. Dry out congestion with flonase (1spray in both nostrils 2x daily for 3-5 days) and pseudoephedrine (1-2 tabs every 4-6 hrs for 3-5 days) unless contraindicated     2. Antihistamine such as Claritin or Zyrtec, etc. Generic brands are OK as well.     3. Use a saline spray/Neti Pot/sinus flush ([place] Med Sinus Rinse) 2-3 times daily to irrigate sinuses/mucosal tissue. This dilutes and moves secretions.     4. Tylenol or ibuprofen for pain and fevers - alternate every 4 hours as needed. I.e.: Ibuprofen at 8am, Tylenol 12pm, Ibuprofen 4pm    -Daily maximum of Tylenol is 4000mg (recommend staying under 3000mg)   -Daily maximum of Ibuprofen is 3200 mg    5. Plenty of fluids and rest as needed.     6. Chew, yawn and speak to help eustachian tubes drain.     * If you are a smoker, try to quit *     - Consider the following over-the-counter products if you are  older than 1 year and not pregnant: honey/chestal for cough relief and sambucus/elderberry for viral upper-respiratory symptoms.

## 2022-10-03 NOTE — NURSING NOTE
"Chief Complaint   Patient presents with     Breathing Problem     Congestion      Patient presents today with chest congestion and difficulty breathing for well over a week now. She believes she has allergies as when she goes outside, her symptoms worsen.  Initial BP (!) 148/80   Pulse 64   Temp 98  F (36.7  C) (Tympanic)   Resp 20   Wt 61.8 kg (136 lb 4 oz)   LMP  (LMP Unknown)   SpO2 95%   BMI 24.72 kg/m   Estimated body mass index is 24.72 kg/m  as calculated from the following:    Height as of 7/23/22: 1.581 m (5' 2.25\").    Weight as of this encounter: 61.8 kg (136 lb 4 oz).  Medication Reconciliation: complete    Kyung Ramirez LPN     [facility] Care Directive reviewed.     "

## 2022-10-03 NOTE — PROGRESS NOTES
ASSESSMENT/PLAN:    I have reviewed the nursing notes.  I have reviewed the findings, diagnosis, plan and need for follow up with the patient.    1. Acute bronchitis with symptoms > 10 days  - azithromycin (ZITHROMAX) 250 MG tablet; Take 2 tablets (500 mg) by mouth daily for 1 day, THEN 1 tablet (250 mg) daily for 4 days.  Dispense: 6 tablet; Refill: 0  - predniSONE (DELTASONE) 20 MG tablet; Take 2 tablets (40 mg) by mouth daily for 5 days  Dispense: 10 tablet; Refill: 0  - Vital signs stable. PE consistent with URI. Declined chest x-ray. Symptoms present x 10 days, she would have already completed her quarantine for COVID, therefore, did not test today. She is higher risk for progression due to comorbid tobacco use, therefore, opted to treat with Zithromax and Prednisone burst. Take in AM with food. Side effects of both medications were reviewed with patient.     Recommend: increased fluids, rest, use of humidifier, antitussives (cough medication for adults), alternating tylenol and ibuprofen every 4-6 hours as needed (if able to take these medications), salt water gargles for sore throats or throat lozenges, honey, netti pots/saline rinses for sinus/congestion, as well as other home remedies. If worsening fevers, chills, uncontrollable nausea/vomiting, dehydration,etc., or other worsening signs occur, patient is agreeable to follow up for reevaluation.     Patient is in agreement and understanding of the above treatment plan. All questions and concerns were addressed and answered to patient's satisfaction. Patient declined AVS.     2. Acute sinusitis with symptoms > 10 days  - azithromycin (ZITHROMAX) 250 MG tablet; Take 2 tablets (500 mg) by mouth daily for 1 day, THEN 1 tablet (250 mg) daily for 4 days.  Dispense: 6 tablet; Refill: 0  - PE consistent with sinusitis. Discussed with patient that we recommend: to dry out congestion with flonase (1spray in both nostrils 2x daily for 3-5 days) and pseudoephedrine  (1-2 tabs every 4-6 hrs for 3-5 days) unless contraindicated, use a saline spray/Neti Pot/sinus flush ([place] Med Sinus Rinse) 2-3 times daily to irrigate sinuses/mucosal tissue. This dilutes and moves secretions. Alternate Tylenol or ibuprofen for pain and fevers - alternate every 4 hours as needed. Recommend plenty of fluids and rest as needed. Discussed that if a smoker, tobacco cessation recommended.     Discussed warning signs/symptoms indicative of need to f/u    Follow up if symptoms persist or worsen or concerns    I explained my diagnostic considerations and recommendations to the patient, who voiced understanding and agreement with the treatment plan. All questions were answered. We discussed potential side effects of any prescribed or recommended therapies, as well as expectations for response to treatments.    Melinda Adams PA-C  10/3/2022  11:04 AM    HPI:    Mallorie Machado is a 46 year old female  who presents to [facility] today for concerns of URI, x 1.5 weeks    Symptoms:  No fevers or chills.   No sore throat/pharyngitis/tonsillitis.   No allergy/URI Symptoms  No Muffled Sounds/Change in Hearing  No Sensation of Fullness in Ear(s)  No Ringing in Ears/Tinnitus  No Balance Changes  No Dizziness  Yes Congestion (head/nasal/chest)  Yes Cough/Productive Cough  Yes Post Nasal Drip  No Headache  No Sinus Pain/Pressure  No Myalgias  No Otalgia  Activity Level Changes: Yes: tired  Appetite/Liquid Intake Changes: No  Changes to Bowel Habits: No  Changes to Bladder Habits: No    Treatments tried: Fluids, Rest, Zyrtec daily, [place] [employer] time (without relief), Mucinex (has made her feel worse), Mucinex cough, Delsym, Xizol.     Site of exposure: not known.  Type of exposure: not known    Vaccination status:   - Influenza: not immunized at this time  - COVID: not immunized at this time    Allergies UTD    PCP: MD Landon    Past Medical History:   Diagnosis Date     Allergic rhinitis due to  pollen     No Comments Provided     Cyst of ovary     1994     Dermatitis     scalp     Dysmenorrhea     No Comments Provided     Dysthymic disorder     No Comments Provided     Endometriosis     No Comments Provided     Hypothyroidism     No Comments Provided     Injury of lower back     02/07,Cervical disc injury, C5-6, with improvement (work comp related)     Low back pain     No Comments Provided     Obesity     No Comments Provided     Other cervical disc degeneration, unspecified cervical region     No Comments Provided     Other cervical disc displacement, unspecified cervical region     No Comments Provided     Personal history of other diseases of the female genital tract     G2, P2-0-0-2, vaginal deliveries     Uncomplicated asthma     No Comments Provided     Past Surgical History:   Procedure Laterality Date     ATTEMPTED ARTHROSCOPY      1/20/06,left wrist arthroscopy with debridement - Dr. Mahmood, [facility]     FUSION CERVICAL ANTERIOR ONE LEVEL      2008,C5-6     HYSTERECTOMY TOTAL ABDOMINAL, BILATERAL SALPINGO-OOPHORECTOMY, COMBINED      11/04,bilateral salpingo-oophorectomy     LAPAROSCOPY DIAGNOSTIC (GENERAL)      6/04,02/07     OTHER SURGICAL HISTORY       x 2,[id number],VAGINAL DELIVERY     OTHER SURGICAL HISTORY      1/19/2012,205448,REMOVAL OF FOREIGN BODY,ACDF C4-5 and C6-7 with hardware removal [Other][[[     Social History     Tobacco Use     Smoking status: Current Every Day Smoker     Packs/day: 0.50     Types: Cigarettes     Smokeless tobacco: Never Used   Substance Use Topics     Alcohol use: Not Currently     Alcohol/week: 0.0 standard drinks     Current Outpatient Medications   Medication Sig Dispense Refill     Cetirizine HCl (ZYRTEC ALLERGY PO)        levothyroxine (SYNTHROID/LEVOTHROID) 125 MCG tablet Take 1 tablet (125 mcg) by mouth daily 90 tablet 4     valACYclovir (VALTREX) 1000 mg tablet Take 1 tablet (1,000 mg) by mouth 3 times daily for 7 days 21 tablet 0     Allergies    Allergen Reactions     Amoxil [Amoxicillin] Other (See Comments), Nausea and Vomiting and GI Disturbance     Lethargic     Hydrocodone-Acetaminophen Itching and Nausea     Meperidine Itching     No hives     Morphine      Other reaction(s): Erythema  Face & hands     Oxycodone-Acetaminophen Itching     Propoxyphene N-Apap      Other reaction(s): Throat Swelling/Closing     Past medical history, past surgical history, current medications and allergies reviewed and accurate to the best of my knowledge.      ROS:  Refer to HPI    BP (!) 148/80   Pulse 64   Temp 98  F (36.7  C) (Tympanic)   Resp 20   Wt 61.8 kg (136 lb 4 oz)   LMP  (LMP Unknown)   SpO2 95%   BMI 24.72 kg/m      EXAM:  General Appearance: Well appearing 46 year old female, appropriate appearance for age. No acute distress   Ears: Left TM intact, translucent with bony landmarks appreciated, no erythema, no effusion, no bulging, no purulence.  Right TM intact, translucent with bony landmarks appreciated, no erythema, no effusion, no bulging, no purulence.  Left auditory canal clear.  Right auditory canal clear.  Normal external ears, non tender.  Eyes: conjunctivae normal without erythema or irritation, corneas clear, no drainage or crusting, no eyelid swelling, pupils equal   Oropharynx: moist mucous membranes, posterior pharynx without erythema, tonsils symmetric, no erythema, no exudates or petechiae, + post nasal drip seen, no trismus, voice clear.    Sinuses:  Diffuse sinus pain and pressure to palpation over bifrontal and bilateral maxillary sinuses  Nose:  Bilateral nares: no erythema, no edema, + congestion, purulent drainage  Neck: supple without adenopathy  Respiratory: normal chest wall and respirations. No crackles or rhonchi.  No increased work of breathing. Dry cough, mild bilateral lower lobe wheezing.   Cardiac: RRR with no murmurs    Dermatological: no rashes noted of exposed skin  Psychological: normal affect, alert, oriented,  and pleasant.     Labs:  None     Xray:  None - declined

## 2022-12-02 ENCOUNTER — OFFICE VISIT (OUTPATIENT)
Dept: FAMILY MEDICINE | Facility: OTHER | Age: 47
End: 2022-12-02
Attending: NURSE PRACTITIONER
Payer: COMMERCIAL

## 2022-12-02 VITALS
RESPIRATION RATE: 18 BRPM | BODY MASS INDEX: 25.27 KG/M2 | DIASTOLIC BLOOD PRESSURE: 80 MMHG | HEART RATE: 66 BPM | WEIGHT: 139.3 LBS | SYSTOLIC BLOOD PRESSURE: 118 MMHG | TEMPERATURE: 97.7 F | OXYGEN SATURATION: 98 %

## 2022-12-02 DIAGNOSIS — B37.9 ANTIBIOTIC-INDUCED YEAST INFECTION: ICD-10-CM

## 2022-12-02 DIAGNOSIS — J01.90 ACUTE SINUSITIS WITH SYMPTOMS > 10 DAYS: Primary | ICD-10-CM

## 2022-12-02 DIAGNOSIS — T36.95XA ANTIBIOTIC-INDUCED YEAST INFECTION: ICD-10-CM

## 2022-12-02 PROCEDURE — 99213 OFFICE O/P EST LOW 20 MIN: CPT | Performed by: PHYSICIAN ASSISTANT

## 2022-12-02 RX ORDER — FLUCONAZOLE 150 MG/1
150 TABLET ORAL
Qty: 3 TABLET | Refills: 0 | Status: SHIPPED | OUTPATIENT
Start: 2022-12-02 | End: 2022-12-09

## 2022-12-02 RX ORDER — DOXYCYCLINE 100 MG/1
100 CAPSULE ORAL 2 TIMES DAILY
Qty: 14 CAPSULE | Refills: 0 | Status: SHIPPED | OUTPATIENT
Start: 2022-12-02 | End: 2022-12-09

## 2022-12-02 ASSESSMENT — PAIN SCALES - GENERAL: PAINLEVEL: MILD PAIN (2)

## 2022-12-02 NOTE — PROGRESS NOTES
Assessment & Plan   1. Acute sinusitis with symptoms > 10 days  - doxycycline hyclate (VIBRAMYCIN) 100 MG capsule; Take 1 capsule (100 mg) by mouth 2 times daily for 7 days  Dispense: 14 capsule; Refill: 0  - Vital signs stable. PE consistent with sinusitis. Discussed with patient that we recommend: to dry out congestion with flonase (1spray in both nostrils 2x daily for 3-5 days) and pseudoephedrine (1-2 tabs every 4-6 hrs for 3-5 days) unless contraindicated, use a saline spray/Neti Pot/sinus flush ([place] Med Sinus Rinse) 2-3 times daily to irrigate sinuses/mucosal tissue. This dilutes and moves secretions. Alternate Tylenol or ibuprofen for pain and fevers - alternate every 4 hours as needed. Recommend plenty of fluids and rest as needed. Discussed that if a smoker, tobacco cessation recommended. Discussed that an antibiotic was prescribed today for bacterial sinusitis to take the entire course of antibiotic, discussed side effect profile of prescribed medications. Patient is in agreement and understanding of the above treatment plan. All questions and concerns were addressed and answered to patient's satisfaction. AVS reviewed with patient.     2. Antibiotic-induced yeast infection  - fluconazole (DIFLUCAN) 150 MG tablet; Take 1 tablet (150 mg) by mouth every 3 days for 3 doses  Dispense: 3 tablet; Refill: 0  - History of antibiotic induced yeast infection. Will provide script for Diflucan.     Return if symptoms worsen or fail to improve.    Melinda Adams PA-C  [facility]    Fabiola Nobles is a 47 year old accompanied by her self, presenting for the following health issues:  Sinus Problem (For a month)    HPI   Concerns of sinus infection x 1 month. Seen 10/3 for bronchitis, this has resolved with azithro and prednisone. Now main concern is sinus infection, reports getting annually and sometimes twice a year. Takes zyrtec daily. Does not use Flonase due to nose bleed and headaches.  Nasal spray did not help, burned terribly.    Symptoms:   Location: left maxillary, right maxillary, left frontal, right frontal  Nasal congestion: Yes:    Purulent nasal discharge: Yes: green/yellow  Headache: Yes:    Facial pain: Yes:     Cough: Yes: only from post-nasal drip  Tooth pain/symptoms: No  Myalgias: No  Presence of fever: No    Halitosis: No   Anosmia: No    Metallic taste in the mouth: No     Treatments tried: fluids and rest, daily zyrtec year around. Very sensitive to medications, felt she would pass out from sudafed.      History of similar symptoms: Yes  Prior workup: Yes:      PCP: [facility]    Review of Systems   Constitutional, HEENT, cardiovascular, pulmonary, gi and gu systems are negative, except as otherwise noted.      Objective    /80 (BP Location: Left arm, Patient Position: Sitting, Cuff Size: Adult Regular)   Pulse 66   Temp 97.7  F (36.5  C) (Tympanic)   Resp 18   Wt 63.2 kg (139 lb 4.8 oz)   LMP  (LMP Unknown)   SpO2 98%   BMI 25.27 kg/m    Body mass index is 25.27 kg/m .  Physical Exam   GENERAL: healthy, alert and no distress  HENT: ear canals and TM's + bulging, no erythema, no effusion, nose and mouth without ulcers or lesions, + post nasal drip. + sinus tenderness frontal and maxillary (bilateral)   NECK: no adenopathy, no asymmetry, masses, or scars and thyroid normal to palpation  RESP: lungs clear to auscultation - no rales, rhonchi or wheezes  CV: regular rate and rhythm, normal S1 S2, no S3 or S4, no murmur, click or rub, no peripheral edema and peripheral pulses strong  MS: no gross musculoskeletal defects noted, no edema

## 2022-12-02 NOTE — PATIENT INSTRUCTIONS
You were prescribed an antibiotic, please take into consideration the following information:  - Take entire course of antibiotic even if you start to feel better.  - Antibiotics can cause stomach upset including nausea and diarrhea. Read your bottle or ask the pharmacist if antibiotic can be taken with food to help prevent nausea. If you have symptoms of diarrhea you can take an over-the-counter probiotic and/or increase foods with probiotics such as yogurt, [place], sauerkraut.  -Use caution in sunlight as can lead to increased risk of sunburn while on ABX (antibiotics).     Please refer to your AVS for follow up and pain/symptoms management recommendations (I.e.: medications, helpful conservative treatment modalities, appropriate follow up if need to a specialist or family practice, etc.). Please return to urgent care if your symptoms change or worsen.     Discharge instructions:  -If you were prescribed a medication(s), please take this as prescribed/directed  -Monitor your symptoms, if changing/worsening, return to UC/ER or PCP for follow up    Sinus Infection:   1. Dry out congestion with flonase (1 spray in both nostrils 2x daily for 3-5 days) and pseudoephedrine (1-2 tabs every 4-6 hrs for 3-5 days) unless contraindicated     2. Antihistamine such as Claritin or Zyrtec, etc. Generic brands are OK as well.     3. Use a saline spray/Neti Pot/sinus flush ([place] Med Sinus Rinse) 2-3 times daily to irrigate sinuses/mucosal tissue. This dilutes and moves secretions.     4. Tylenol or ibuprofen for pain and fevers - alternate every 4 hours as needed. I.e.: Ibuprofen at 8am, Tylenol 12pm, Ibuprofen 4pm    -Daily maximum of Tylenol is 4000mg (recommend staying under 3000mg)   -Daily maximum of Ibuprofen is 3200 mg    5. Plenty of fluids and rest as needed.     6. Chew, yawn and speak to help eustachian tubes drain.     * If you are a smoker, try to quit *     - Consider the following over-the-counter products if you are  older than 1 year and not pregnant: honey/chestal for cough relief and sambucus/elderberry for viral upper-respiratory symptoms.

## 2022-12-02 NOTE — NURSING NOTE
Pt states that she has been having sinus issues for the last month.  Thought it was allergies.  Lots of nasal congestion and draingae

## 2023-02-01 ENCOUNTER — OFFICE VISIT (OUTPATIENT)
Dept: FAMILY MEDICINE | Facility: OTHER | Age: 48
End: 2023-02-01
Attending: NURSE PRACTITIONER
Payer: COMMERCIAL

## 2023-02-01 VITALS
BODY MASS INDEX: 26.31 KG/M2 | OXYGEN SATURATION: 95 % | DIASTOLIC BLOOD PRESSURE: 80 MMHG | HEART RATE: 68 BPM | TEMPERATURE: 98.3 F | HEIGHT: 62 IN | WEIGHT: 143 LBS | SYSTOLIC BLOOD PRESSURE: 134 MMHG | RESPIRATION RATE: 20 BRPM

## 2023-02-01 DIAGNOSIS — J01.90 ACUTE SINUSITIS WITH SYMPTOMS > 10 DAYS: Primary | ICD-10-CM

## 2023-02-01 DIAGNOSIS — H66.001 NON-RECURRENT ACUTE SUPPURATIVE OTITIS MEDIA OF RIGHT EAR WITHOUT SPONTANEOUS RUPTURE OF TYMPANIC MEMBRANE: ICD-10-CM

## 2023-02-01 PROCEDURE — 99213 OFFICE O/P EST LOW 20 MIN: CPT

## 2023-02-01 RX ORDER — AZITHROMYCIN 250 MG/1
TABLET, FILM COATED ORAL
Qty: 6 TABLET | Refills: 0 | Status: SHIPPED | OUTPATIENT
Start: 2023-02-01 | End: 2023-02-06

## 2023-02-01 ASSESSMENT — ENCOUNTER SYMPTOMS
SWOLLEN GLANDS: 0
SHORTNESS OF BREATH: 0
COUGH: 0
CHILLS: 0
SINUS PRESSURE: 1
SORE THROAT: 1

## 2023-02-01 ASSESSMENT — PAIN SCALES - GENERAL: PAINLEVEL: SEVERE PAIN (7)

## 2023-02-01 NOTE — PATIENT INSTRUCTIONS
START  Azithromycin 2 tablets today and then 1 a day for the next 4 days.       Symptomatic treatments:    Symptoms due to virus. No antibiotic is needed at this time. Symptoms typically worse on days 3-4 and then begin improving each day - but can last up to 1 or 2 weeks.If symptoms begin worsening or fail to improve after 10 days, return to clinic for reevaluation.     Monitor for any fevers or chills. Return in 7-10 days if not feeling better. Please call clinic with any questions or concerns. Please take in a lot of fluids and get rest.     Push oral hydration - prevent dehydration.  Urine should be clear or light yellow.      Mucinex DM or Sudafed- helps thin the phlegm and settle the cough, without causing drowsiness.   -Maximum strength (buy the generic)      May take tylenol as needed for sore throat. Treat symptomatically with warm salt water gargles. Lozenges, Tylenol, Advil orAleve as needed. Frequent swallows of cool liquid. Oatmeal coats the throat and some patients find it soothes the pain.     Follow up in clinic if:   You have a fever of at least 101 F or 38.4 C   Your throat pain is severe or does not start toimprove within 5 to 7 days  Call 9-1-1 or go to the emergency room if you:   Have trouble breathing   Are drooling because you cannot swallow your saliva   Have swelling of the neck or tongue   Cannot move your neck or have trouble opening your mouth

## 2023-02-01 NOTE — NURSING NOTE
"Chief Complaint   Patient presents with     Sinus Problem     Sinus pressure/pain, congestion bloody discharge         Initial /80   Pulse 68   Temp 98.3  F (36.8  C) (Tympanic)   Resp 20   Ht 1.581 m (5' 2.25\")   Wt 64.9 kg (143 lb)   LMP  (LMP Unknown)   SpO2 95%   Breastfeeding No   BMI 25.95 kg/m   Estimated body mass index is 25.95 kg/m  as calculated from the following:    Height as of this encounter: 1.581 m (5' 2.25\").    Weight as of this encounter: 64.9 kg (143 lb).         Norma J. Gosselin, JOCELYN   "

## 2023-02-01 NOTE — PROGRESS NOTES
Assessment & Plan   Mallorie Machado is a 47 year old presenting for the following health issues:      ICD-10-CM    1. Acute sinusitis with symptoms > 10 days  J01.90 azithromycin (ZITHROMAX) 250 MG tablet      2. Non-recurrent acute suppurative otitis media of right ear without spontaneous rupture of tympanic membrane  H66.001         Patient was prescribed 5 day course of azithromycin for non-recurrent acute otitis media of right ear and for sinus infection.  Encouraged her to continue using Mucinex, Tylenol, ibuprofen for symptoms.  Patient verbalized understanding and agreement of treatment plan.  She will return back if symptoms worsen or do not improve with antibiotic.        Return if symptoms worsen or fail to improve.    UMA Oliveira CNP  [facility]      Fabiola Nobles is a 47 year old, presenting for the following health issues:  Sinus Problem (Sinus pressure/pain, congestion bloody discharge)      Sinus Problem   This is a new problem. The current episode started more than 2 days ago. The problem has been gradually worsening. There has been no fever. The pain is at a severity of 7/10. The patient is experiencing no pain. Associated symptoms include congestion, ear pain ( bilaterally), sinus pressure and sore throat ( dry). Pertinent negatives include no chills, no swollen glands, no cough and no shortness of breath. Treatments tried: ibuprofen and nasal spray. The treatment provided moderate relief.        Review of Systems   Constitutional: Negative for chills and fever.   HENT: Positive for congestion, ear pain ( bilaterally), sinus pressure and sore throat ( dry).    Respiratory: Negative for cough and shortness of breath.    Gastrointestinal: Negative for abdominal pain and diarrhea.   Neurological: Negative for dizziness.   All other systems reviewed and are negative.           Objective    /80   Pulse 68   Temp 98.3  F (36.8  C) (Tympanic)   Resp 20   Ht  "1.581 m (5' 2.25\")   Wt 64.9 kg (143 lb)   LMP  (LMP Unknown)   SpO2 95%   Breastfeeding No   BMI 25.95 kg/m    Body mass index is 25.95 kg/m .  Physical Exam  Vitals reviewed.   Constitutional:       General: She is not in acute distress.     Appearance: Normal appearance. She is not toxic-appearing.   HENT:      Head: Normocephalic and atraumatic.      Jaw: No trismus.      Right Ear: Tenderness present. Tympanic membrane is erythematous.      Left Ear: Tympanic membrane, ear canal and external ear normal. Tympanic membrane is not erythematous or bulging.      Ears:      Comments: purulent fluid behind TM of right ear     Nose:      Right Sinus: Maxillary sinus tenderness and frontal sinus tenderness present.      Left Sinus: Maxillary sinus tenderness and frontal sinus tenderness present.      Mouth/Throat:      Mouth: Mucous membranes are moist.      Pharynx: No oropharyngeal exudate or posterior oropharyngeal erythema.      Tonsils: No tonsillar abscesses.   Cardiovascular:      Rate and Rhythm: Normal rate and regular rhythm.   Pulmonary:      Effort: Pulmonary effort is normal.      Breath sounds: Normal breath sounds. No wheezing or rhonchi.   Neurological:      Mental Status: She is alert.                            "

## 2023-02-02 ASSESSMENT — ENCOUNTER SYMPTOMS
DIARRHEA: 0
FEVER: 0
ABDOMINAL PAIN: 0
DIZZINESS: 0

## 2023-02-21 ENCOUNTER — OFFICE VISIT (OUTPATIENT)
Dept: FAMILY MEDICINE | Facility: OTHER | Age: 48
End: 2023-02-21
Attending: FAMILY MEDICINE
Payer: COMMERCIAL

## 2023-02-21 VITALS
HEART RATE: 72 BPM | RESPIRATION RATE: 20 BRPM | TEMPERATURE: 97.2 F | DIASTOLIC BLOOD PRESSURE: 70 MMHG | OXYGEN SATURATION: 98 % | SYSTOLIC BLOOD PRESSURE: 132 MMHG

## 2023-02-21 DIAGNOSIS — J01.41 ACUTE RECURRENT PANSINUSITIS: Primary | ICD-10-CM

## 2023-02-21 DIAGNOSIS — Z87.891 PERSONAL HISTORY OF TOBACCO USE, PRESENTING HAZARDS TO HEALTH: ICD-10-CM

## 2023-02-21 DIAGNOSIS — R09.81 NASAL CONGESTION: ICD-10-CM

## 2023-02-21 DIAGNOSIS — J30.1 SEASONAL ALLERGIC RHINITIS DUE TO POLLEN: ICD-10-CM

## 2023-02-21 PROCEDURE — 99213 OFFICE O/P EST LOW 20 MIN: CPT | Performed by: FAMILY MEDICINE

## 2023-02-21 RX ORDER — PREDNISONE 20 MG/1
20 TABLET ORAL DAILY
Qty: 5 TABLET | Refills: 0 | Status: SHIPPED | OUTPATIENT
Start: 2023-02-21 | End: 2023-02-26

## 2023-02-21 RX ORDER — MONTELUKAST SODIUM 10 MG/1
10 TABLET ORAL
Qty: 90 TABLET | Refills: 0 | Status: SHIPPED | OUTPATIENT
Start: 2023-02-21 | End: 2023-09-29

## 2023-02-21 ASSESSMENT — PAIN SCALES - GENERAL: PAINLEVEL: MILD PAIN (3)

## 2023-02-21 NOTE — NURSING NOTE
"Patient presents to the clinic for right side, sore throat.    FOOD SECURITY SCREENING QUESTIONS:    The next two questions are to help us understand your food security.  If you are feeling you need any assistance in this area, we have resources available to support you today.    Hunger Vital Signs:  Within the past 12 months we worried whether our food would run out before we got money to buy more. Never  Within the past 12 months the food we bought just didn't last and we didn't have money to get more. Never    Advance Care Directive on file? no  Advance Care Directive provided to patient? Declined.      Chief Complaint   Patient presents with     Throat Problem       Initial /70 (BP Location: Right arm, Patient Position: Sitting, Cuff Size: Adult Large)   Pulse 72   Temp 97.2  F (36.2  C) (Tympanic)   Resp 20   LMP  (LMP Unknown)   SpO2 98%  Estimated body mass index is 25.95 kg/m  as calculated from the following:    Height as of 2/1/23: 1.581 m (5' 2.25\").    Weight as of 2/1/23: 64.9 kg (143 lb).  Medication Reconciliation: complete        Cynthia Lycnh LPN       "

## 2023-02-21 NOTE — PATIENT INSTRUCTIONS
Prednisone 20 mg daily for 5 days to help clear sinuses  Referral to ENT for evaluation  Consider Singulair prior to spring and fall allergies

## 2023-02-21 NOTE — PROGRESS NOTES
Assessment & Plan       ICD-10-CM    1. Acute recurrent pansinusitis  J01.41 Adult ENT  Referral     predniSONE (DELTASONE) 20 MG tablet      2. Nasal congestion  R09.81 Adult ENT  Referral      3. Seasonal allergic rhinitis due to pollen  J30.1 montelukast (SINGULAIR) 10 MG tablet      4. Personal history of tobacco use, presenting hazards to health  Z87.891         Sore throat appears to be from postnasal drainage.  No abnormality of the pharynx on exam.  She said 4 episodes of sinusitis treated in the past year  On exam has an abnormality in the left nare, likely inflamed turbinates, but cannot rule out mass  Is a smoker, this should be evaluated    We discussed reasons for recurrent sinusitis.  She does have some allergies, but is intolerant of most allergy treatments.  Nasal steroids cause epistaxis.  Antihistamines and decongestants have caused adverse effects.  She is tolerating cetirizine.  Has not tried montelukast, can utilize this in the future when allergies worsen.  Likely smoking is contributing to her recurrent sinusitis.  Recommend quitting.    Does not have symptoms clearly representing bacterial sinusitis at this time.  Recently treated with azithromycin which is less likely to be effective.  However a lot of her symptoms now seem like inflammation.  Due to intolerance to nasal steroids, treat with prednisone 20 mg daily for 5 days.  We also considered budesonide ampules and nasal saline, but she does not tolerate nasal saline rinse.  Can discuss further potential options with ENT.    Raman Moctezuma MD   [facility]     Fabiola Nobles is a 47 year old, presenting for the following health issues:  Throat Problem      History of Present Illness       Reason for visit:  Sore throat  Symptom onset:  3-4 weeks ago  Symptoms include:  Sore throat on right side  Symptom intensity:  Moderate  Symptom progression:  Staying the same  Had these symptoms before:   No  What makes it worse:  No  What makes it better:  No    She eats 4 or more servings of fruits and vegetables daily.She consumes 0 sweetened beverage(s) daily.She exercises with enough effort to increase her heart rate 60 or more minutes per day.  She exercises with enough effort to increase her heart rate 4 days per week.   She is taking medications regularly.     Ongoing sore throat   Temporarily improved with sinusitis treatment   Right side painful  Ongoing congestion      Review of Systems   As above      Objective    /70 (BP Location: Right arm, Patient Position: Sitting, Cuff Size: Adult Large)   Pulse 72   Temp 97.2  F (36.2  C) (Tympanic)   Resp 20   LMP  (LMP Unknown)   SpO2 98%   There is no height or weight on file to calculate BMI.  Physical Exam   General Appearance: Pleasant, alert, appropriate appearance for age. No acute distress  Ear Exam: Normal TM's bilaterally. Normal auditory canals and external ears.  Nose Exam: Nasal mucosa: clear rhinorrhea. Abnormality in the posterior left nare, likely inflamed turbinates, but cannot rule out polyps or mass  OroPharynx Exam:  Normal buccal mucosa. Normal pharynx.  Neck Exam: Supple, no masses or nodes.  Chest/Respiratory Exam: Normal chest wall and respirations. Clear to auscultation.

## 2023-04-24 ENCOUNTER — TELEPHONE (OUTPATIENT)
Dept: FAMILY MEDICINE | Facility: OTHER | Age: 48
End: 2023-04-24
Payer: COMMERCIAL

## 2023-04-24 DIAGNOSIS — E03.9 HYPOTHYROIDISM, UNSPECIFIED TYPE: ICD-10-CM

## 2023-04-24 RX ORDER — LEVOTHYROXINE SODIUM 125 UG/1
125 TABLET ORAL DAILY
Qty: 90 TABLET | Refills: 4 | Status: SHIPPED | OUTPATIENT
Start: 2023-04-24 | End: 2023-09-29

## 2023-04-24 NOTE — TELEPHONE ENCOUNTER
Reason for call: Medication or medication refill    Name of medication requested: Levothyroxine     Are you out of the medication? 8 left    What pharmacy do you use? [facility]    Preferred method for responding to this message: Telephone Call    Phone number patient can be reached at: Cell number on file:    Telephone Information:   Mobile [phone]       If we cannot reach you directly, may we leave a detailed response at the number you provided? Yes     Patient can't get in until May 18th needs a refill until then so levels read correct

## 2023-04-26 ENCOUNTER — OFFICE VISIT (OUTPATIENT)
Dept: OTOLARYNGOLOGY | Facility: OTHER | Age: 48
End: 2023-04-26
Attending: NURSE PRACTITIONER
Payer: COMMERCIAL

## 2023-04-26 VITALS
HEIGHT: 62 IN | BODY MASS INDEX: 25.95 KG/M2 | OXYGEN SATURATION: 97 % | SYSTOLIC BLOOD PRESSURE: 126 MMHG | DIASTOLIC BLOOD PRESSURE: 72 MMHG | WEIGHT: 141 LBS | HEART RATE: 62 BPM | TEMPERATURE: 97.1 F

## 2023-04-26 DIAGNOSIS — J30.1 ALLERGIC RHINITIS DUE TO POLLEN, UNSPECIFIED SEASONALITY: ICD-10-CM

## 2023-04-26 DIAGNOSIS — J32.9 CHRONIC RECURRENT SINUSITIS: Primary | ICD-10-CM

## 2023-04-26 PROCEDURE — 31231 NASAL ENDOSCOPY DX: CPT | Performed by: NURSE PRACTITIONER

## 2023-04-26 PROCEDURE — 99214 OFFICE O/P EST MOD 30 MIN: CPT | Mod: 25 | Performed by: NURSE PRACTITIONER

## 2023-04-26 ASSESSMENT — PAIN SCALES - GENERAL: PAINLEVEL: NO PAIN (0)

## 2023-04-26 NOTE — PROGRESS NOTES
Otolaryngology Note         Chief Complaint:     Patient presents with:  Consult: Acute Recurrent Pansinusitis, Nasal Congestion; Referred by Dr. Moctezuma           History of Present Illness:     Mallorie Machado is a 47 year old female seen today for recurrent sinusitis.      Symptoms include nasal congestion, facial pain and pressure.  Unable to blow nose.  Blowing bright red blood from her nose, sore throat.  No fevers.  She has been treated with azithromycin and doxycycline.     Symptoms have been present for many years and are worsening.   She had 2-3 sinus infections in the past year.  Usually she only gets 1 per year for many years in a row.  She was started on Singulair and daily Zyrtec and has noted improvement.     She had recurrent ear infections as a child.  No ear surgery.   No ENT surgery    No chronic cough  No shortness of breath    No dysphagia  Rash/sensitive skin - +  + history of seasonal allergies - worse in the fall, walking in the woods during deer season is worst time  No history of previous allergy testing  Recommend allergy testing  + family history of allergies, tonsillitis - sister had PE tubes      Resides in a house with a basement.  There is water or mold, in the basement, water leak.  There is carpet in the bedroom.  new carpet  Heat source in home: nori thermal forced air with clean filters  Pets: 2 dogs, 2 cats. Cats spend most of the time outside.  One cat sleeps with her         Medications:     Current Outpatient Rx   Medication Sig Dispense Refill     Cetirizine HCl (ZYRTEC ALLERGY PO)        levothyroxine (SYNTHROID/LEVOTHROID) 125 MCG tablet Take 1 tablet (125 mcg) by mouth daily 90 tablet 4     montelukast (SINGULAIR) 10 MG tablet Take 1 tablet (10 mg) by mouth nightly as needed (allergies) 90 tablet 0            Allergies:     Allergies: Amoxil [amoxicillin], Hydrocodone-acetaminophen, Meperidine, Morphine, Oxycodone-acetaminophen, and Propoxyphene n-apap          Past Medical  History:     Past Medical History:   Diagnosis Date     Allergic rhinitis due to pollen     No Comments Provided     Cyst of ovary     1994     Dermatitis     scalp     Dysmenorrhea     No Comments Provided     Dysthymic disorder     No Comments Provided     Endometriosis     No Comments Provided     Hypothyroidism     No Comments Provided     Injury of lower back     02/07,Cervical disc injury, C5-6, with improvement (work comp related)     Low back pain     No Comments Provided     Obesity     No Comments Provided     Other cervical disc degeneration, unspecified cervical region     No Comments Provided     Other cervical disc displacement, unspecified cervical region     No Comments Provided     Personal history of other diseases of the female genital tract     G2, P2-0-0-2, vaginal deliveries     Uncomplicated asthma     No Comments Provided            Past Surgical History:     Past Surgical History:   Procedure Laterality Date     ATTEMPTED ARTHROSCOPY      1/20/06,left wrist arthroscopy with debridement - Dr. Mahmood, [facility]     FUSION CERVICAL ANTERIOR ONE LEVEL      2008,C5-6     HYSTERECTOMY TOTAL ABDOMINAL, BILATERAL SALPINGO-OOPHORECTOMY, COMBINED      11/04,bilateral salpingo-oophorectomy     LAPAROSCOPY DIAGNOSTIC (GENERAL)      6/04,02/07     OTHER SURGICAL HISTORY       x 2,LXY396,VAGINAL DELIVERY     OTHER SURGICAL HISTORY      1/19/2012,205448,REMOVAL OF FOREIGN BODY,ACDF C4-5 and C6-7 with hardware removal [Other][[[       ENT family history reviewed         Social History:     Social History     Tobacco Use     Smoking status: Every Day     Packs/day: 0.50     Years: 24.00     Pack years: 12.00     Types: Cigarettes     Smokeless tobacco: Never   Vaping Use     Vaping status: Never Used     Passive vaping exposure: Yes   Substance Use Topics     Alcohol use: Not Currently     Alcohol/week: 0.0 standard drinks of alcohol     Drug use: Never            Review of Systems:     ROS: See HPI      "    Physical Exam:     /72 (Cuff Size: Adult Regular)   Pulse 62   Temp 97.1  F (36.2  C) (Tympanic)   Ht 1.575 m (5' 2\")   Wt 64 kg (141 lb)   LMP  (LMP Unknown)   SpO2 97%   BMI 25.79 kg/m      General - The patient is well nourished and well developed, and appears to have good nutritional status.   Head and Face - Normocephalic and atraumatic, with no gross asymmetry noted.  The facial nerve is intact, with strong symmetric movements.  Voice and Breathing - The patient was breathing comfortably without the use of accessory muscles. There was no wheezing, stridor, or stertor.  The patients voice was clear and strong, and had appropriate pitch and quality.  Ears -The external auditory canals are patent, the tympanic membranes are intact without effusion, retraction or mass.  Bony landmarks are intact.  Eyes - Extraocular movements intact, sclera were not icteric or injected, conjunctiva were pink and moist.  Mouth - Examination of the oral cavity showed pink, healthy oral mucosa. No lesions or ulcerations noted.  The tongue was mobile and midline, and the dentition were in good condition.    Throat - The walls of the oropharynx were smooth, pink, moist, symmetric, and had no lesions or ulcerations.  The tonsillar pillars and soft palate were symmetric.  The uvula was midline on elevation.  Neck - No worrisome lymphadenopathy.   Palpation of the thyroid was soft and smooth, with no nodules or goiter appreciated.  The trachea was mobile and midline.  Nose - External contour is symmetric, no gross deflection or scars.  The nasal passages are examined with nasal speculum.   Nasal mucosa is pink and moist with no abnormal mucus.  The septum was intact and the turbinates are examined with nasal speculum, no polyps, masses, or purulence noted on examination of anterior nasal cavity.     To evaluate the nose and sinuses, I performed rigid nasal endoscopy.  I sprayed both nares with 2 sprays lidocaine and " neosynephrine.     I began with the RIGHT side using a 0 degree rigid nasal endoscope, and then similarly examined the LEFT side     Findings:  Left septal spur with left DNS  Inferior turbinates:  enlarged   Middle turbinate and middle meatus:  enlarged  Narrow nasal cavity bilaterally  Superior meatus is examined and unremarkable  Unable to view SER bilaterally due to enlarged turbinates bilateraly  Mucosa is edematous without polyps or purulence  Unable to view NP  The patient tolerated the procedure well            Assessment and Plan:       ICD-10-CM    1. Chronic recurrent sinusitis  J32.9 CT Sinus w/o Contrast      2. Allergic rhinitis due to pollen, unspecified seasonality  J30.1         Complete Allergy Skin Testing  Complete Sinus CT Scan  Use Osiel Med Nasal Saline  Continue Zyrtec and Singulair  Follow up with Rachel Nicole after allergy test    Osiel Med Nasal Saline  Use high volume osiel med saline irrigation.  Use warm distilled water and 2 packets of the salt solution that comes with the bottle, dissolve in bottle up to 240 ml sari.  Irrigate each side of your nose leaning over the sink, using 1/3 to 1/2 the volume of the bottle in each nostril every irrigation.  Irrigate 52times daily and as needed.    Indications for allergy testing include:   1) Confirm suspicion of allergic rhinitis due to inhalant allergies  2) Identify the offending allergen to determine specific mode of treatment  3) In the case of chronic rhinosinusitis: when symptoms are not controlled by avoidance and pharmacotherapy  4) In the Asthma patient when exacerbations may be due to perennial allergen exposure  5) Suspect food allergy  6) Otitis Media, chronic rhinitis, atopic dermatitis, Meniere disease, headache, pharyngitis or eye symptoms    Modified quantitative testing (MQT) will be performed.  Signed consent was obtained, and the risks of immunotherapy were discussed, including the potential for anaphylaxis.    If immunotherapy  (IT) is recommended, there is continued risk of anaphylaxis.   Anaphylaxis can cause death. The patient will need to be monitored for 30 minutes post injection.  They must present their epinephrine pen prior to injection.  Subcutaneous as well as sublingual immunotherapy (SLIT) were discussed as potential treatment options.  The patient was told SLIT is not approved by the FDA and is cash pay.  The general time frame of immunotherapy was discussed (generally 3-5 years, sometimes longer), and the basic immunology behind IT was discussed.    Antonia Nicole NP-C  [facility]

## 2023-04-26 NOTE — PATIENT INSTRUCTIONS
Thank you for allowing Antonia Nicole NP and our ENT team to participate in your care.  If your medications are too expensive, please give the nurse a call.  We can possibly change this medication.  If you have a scheduling or an appointment question please contact our Health Unit Coordinator at their direct line [phone].    Complete Allergy Skin Testing  Complete Sinus CT Scan  Use Osiel Med Nasal Saline  Continue Zyrtec and Singulair  Follow up with Rachel Nicole after allergy test    Osiel Med Nasal Saline  Use high volume osiel med saline irrigation.  Use warm distilled water and 2 packets of the salt solution that comes with the bottle, dissolve in bottle up to 240 ml sari.  Irrigate each side of your nose leaning over the sink, using 1/3 to 1/2 the volume of the bottle in each nostril every irrigation.  Irrigate 52times daily and as needed.    Indications for allergy testing include:   1) Confirm suspicion of allergic rhinitis due to inhalant allergies  2) Identify the offending allergen to determine specific mode of treatment  3) In the case of chronic rhinosinusitis: when symptoms are not controlled by avoidance and pharmacotherapy  4) In the Asthma patient when exacerbations may be due to perennial allergen exposure  5) Suspect food allergy  6) Otitis Media, chronic rhinitis, atopic dermatitis, Meniere disease, headache, pharyngitis or eye symptoms    Modified quantitative testing (MQT) will be performed.  Signed consent was obtained, and the risks of immunotherapy were discussed, including the potential for anaphylaxis.    If immunotherapy (IT) is recommended, there is continued risk of anaphylaxis.   Anaphylaxis can cause death. The patient will need to be monitored for 30 minutes post injection.  They must present their epinephrine pen prior to injection.  Subcutaneous as well as sublingual immunotherapy (SLIT) were discussed as potential treatment options.  The patient was told SLIT is not approved  by the FDA and is cash pay.  The general time frame of immunotherapy was discussed (generally 3-5 years, sometimes longer), and the basic immunology behind IT was discussed.

## 2023-04-26 NOTE — LETTER
4/26/2023         RE: Mallorie Machado  [address] [facility] [id number]        Dear Colleague,    Thank you for referring your patient, Mallorie Machado, to the [facility]. Please see a copy of my visit note below.    Otolaryngology Note         Chief Complaint:     Patient presents with:  Consult: Acute Recurrent Pansinusitis, Nasal Congestion; Referred by Dr. Moctezuma           History of Present Illness:     Mallorie Machado is a 47 year old female seen today for recurrent sinusitis.      Symptoms include nasal congestion, facial pain and pressure.  Unable to blow nose.  Blowing bright red blood from her nose, sore throat.  No fevers.  She has been treated with azithromycin and doxycycline.     Symptoms have been present for many years and are worsening.   She had 2-3 sinus infections in the past year.  Usually she only gets 1 per year for many years in a row.  She was started on Singulair and daily Zyrtec and has noted improvement.     She had recurrent ear infections as a child.  No ear surgery.   No ENT surgery    No chronic cough  No shortness of breath    No dysphagia  Rash/sensitive skin - +  + history of seasonal allergies - worse in the fall, walking in the woods during deer season is worst time  No history of previous allergy testing  Recommend allergy testing  + family history of allergies, tonsillitis - sister had PE tubes      Resides in a house with a basement.  There is water or mold, in the basement, water leak.  There is carpet in the bedroom.  new carpet  Heat source in home: nori thermal forced air with clean filters  Pets: 2 dogs, 2 cats. Cats spend most of the time outside.  One cat sleeps with her         Medications:     Current Outpatient Rx   Medication Sig Dispense Refill     Cetirizine HCl (ZYRTEC ALLERGY PO)        levothyroxine (SYNTHROID/LEVOTHROID) 125 MCG tablet Take 1 tablet (125 mcg) by mouth daily 90 tablet 4     montelukast (SINGULAIR) 10 MG  tablet Take 1 tablet (10 mg) by mouth nightly as needed (allergies) 90 tablet 0            Allergies:     Allergies: Amoxil [amoxicillin], Hydrocodone-acetaminophen, Meperidine, Morphine, Oxycodone-acetaminophen, and Propoxyphene n-apap          Past Medical History:     Past Medical History:   Diagnosis Date     Allergic rhinitis due to pollen     No Comments Provided     Cyst of ovary     1994     Dermatitis     scalp     Dysmenorrhea     No Comments Provided     Dysthymic disorder     No Comments Provided     Endometriosis     No Comments Provided     Hypothyroidism     No Comments Provided     Injury of lower back     02/07,Cervical disc injury, C5-6, with improvement (work comp related)     Low back pain     No Comments Provided     Obesity     No Comments Provided     Other cervical disc degeneration, unspecified cervical region     No Comments Provided     Other cervical disc displacement, unspecified cervical region     No Comments Provided     Personal history of other diseases of the female genital tract     G2, P2-0-0-2, vaginal deliveries     Uncomplicated asthma     No Comments Provided            Past Surgical History:     Past Surgical History:   Procedure Laterality Date     ATTEMPTED ARTHROSCOPY      1/20/06,left wrist arthroscopy with debridement - Dr. Mahmood, [facility]     FUSION CERVICAL ANTERIOR ONE LEVEL      2008,C5-6     HYSTERECTOMY TOTAL ABDOMINAL, BILATERAL SALPINGO-OOPHORECTOMY, COMBINED      11/04,bilateral salpingo-oophorectomy     LAPAROSCOPY DIAGNOSTIC (GENERAL)      6/04,02/07     OTHER SURGICAL HISTORY       x 2,XVR124,VAGINAL DELIVERY     OTHER SURGICAL HISTORY      1/19/2012,205448,REMOVAL OF FOREIGN BODY,ACDF C4-5 and C6-7 with hardware removal [Other][[[       ENT family history reviewed         Social History:     Social History     Tobacco Use     Smoking status: Every Day     Packs/day: 0.50     Years: 24.00     Pack years: 12.00     Types: Cigarettes     Smokeless  "tobacco: Never   Vaping Use     Vaping status: Never Used     Passive vaping exposure: Yes   Substance Use Topics     Alcohol use: Not Currently     Alcohol/week: 0.0 standard drinks of alcohol     Drug use: Never            Review of Systems:     ROS: See HPI         Physical Exam:     /72 (Cuff Size: Adult Regular)   Pulse 62   Temp 97.1  F (36.2  C) (Tympanic)   Ht 1.575 m (5' 2\")   Wt 64 kg (141 lb)   LMP  (LMP Unknown)   SpO2 97%   BMI 25.79 kg/m      General - The patient is well nourished and well developed, and appears to have good nutritional status.   Head and Face - Normocephalic and atraumatic, with no gross asymmetry noted.  The facial nerve is intact, with strong symmetric movements.  Voice and Breathing - The patient was breathing comfortably without the use of accessory muscles. There was no wheezing, stridor, or stertor.  The patients voice was clear and strong, and had appropriate pitch and quality.  Ears -The external auditory canals are patent, the tympanic membranes are intact without effusion, retraction or mass.  Bony landmarks are intact.  Eyes - Extraocular movements intact, sclera were not icteric or injected, conjunctiva were pink and moist.  Mouth - Examination of the oral cavity showed pink, healthy oral mucosa. No lesions or ulcerations noted.  The tongue was mobile and midline, and the dentition were in good condition.    Throat - The walls of the oropharynx were smooth, pink, moist, symmetric, and had no lesions or ulcerations.  The tonsillar pillars and soft palate were symmetric.  The uvula was midline on elevation.  Neck - No worrisome lymphadenopathy.   Palpation of the thyroid was soft and smooth, with no nodules or goiter appreciated.  The trachea was mobile and midline.  Nose - External contour is symmetric, no gross deflection or scars.  The nasal passages are examined with nasal speculum.   Nasal mucosa is pink and moist with no abnormal mucus.  The septum was " intact and the turbinates are examined with nasal speculum, no polyps, masses, or purulence noted on examination of anterior nasal cavity.     To evaluate the nose and sinuses, I performed rigid nasal endoscopy.  I sprayed both nares with 2 sprays lidocaine and neosynephrine.     I began with the RIGHT side using a 0 degree rigid nasal endoscope, and then similarly examined the LEFT side     Findings:  Left septal spur with left DNS  Inferior turbinates:  enlarged   Middle turbinate and middle meatus:  enlarged  Narrow nasal cavity bilaterally  Superior meatus is examined and unremarkable  Unable to view SER bilaterally due to enlarged turbinates bilateraly  Mucosa is edematous without polyps or purulence  Unable to view NP  The patient tolerated the procedure well            Assessment and Plan:       ICD-10-CM    1. Chronic recurrent sinusitis  J32.9 CT Sinus w/o Contrast      2. Allergic rhinitis due to pollen, unspecified seasonality  J30.1         Complete Allergy Skin Testing  Complete Sinus CT Scan  Use Osiel Med Nasal Saline  Continue Zyrtec and Singulair  Follow up with Rachel Nicole after allergy test    Osiel Med Nasal Saline  Use high volume osiel med saline irrigation.  Use warm distilled water and 2 packets of the salt solution that comes with the bottle, dissolve in bottle up to 240 ml sari.  Irrigate each side of your nose leaning over the sink, using 1/3 to 1/2 the volume of the bottle in each nostril every irrigation.  Irrigate 52times daily and as needed.    Indications for allergy testing include:   1) Confirm suspicion of allergic rhinitis due to inhalant allergies  2) Identify the offending allergen to determine specific mode of treatment  3) In the case of chronic rhinosinusitis: when symptoms are not controlled by avoidance and pharmacotherapy  4) In the Asthma patient when exacerbations may be due to perennial allergen exposure  5) Suspect food allergy  6) Otitis Media, chronic rhinitis, atopic  dermatitis, Meniere disease, headache, pharyngitis or eye symptoms    Modified quantitative testing (MQT) will be performed.  Signed consent was obtained, and the risks of immunotherapy were discussed, including the potential for anaphylaxis.    If immunotherapy (IT) is recommended, there is continued risk of anaphylaxis.   Anaphylaxis can cause death. The patient will need to be monitored for 30 minutes post injection.  They must present their epinephrine pen prior to injection.  Subcutaneous as well as sublingual immunotherapy (SLIT) were discussed as potential treatment options.  The patient was told SLIT is not approved by the FDA and is cash pay.  The general time frame of immunotherapy was discussed (generally 3-5 years, sometimes longer), and the basic immunology behind IT was discussed.    Antonia JOSHI  [facility]        Again, thank you for allowing me to participate in the care of your patient.        Sincerely,        Antonia Nicole NP

## 2023-04-26 NOTE — NURSING NOTE
Went over instructions with patient for allergy skin testing.  Reviewed patients current medications and patient will avoid all contraindicated medications prior to MQT testing.  Patient verbalizes understanding.  Copy of allergy testing packet given to the patient.  Patient brought to the scheduling desk to get scheduled.  She is advised to call if she has any questions.    Kandy Lopez RN on 4/26/2023 at 10:02 AM

## 2023-05-14 ASSESSMENT — ENCOUNTER SYMPTOMS
CHILLS: 0
MYALGIAS: 0
SORE THROAT: 0
ABDOMINAL PAIN: 0
COUGH: 0
HEMATURIA: 0
PARESTHESIAS: 0
PALPITATIONS: 0
NAUSEA: 0
CONSTIPATION: 0
FEVER: 0
HEARTBURN: 0
DYSURIA: 0
DIZZINESS: 0
FREQUENCY: 0
ARTHRALGIAS: 1
HEADACHES: 0
BREAST MASS: 0
EYE PAIN: 0
JOINT SWELLING: 1
NERVOUS/ANXIOUS: 0
DIARRHEA: 0
SHORTNESS OF BREATH: 0
HEMATOCHEZIA: 0
WEAKNESS: 0

## 2023-05-18 ENCOUNTER — HOSPITAL ENCOUNTER (OUTPATIENT)
Dept: GENERAL RADIOLOGY | Facility: OTHER | Age: 48
Discharge: HOME | End: 2023-05-18
Attending: FAMILY MEDICINE
Payer: COMMERCIAL

## 2023-05-18 ENCOUNTER — OFFICE VISIT (OUTPATIENT)
Dept: FAMILY MEDICINE | Facility: OTHER | Age: 48
End: 2023-05-18
Attending: FAMILY MEDICINE
Payer: COMMERCIAL

## 2023-05-18 VITALS
BODY MASS INDEX: 25.27 KG/M2 | DIASTOLIC BLOOD PRESSURE: 72 MMHG | OXYGEN SATURATION: 96 % | RESPIRATION RATE: 18 BRPM | HEART RATE: 63 BPM | HEIGHT: 63 IN | TEMPERATURE: 97.5 F | SYSTOLIC BLOOD PRESSURE: 110 MMHG | WEIGHT: 142.6 LBS

## 2023-05-18 DIAGNOSIS — Z12.11 SCREEN FOR COLON CANCER: ICD-10-CM

## 2023-05-18 DIAGNOSIS — M23.91 INTERNAL DERANGEMENT OF KNEE, RIGHT: ICD-10-CM

## 2023-05-18 DIAGNOSIS — Z11.4 SCREENING FOR HIV (HUMAN IMMUNODEFICIENCY VIRUS): ICD-10-CM

## 2023-05-18 DIAGNOSIS — Z00.00 ROUTINE GENERAL MEDICAL EXAMINATION AT A HEALTH CARE FACILITY: Primary | ICD-10-CM

## 2023-05-18 DIAGNOSIS — Z11.59 NEED FOR HEPATITIS C SCREENING TEST: ICD-10-CM

## 2023-05-18 DIAGNOSIS — E03.9 HYPOTHYROIDISM, UNSPECIFIED TYPE: ICD-10-CM

## 2023-05-18 DIAGNOSIS — Z12.31 VISIT FOR SCREENING MAMMOGRAM: ICD-10-CM

## 2023-05-18 LAB — TSH SERPL DL<=0.005 MIU/L-ACNC: 0.13 UIU/ML (ref 0.3–4.2)

## 2023-05-18 PROCEDURE — 87389 HIV-1 AG W/HIV-1&-2 AB AG IA: CPT | Mod: ZL | Performed by: FAMILY MEDICINE

## 2023-05-18 PROCEDURE — 90677 PCV20 VACCINE IM: CPT | Performed by: FAMILY MEDICINE

## 2023-05-18 PROCEDURE — 90715 TDAP VACCINE 7 YRS/> IM: CPT | Performed by: FAMILY MEDICINE

## 2023-05-18 PROCEDURE — 73562 X-RAY EXAM OF KNEE 3: CPT | Mod: RT

## 2023-05-18 PROCEDURE — 90472 IMMUNIZATION ADMIN EACH ADD: CPT | Performed by: FAMILY MEDICINE

## 2023-05-18 PROCEDURE — 36415 COLL VENOUS BLD VENIPUNCTURE: CPT | Mod: ZL | Performed by: FAMILY MEDICINE

## 2023-05-18 PROCEDURE — 90471 IMMUNIZATION ADMIN: CPT | Performed by: FAMILY MEDICINE

## 2023-05-18 PROCEDURE — 99396 PREV VISIT EST AGE 40-64: CPT | Mod: 25 | Performed by: FAMILY MEDICINE

## 2023-05-18 PROCEDURE — 86803 HEPATITIS C AB TEST: CPT | Mod: ZL | Performed by: FAMILY MEDICINE

## 2023-05-18 PROCEDURE — 84443 ASSAY THYROID STIM HORMONE: CPT | Mod: ZL | Performed by: FAMILY MEDICINE

## 2023-05-18 ASSESSMENT — ENCOUNTER SYMPTOMS
BREAST MASS: 0
SORE THROAT: 0
HEARTBURN: 0
HEMATOCHEZIA: 0
PALPITATIONS: 0
FEVER: 0
NAUSEA: 0
EYE PAIN: 0
ARTHRALGIAS: 1
FREQUENCY: 0
DIARRHEA: 0
CHILLS: 0
PARESTHESIAS: 0
SHORTNESS OF BREATH: 0
MYALGIAS: 0
DYSURIA: 0
CONSTIPATION: 0
COUGH: 0
HEADACHES: 0
NERVOUS/ANXIOUS: 0
DIZZINESS: 0
JOINT SWELLING: 1
ABDOMINAL PAIN: 0
WEAKNESS: 0
HEMATURIA: 0

## 2023-05-18 ASSESSMENT — PAIN SCALES - GENERAL: PAINLEVEL: NO PAIN (0)

## 2023-05-18 NOTE — NURSING NOTE
"Chief Complaint   Patient presents with     Physical       Initial /72   Pulse 63   Temp 97.5  F (36.4  C) (Tympanic)   Resp 18   Ht 1.594 m (5' 2.75\")   Wt 64.7 kg (142 lb 9.6 oz)   LMP  (LMP Unknown)   SpO2 96%   BMI 25.46 kg/m   Estimated body mass index is 25.46 kg/m  as calculated from the following:    Height as of this encounter: 1.594 m (5' 2.75\").    Weight as of this encounter: 64.7 kg (142 lb 9.6 oz).  Medication Reconciliation: complete    FOOD SECURITY SCREENING QUESTIONS  Hunger Vital Signs:  Within the past 12 months we worried whether our food would run out before we got money to buy more. Never  Within the past 12 months the food we bought just didn't last and we didn't have money to get more. Never  Antonia Bishop LPN 5/18/2023 11:00 AM      "

## 2023-05-18 NOTE — PROGRESS NOTES
SUBJECTIVE:   CC: Mallorie is an 47 year old who presents for preventive health visit.       5/18/2023    10:57 AM   Additional Questions   Roomed by JOCELYN Knight   Accompanied by Self         5/18/2023    10:57 AM   Patient Reported Additional Medications   Patient reports taking the following new medications N/A     Patient has been advised of split billing requirements and indicates understanding: Yes  Healthy Habits:     Getting at least 3 servings of Calcium per day:  Yes    Bi-annual eye exam:  Yes    Dental care twice a year:  NO    Sleep apnea or symptoms of sleep apnea:  Daytime drowsiness and Excessive snoring    Diet:  Carbohydrate counting    Frequency of exercise:  4-5 days/week    Duration of exercise:  45-60 minutes    Taking medications regularly:  Yes    Medication side effects:  None    PHQ-2 Total Score: 0    Additional concerns today:  No                  Today's PHQ-2 Score:       5/18/2023     9:55 AM   PHQ-2 ( 1999 [employer])   Q1: Little interest or pleasure in doing things 0   Q2: Feeling down, depressed or hopeless 0   PHQ-2 Score 0   Q1: Little interest or pleasure in doing things Not at all   Q2: Feeling down, depressed or hopeless Not at all   PHQ-2 Score 0           Social History     Tobacco Use     Smoking status: Every Day     Packs/day: 0.50     Years: 24.00     Pack years: 12.00     Types: Cigarettes     Smokeless tobacco: Never   Vaping Use     Vaping status: Never Used     Passive vaping exposure: Yes   Substance Use Topics     Alcohol use: Not Currently     Alcohol/week: 0.0 standard drinks of alcohol             5/14/2023     7:46 PM   Alcohol Use   Prescreen: >3 drinks/day or >7 drinks/week? No     Reviewed orders with patient.  Reviewed health maintenance and updated orders accordingly - Yes  Labs reviewed in [facility]  Current Outpatient Medications   Medication Sig Dispense Refill     Cetirizine HCl (ZYRTEC ALLERGY PO)        levothyroxine (SYNTHROID/LEVOTHROID) 125 MCG tablet  Take 1 tablet (125 mcg) by mouth daily 90 tablet 4     montelukast (SINGULAIR) 10 MG tablet Take 1 tablet (10 mg) by mouth nightly as needed (allergies) 90 tablet 0     Allergies   Allergen Reactions     Amoxil [Amoxicillin] Other (See Comments), Nausea and Vomiting and GI Disturbance     Lethargic     Hydrocodone-Acetaminophen Itching and Nausea     Meperidine Itching     No hives     Morphine      Other reaction(s): Erythema  Face & hands     Oxycodone-Acetaminophen Itching     Propoxyphene N-Apap      Other reaction(s): Throat Swelling/Closing       Breast Cancer Screenin/14/2023     7:46 PM   Breast CA Risk Assessment (FHS-7)   Do you have a family history of breast, colon, or ovarian cancer? No / Unknown         Mammogram Screening: Recommended annual mammography  Pertinent mammograms are reviewed under the imaging tab.    History of abnormal Pap smear: Status post benign hysterectomy. Health Maintenance and Surgical History updated.      Latest Ref Rng & Units 2022    11:41 AM   PAP / HPV   PAP  Negative for Intraepithelial Lesion or Malignancy (NILM)     HPV 16 DNA Negative Negative     HPV 18 DNA Negative Negative     Other HR HPV Negative Negative       Reviewed and updated as needed this visit by clinical staff   Tobacco  Allergies  Meds   Med Hx  Surg Hx  Fam Hx  Soc Hx        Reviewed and updated as needed this visit by Provider                 Past Medical History:   Diagnosis Date     Allergic rhinitis due to pollen     No Comments Provided     Cyst of ovary          Dermatitis     scalp     Dysmenorrhea     No Comments Provided     Dysthymic disorder     No Comments Provided     Endometriosis     No Comments Provided     Hypothyroidism     No Comments Provided     Injury of lower back     ,Cervical disc injury, C5-6, with improvement (work comp related)     Low back pain     No Comments Provided     Obesity     No Comments Provided     Other cervical disc degeneration,  unspecified cervical region     No Comments Provided     Other cervical disc displacement, unspecified cervical region     No Comments Provided     Personal history of other diseases of the female genital tract     G2, P2-0-0-2, vaginal deliveries     Uncomplicated asthma     No Comments Provided      Past Surgical History:   Procedure Laterality Date     ATTEMPTED ARTHROSCOPY      1/20/06,left wrist arthroscopy with debridement - Dr. Mahmood, [facility]     FUSION CERVICAL ANTERIOR ONE LEVEL      2008,C5-6     HYSTERECTOMY TOTAL ABDOMINAL, BILATERAL SALPINGO-OOPHORECTOMY, COMBINED      11/04,bilateral salpingo-oophorectomy     LAPAROSCOPY DIAGNOSTIC (GENERAL)      6/04,02/07     OTHER SURGICAL HISTORY       x 2,SIH448,VAGINAL DELIVERY     OTHER SURGICAL HISTORY      1/19/2012,205448,REMOVAL OF FOREIGN BODY,ACDF C4-5 and C6-7 with hardware removal [Other][[[       Review of Systems   Constitutional: Negative for chills and fever.   HENT: Negative for congestion, ear pain, hearing loss and sore throat.    Eyes: Negative for pain and visual disturbance.   Respiratory: Negative for cough and shortness of breath.    Cardiovascular: Negative for chest pain and palpitations.   Gastrointestinal: Negative for abdominal pain, constipation, diarrhea, heartburn, hematochezia and nausea.   Breasts:  Negative for tenderness, breast mass and discharge.   Genitourinary: Negative for dysuria, frequency, genital sores, hematuria, pelvic pain, urgency, vaginal bleeding and vaginal discharge.   Musculoskeletal: Positive for arthralgias and joint swelling. Negative for myalgias.   Skin: Negative for rash.   Neurological: Negative for dizziness, weakness, headaches and paresthesias.   Psychiatric/Behavioral: Negative for mood changes. The patient is not nervous/anxious.      Has swelling in the right knee with significant weather changes. Cannot fully extend it for a few days. Will frankly lock.  Only in the winter. Prior injury,  "years ago.          OBJECTIVE:   /72   Pulse 63   Temp 97.5  F (36.4  C) (Tympanic)   Resp 18   Ht 1.594 m (5' 2.75\")   Wt 64.7 kg (142 lb 9.6 oz)   LMP  (LMP Unknown)   SpO2 96%   BMI 25.46 kg/m    Physical Exam  GENERAL APPEARANCE: healthy, alert and no distress  EYES: Eyes grossly normal to inspection, PERRL and conjunctivae and sclerae normal  HENT: ear canals and TM's normal, nose and mouth without ulcers or lesions, oropharynx clear and oral mucous membranes moist  NECK: no adenopathy, no asymmetry, masses, or scars and thyroid normal to palpation  RESP: lungs clear to auscultation - no rales, rhonchi or wheezes  CV: regular rate and rhythm, normal S1 S2, no S3 or S4, no murmur, click or rub, no peripheral edema and peripheral pulses strong  ABDOMEN: soft, nontender, no hepatosplenomegaly, no masses and bowel sounds normal  MS: no musculoskeletal defects are noted and gait is age appropriate without ataxia. Right knee without pain, no effusion. Stable on varus and valgus stressing. Positive Nazia's, laterally.   SKIN: no suspicious lesions or rashes  NEURO: Normal strength and tone, sensory exam grossly normal, mentation intact and speech normal  PSYCH: mentation appears normal and affect normal/bright    Diagnostic Test Results:  Labs reviewed in Epic  Results for orders placed or performed during the hospital encounter of 05/18/23   XR Knee Right 3 Views     Status: None    Narrative    Exam: XR KNEE RIGHT 3 VIEWS     History:Female, age 47 years, Internal derangement of knee, right    Comparison:  No relevant prior imaging.    Technique: Three views are submitted.    Findings: Bones are normally mineralized. No evidence of acute or  subacute fracture.  No evidence of dislocation.           Impression    Impression:  No evidence of acute or subacute bony abnormality.     Small enthesophyte at the quadriceps tendon insertion.    AMANDA SANTANA MD         SYSTEM ID:  [id number]   Results for " "orders placed or performed in visit on 05/18/23   Thyrotropin GH     Status: Abnormal   Result Value Ref Range    TSH 0.13 (L) 0.30 - 4.20 uIU/mL       ASSESSMENT/PLAN:       ICD-10-CM    1. Routine general medical examination at a health care facility  Z00.00       2. Screen for colon cancer  Z12.11 Colonoscopy Screening  Referral      3. Screening for HIV (human immunodeficiency virus)  Z11.4 HIV Screening      4. Need for hepatitis C screening test  Z11.59 Hepatitis C Screen Reflex to HCV RNA Quant and Genotype      5. Hypothyroidism, unspecified type  E03.9 Thyrotropin GH      6. Visit for screening mammogram  Z12.31 MA SCREENING DIGITAL BILAT - Future  (s+30)      7. Internal derangement of knee, right  M23.91 XR Knee Right 3 Views     MR Knee Right w/o Contrast        I suspect a meniscal tear and/or loose body in the knee. Will get an MRI and likely, based on that, ortho consult. She understands.      TSH is mildly low, has had issues with taking her meds consistently, making dosing harder. For now, instead f reducing her dose, will have her just hold the med once a week.         COUNSELING:  Reviewed preventive health counseling, as reflected in patient instructions       Regular exercise       Healthy diet/nutrition      BMI:   Estimated body mass index is 25.46 kg/m  as calculated from the following:    Height as of this encounter: 1.594 m (5' 2.75\").    Weight as of this encounter: 64.7 kg (142 lb 9.6 oz).   Weight management plan: Discussed healthy diet and exercise guidelines      She reports that she has been smoking cigarettes. She has a 12.00 pack-year smoking history. She has never used smokeless tobacco.  Nicotine/Tobacco Cessation Plan:   Information offered: Patient not interested at this time          Jerardo Navarrete MD  [facility]  "

## 2023-05-19 LAB
HCV AB SERPL QL IA: NONREACTIVE
HIV 1+2 AB+HIV1 P24 AG SERPL QL IA: NONREACTIVE

## 2023-05-22 DIAGNOSIS — Z12.11 ENCOUNTER FOR SCREENING COLONOSCOPY: Primary | ICD-10-CM

## 2023-05-22 RX ORDER — POLYETHYLENE GLYCOL 3350, SODIUM CHLORIDE, SODIUM BICARBONATE, POTASSIUM CHLORIDE 420; 11.2; 5.72; 1.48 G/4L; G/4L; G/4L; G/4L
4000 POWDER, FOR SOLUTION ORAL ONCE
Qty: 4000 ML | Refills: 0 | Status: SHIPPED | OUTPATIENT
Start: 2023-08-21 | End: 2023-08-21

## 2023-05-22 RX ORDER — BISACODYL 5 MG/1
TABLET, DELAYED RELEASE ORAL
Qty: 2 TABLET | Refills: 0 | Status: ON HOLD | OUTPATIENT
Start: 2023-08-21 | End: 2023-08-28

## 2023-05-22 NOTE — TELEPHONE ENCOUNTER
Screening Questions for the Scheduling of Screening Colonoscopies   (If Colonoscopy is diagnostic, Provider should review the chart before scheduling.)  Are you younger than 50 or older than 80?  YES  Do you take aspirin or fish oil?  NO (if yes, tell patient to stop 1 week prior to Colonoscopy)  Do you take warfarin (Coumadin), clopidogrel (Plavix), apixaban (Eliquis), dabigatram (Pradaxa), rivaroxaban (Xarelto) or any blood thinner? NO  Do you use oxygen at home?  NO  Do you have kidney disease? NO  Are you on dialysis? NO  Have you had a stroke or heart attack in the last year? NO  Have you had a stent in your heart or any blood vessel in the last year? NO  Have you had a transplant of any organ? NO  Have you had a colonoscopy or upper endoscopy (EGD) before? NO  Date of scheduled Colonoscopy. 08/28/2023  Provider SAMMY  [facility]

## 2023-05-25 ENCOUNTER — MYC MEDICAL ADVICE (OUTPATIENT)
Dept: FAMILY MEDICINE | Facility: OTHER | Age: 48
End: 2023-05-25
Payer: COMMERCIAL

## 2023-05-25 ENCOUNTER — HOSPITAL ENCOUNTER (OUTPATIENT)
Dept: MRI IMAGING | Facility: OTHER | Age: 48
Discharge: HOME | End: 2023-05-25
Attending: FAMILY MEDICINE | Admitting: FAMILY MEDICINE
Payer: COMMERCIAL

## 2023-05-25 DIAGNOSIS — M23.91 INTERNAL DERANGEMENT OF KNEE, RIGHT: ICD-10-CM

## 2023-05-25 PROCEDURE — 73721 MRI JNT OF LWR EXTRE W/O DYE: CPT | Mod: RT

## 2023-05-26 DIAGNOSIS — S83.261D PERIPHERAL TEAR OF LATERAL MENISCUS OF RIGHT KNEE AS CURRENT INJURY, SUBSEQUENT ENCOUNTER: Primary | ICD-10-CM

## 2023-06-19 ENCOUNTER — HOSPITAL ENCOUNTER (OUTPATIENT)
Dept: MAMMOGRAPHY | Facility: OTHER | Age: 48
Discharge: HOME | End: 2023-06-19
Attending: FAMILY MEDICINE | Admitting: FAMILY MEDICINE
Payer: COMMERCIAL

## 2023-06-19 DIAGNOSIS — Z12.31 VISIT FOR SCREENING MAMMOGRAM: ICD-10-CM

## 2023-06-19 PROCEDURE — 77067 SCR MAMMO BI INCL CAD: CPT

## 2023-06-26 ENCOUNTER — OFFICE VISIT (OUTPATIENT)
Dept: ORTHOPEDICS | Facility: OTHER | Age: 48
End: 2023-06-26
Attending: FAMILY MEDICINE
Payer: COMMERCIAL

## 2023-06-26 VITALS
OXYGEN SATURATION: 98 % | BODY MASS INDEX: 24.84 KG/M2 | RESPIRATION RATE: 16 BRPM | HEIGHT: 62 IN | HEART RATE: 57 BPM | WEIGHT: 135 LBS

## 2023-06-26 DIAGNOSIS — S83.261D PERIPHERAL TEAR OF LATERAL MENISCUS OF RIGHT KNEE AS CURRENT INJURY, SUBSEQUENT ENCOUNTER: ICD-10-CM

## 2023-06-26 PROCEDURE — 99203 OFFICE O/P NEW LOW 30 MIN: CPT | Performed by: ORTHOPAEDIC SURGERY

## 2023-06-26 ASSESSMENT — PAIN SCALES - GENERAL: PAINLEVEL: NO PAIN (1)

## 2023-06-26 NOTE — PROGRESS NOTES
"Visit Date: 2023    This is a 47-year-old female with right knee pain.  No history of injury to the knee.  She does have a history of arthroscopy in the knee in the past and recently had an MRI done of the right knee because they were concerned about possible internal derangement of the knee.    She comes in today with the results of the MRI.  Most of her pain is located anterior and medial to the patella.  Otherwise, her knee is completely benign today.  It does not appear to have any swelling on it.  It is stable to varus and valgus stress.  Negative Lachman's, negative posterior drawer, and she is nontender to palpation anterolaterally in the knee.    IMAGING:  Review of the MRI shows that the lateral meniscus is abnormal with what is read as \"vertical tearing to the anterior horn of the lateral meniscus and a large adjacent associated parameniscal cyst.\"  She has minimal arthritis in the lateral compartment.  Of note, there is no significant plica appreciated.    IMPRESSION AND PLAN:  This is a 47-year-old female who likely has a plica of her knee for where she is having the pain and her symptoms.  I do not believe that her pain is coming from the lateral meniscus. Regardless, in speaking with her, she wants it definitively treated. Given the findings on the MRI and the possibility of a lateral meniscus tear, as well as possibility of a non-imaged plica, we will go ahead and perform arthroscopy of her right knee to evaluate it.  All the risks and benefits were discussed with her, and she wishes to proceed.  We will see her back at the time of surgery.    Daniel Vázquez MD        D: 2023   T: 2023   MT: radha    Name:     LYSSA PAVON  MRN:      [id number]        Account:    [id number]   :      1975           Visit Date: 2023     Document: [id number]  "

## 2023-06-26 NOTE — PROGRESS NOTES
Patient is here for consult for her right knee, past injury many years ago at age 13. No surgical history of right knee.     Meg Johnson LPN .......  6/26/2023  12:15 PM

## 2023-08-22 PROBLEM — Z00.00 HEALTHCARE MAINTENANCE: Status: ACTIVE | Noted: 2023-08-22

## 2023-08-28 ENCOUNTER — ANESTHESIA EVENT (OUTPATIENT)
Dept: SURGERY | Facility: OTHER | Age: 48
End: 2023-08-28
Payer: COMMERCIAL

## 2023-08-28 ENCOUNTER — ANESTHESIA (OUTPATIENT)
Dept: SURGERY | Facility: OTHER | Age: 48
End: 2023-08-28
Payer: COMMERCIAL

## 2023-08-28 ENCOUNTER — HOSPITAL ENCOUNTER (OUTPATIENT)
Facility: OTHER | Age: 48
Discharge: HOME | End: 2023-08-28
Attending: SURGERY | Admitting: SURGERY
Payer: COMMERCIAL

## 2023-08-28 VITALS
SYSTOLIC BLOOD PRESSURE: 117 MMHG | TEMPERATURE: 97.3 F | HEART RATE: 50 BPM | OXYGEN SATURATION: 99 % | DIASTOLIC BLOOD PRESSURE: 64 MMHG | BODY MASS INDEX: 25.69 KG/M2 | RESPIRATION RATE: 16 BRPM | WEIGHT: 145 LBS | HEIGHT: 63 IN

## 2023-08-28 PROCEDURE — 250N000011 HC RX IP 250 OP 636: Performed by: NURSE ANESTHETIST, CERTIFIED REGISTERED

## 2023-08-28 PROCEDURE — G0121 COLON CA SCRN NOT HI RSK IND: HCPCS | Performed by: SURGERY

## 2023-08-28 PROCEDURE — 999N000010 HC STATISTIC ANES STAT CODE-CRNA PER MINUTE: Performed by: SURGERY

## 2023-08-28 PROCEDURE — 250N000009 HC RX 250: Performed by: NURSE ANESTHETIST, CERTIFIED REGISTERED

## 2023-08-28 PROCEDURE — 45378 DIAGNOSTIC COLONOSCOPY: CPT | Performed by: NURSE ANESTHETIST, CERTIFIED REGISTERED

## 2023-08-28 PROCEDURE — 258N000003 HC RX IP 258 OP 636: Performed by: SURGERY

## 2023-08-28 PROCEDURE — 45378 DIAGNOSTIC COLONOSCOPY: CPT | Performed by: SURGERY

## 2023-08-28 RX ORDER — NALOXONE HYDROCHLORIDE 0.4 MG/ML
0.4 INJECTION, SOLUTION INTRAMUSCULAR; INTRAVENOUS; SUBCUTANEOUS
Status: DISCONTINUED | OUTPATIENT
Start: 2023-08-28 | End: 2023-08-28 | Stop reason: HOSPADM

## 2023-08-28 RX ORDER — ONDANSETRON 2 MG/ML
4 INJECTION INTRAMUSCULAR; INTRAVENOUS
Status: DISCONTINUED | OUTPATIENT
Start: 2023-08-28 | End: 2023-08-28 | Stop reason: HOSPADM

## 2023-08-28 RX ORDER — PROPOFOL 10 MG/ML
INJECTION, EMULSION INTRAVENOUS PRN
Status: DISCONTINUED | OUTPATIENT
Start: 2023-08-28 | End: 2023-08-28

## 2023-08-28 RX ORDER — NALOXONE HYDROCHLORIDE 0.4 MG/ML
0.2 INJECTION, SOLUTION INTRAMUSCULAR; INTRAVENOUS; SUBCUTANEOUS
Status: DISCONTINUED | OUTPATIENT
Start: 2023-08-28 | End: 2023-08-28 | Stop reason: HOSPADM

## 2023-08-28 RX ORDER — SODIUM CHLORIDE, SODIUM LACTATE, POTASSIUM CHLORIDE, CALCIUM CHLORIDE 600; 310; 30; 20 MG/100ML; MG/100ML; MG/100ML; MG/100ML
INJECTION, SOLUTION INTRAVENOUS CONTINUOUS
Status: DISCONTINUED | OUTPATIENT
Start: 2023-08-28 | End: 2023-08-28 | Stop reason: HOSPADM

## 2023-08-28 RX ORDER — LIDOCAINE 40 MG/G
CREAM TOPICAL
Status: DISCONTINUED | OUTPATIENT
Start: 2023-08-28 | End: 2023-08-28 | Stop reason: HOSPADM

## 2023-08-28 RX ORDER — PROPOFOL 10 MG/ML
INJECTION, EMULSION INTRAVENOUS CONTINUOUS PRN
Status: DISCONTINUED | OUTPATIENT
Start: 2023-08-28 | End: 2023-08-28

## 2023-08-28 RX ORDER — LIDOCAINE HYDROCHLORIDE 20 MG/ML
INJECTION, SOLUTION INFILTRATION; PERINEURAL PRN
Status: DISCONTINUED | OUTPATIENT
Start: 2023-08-28 | End: 2023-08-28

## 2023-08-28 RX ORDER — FLUMAZENIL 0.1 MG/ML
0.2 INJECTION, SOLUTION INTRAVENOUS
Status: DISCONTINUED | OUTPATIENT
Start: 2023-08-28 | End: 2023-08-28 | Stop reason: HOSPADM

## 2023-08-28 RX ADMIN — PROPOFOL 100 MG: 10 INJECTION, EMULSION INTRAVENOUS at 07:31

## 2023-08-28 RX ADMIN — SODIUM CHLORIDE, POTASSIUM CHLORIDE, SODIUM LACTATE AND CALCIUM CHLORIDE: 600; 310; 30; 20 INJECTION, SOLUTION INTRAVENOUS at 07:17

## 2023-08-28 RX ADMIN — LIDOCAINE HYDROCHLORIDE 40 MG: 20 INJECTION, SOLUTION INFILTRATION; PERINEURAL at 07:31

## 2023-08-28 RX ADMIN — PROPOFOL 175 MCG/KG/MIN: 10 INJECTION, EMULSION INTRAVENOUS at 07:31

## 2023-08-28 ASSESSMENT — ACTIVITIES OF DAILY LIVING (ADL)
ADLS_ACUITY_SCORE: 35

## 2023-08-28 ASSESSMENT — LIFESTYLE VARIABLES: TOBACCO_USE: 1

## 2023-08-28 NOTE — H&P
History and Physical    CHIEF COMPLAINT / REASON FOR PROCEDURE:  Healthcare maintenance     PERTINENT HISTORY   Patient is a 47 year old female who presents today for colonoscopy for Healthcare maintenance .   First colonoscopy.  Patient has no complaints.    Past Medical History:   Diagnosis Date    Allergic rhinitis due to pollen     No Comments Provided    Cyst of ovary     1994    Dermatitis     scalp    Dysmenorrhea     No Comments Provided    Dysthymic disorder     No Comments Provided    Endometriosis     No Comments Provided    Hypothyroidism     No Comments Provided    Injury of lower back     02/07,Cervical disc injury, C5-6, with improvement (work comp related)    Low back pain     No Comments Provided    Obesity     No Comments Provided    Other cervical disc degeneration, unspecified cervical region     No Comments Provided    Other cervical disc displacement, unspecified cervical region     No Comments Provided    Personal history of other diseases of the female genital tract     G2, P2-0-0-2, vaginal deliveries    Uncomplicated asthma     No Comments Provided     Past Surgical History:   Procedure Laterality Date    ATTEMPTED ARTHROSCOPY      1/20/06,left wrist arthroscopy with debridement - Dr. Mahmood, [facility]    FUSION CERVICAL ANTERIOR ONE LEVEL      2008,C5-6    HYSTERECTOMY TOTAL ABDOMINAL, BILATERAL SALPINGO-OOPHORECTOMY, COMBINED      11/04,bilateral salpingo-oophorectomy    LAPAROSCOPY DIAGNOSTIC (GENERAL)      6/04,02/07    OTHER SURGICAL HISTORY       x 2,TAT964,VAGINAL DELIVERY    OTHER SURGICAL HISTORY      1/19/2012,205448,REMOVAL OF FOREIGN BODY,ACDF C4-5 and C6-7 with hardware removal [Other][[[       Bleeding tendencies:  No    ALLERGIES/SENSITIVITIES:   Allergies   Allergen Reactions    Amoxil [Amoxicillin] Other (See Comments), Nausea and Vomiting and GI Disturbance     Lethargic    Hydrocodone-Acetaminophen Itching and Nausea    Meperidine Itching     No hives    Morphine      " Other reaction(s): Erythema  Face & hands    Oxycodone-Acetaminophen Itching    Propoxyphene N-Apap      Other reaction(s): Throat Swelling/Closing        CURRENT MEDICATIONS:    Prior to Admission medications    Medication Sig Start Date End Date Taking? Authorizing Provider   Cetirizine HCl (ZYRTEC ALLERGY PO) Take by mouth daily as needed   Yes Reported, Patient   levothyroxine (SYNTHROID/LEVOTHROID) 125 MCG tablet Take 1 tablet (125 mcg) by mouth daily 4/24/23  Yes Jerardo Navarrete MD   montelukast (SINGULAIR) 10 MG tablet Take 1 tablet (10 mg) by mouth nightly as needed (allergies) 2/21/23  Yes Raman Moctezuma MD       Physical Exam:  /79   Pulse 69   Temp 98.4  F (36.9  C) (Tympanic)   Resp 16   Ht 1.594 m (5' 2.75\")   Wt 65.8 kg (145 lb)   LMP  (LMP Unknown)   SpO2 98%   BMI 25.89 kg/m    EXAM:  Chest/Respiratory Exam: Normal - Clear to auscultation without rales, rhonchi, or wheezing.  Cardiovascular Exam: normal, regular rate and rhythm        PLAN: COLONOSCOPY .  Patient understands risks of bleeding, perforation, potential inability to reach cecum, aspiration and wishes to proceed.      "

## 2023-08-28 NOTE — OP NOTE
PROCEDURE NOTE    DATE OF SERVICE: 8/28/2023    SURGEON: Dorian Schrader MD    PRE-OP DIAGNOSIS:    Healthcare maintenance         POST-OP DIAGNOSIS:  Same  Normal Exam      PROCEDURE:   Colonoscopy      ANESTHESIA:  ISAIAH Clark CRNA    INDICATION FOR THE PROCEDURE: The patient is a 47 year old female in need of Healthcare maintenance  . The patient has no other complaints  . After explaining the risks to include bleeding, perforation, potential inability toreach the cecum, the patient wished to proceed.    PROCEDURE:After adequate sedation, the patient was in the left lateral decubitus position.  Rectal exam was performed.  There was normal tone and no palpable masses .  The colonoscope was introduced into the rectum and advanced to the cecum with Moderate difficulty due to tight pelvic bend.  The patient's prep was excellent.  The terminal cecum was reached.  The cecum, ascending, transverse, descending and sigmoid colon was unremarkable .  The scope was retroflexed in the rectum.  The rectum was unremarkable  .  The scope was straightened and removed.  The patient tolerated the procedure well.     ESTIMATED BLOOD LOSS: none    COMPLICATIONS:  None    TISSUE REMOVED:  No    RECOMMEND:      Follow-up in 10 years      Dorian Schrader MD FACS

## 2023-08-28 NOTE — DISCHARGE INSTRUCTIONS
[facility]  Adult Discharge Orders & Instructions      For 24 hours after surgery:  Get plenty of rest.  A responsible adult must stay with you for at least 24 hours after you leave the hospital.   You may feel lightheaded.  IF so, sit for a few minutes before standing.  Have someone help you get up.   You may have a slight fever. Call the doctor if your fever is over 101 F (38.3 C) (taken under the tongue) or lasts longer than 24 hours.  You may have a dry mouth, a sore throat, muscle aches or trouble sleeping.  These should go away after 24 hours.  Do not make important or legal decisions.  6.   Do not drive or use heavy equipment.  If you have weakness or tingling, don't drive or use heavy equipment until this feeling goes away.                                                                                                                                                                           To contact a doctor, call    [phone]

## 2023-08-28 NOTE — OR NURSING
Pt has been discharged to home at 0828 via ambulatory accompanied by daughter Antonia.    Written discharge instructions were provided to pt.  Prescriptions were none.  Patient states their pain is none, and denies any nausea or dizziness upon discharge.    Patient verbalize understanding of discharge instructions including no driving until tomorrow and no longer taking narcotic pain medications, no operating mechanical equipment, and no making any important decisions.They understand reason for discharge, and necessary follow-up appointments.  Laura Navarro RN on 8/28/2023 at 10:10 AM

## 2023-08-28 NOTE — ANESTHESIA CARE TRANSFER NOTE
Patient: Mallorie Machado    Procedure: Procedure(s):  Colonoscopy       Diagnosis: Encounter for screening colonoscopy [Z12.11]  Diagnosis Additional Information: No value filed.    Anesthesia Type:   MAC     Note:    Oropharynx: oropharynx clear of all foreign objects and spontaneously breathing  Level of Consciousness: drowsy  Oxygen Supplementation: room air    Independent Airway: airway patency satisfactory and stable  Dentition: dentition unchanged  Vital Signs Stable: post-procedure vital signs reviewed and stable  Report to RN Given: handoff report given  Patient transferred to: Phase II    Handoff Report: Identifed the Patient, Identified the Reponsible Provider, Reviewed the pertinent medical history, Discussed the surgical course, Reviewed Intra-OP anesthesia mangement and issues during anesthesia, Set expectations for post-procedure period and Allowed opportunity for questions and acknowledgement of understanding    Vitals:  Vitals Value Taken Time   BP     Temp     Pulse     Resp     SpO2         Electronically Signed By: UMA Wright CRNA  August 28, 2023  8:01 AM

## 2023-08-28 NOTE — ANESTHESIA PREPROCEDURE EVALUATION
Anesthesia Pre-Procedure Evaluation    Patient: Mallorie Machado   MRN: [id number] : 1975        Procedure : Procedure(s):  Colonoscopy          Past Medical History:   Diagnosis Date    Allergic rhinitis due to pollen     No Comments Provided    Cyst of ovary         Dermatitis     scalp    Dysmenorrhea     No Comments Provided    Dysthymic disorder     No Comments Provided    Endometriosis     No Comments Provided    Hypothyroidism     No Comments Provided    Injury of lower back     ,Cervical disc injury, C5-6, with improvement (work comp related)    Low back pain     No Comments Provided    Obesity     No Comments Provided    Other cervical disc degeneration, unspecified cervical region     No Comments Provided    Other cervical disc displacement, unspecified cervical region     No Comments Provided    Personal history of other diseases of the female genital tract     G2, P2-0-0-2, vaginal deliveries    Uncomplicated asthma     No Comments Provided      Past Surgical History:   Procedure Laterality Date    ATTEMPTED ARTHROSCOPY      06,left wrist arthroscopy with debridement - Dr. Mahmood, [facility]    FUSION CERVICAL ANTERIOR ONE LEVEL      ,C5-6    HYSTERECTOMY TOTAL ABDOMINAL, BILATERAL SALPINGO-OOPHORECTOMY, COMBINED      ,bilateral salpingo-oophorectomy    LAPAROSCOPY DIAGNOSTIC (GENERAL)      ,    OTHER SURGICAL HISTORY       x 2,NNZ307,VAGINAL DELIVERY    OTHER SURGICAL HISTORY      2012,2054,REMOVAL OF FOREIGN BODY,ACDF C4-5 and C6-7 with hardware removal [Other][[[      Allergies   Allergen Reactions    Amoxil [Amoxicillin] Other (See Comments), Nausea and Vomiting and GI Disturbance     Lethargic    Hydrocodone-Acetaminophen Itching and Nausea    Meperidine Itching     No hives    Morphine      Other reaction(s): Erythema  Face & hands    Oxycodone-Acetaminophen Itching    Propoxyphene N-Apap      Other reaction(s): Throat Swelling/Closing      Social  History     Tobacco Use    Smoking status: Every Day     Packs/day: 0.50     Years: 24.00     Pack years: 12.00     Types: Cigarettes    Smokeless tobacco: Never   Substance Use Topics    Alcohol use: Not Currently     Alcohol/week: 0.0 standard drinks of alcohol      Wt Readings from Last 1 Encounters:   06/26/23 61.2 kg (135 lb)        Anesthesia Evaluation   Pt has had prior anesthetic.     No history of anesthetic complications       ROS/MED HX  ENT/Pulmonary:     (+)           allergic rhinitis,     tobacco use, Current use,    asthma                  Neurologic:  - neg neurologic ROS     Cardiovascular:  - neg cardiovascular ROS     METS/Exercise Tolerance: >4 METS    Hematologic:  - neg hematologic  ROS     Musculoskeletal:  - neg musculoskeletal ROS     GI/Hepatic:  - neg GI/hepatic ROS     Renal/Genitourinary:  - neg Renal ROS     Endo:     (+)          thyroid problem, hypothyroidism,           Psychiatric/Substance Use:  - neg psychiatric ROS     Infectious Disease:  - neg infectious disease ROS     Malignancy:  - neg malignancy ROS     Other:  - neg other ROS          Physical Exam    Airway  airway exam normal    Comment: Hx of neck fusion x3 with preserved ROM    Mallampati: II   TM distance: > 3 FB   Neck ROM: full   Mouth opening: > 3 cm    Respiratory Devices and Support         Dental       (+) Minor Abnormalities - some fillings, tiny chips      Cardiovascular   cardiovascular exam normal       Rhythm and rate: regular and normal     Pulmonary   pulmonary exam normal        breath sounds clear to auscultation           OUTSIDE LABS:  CBC:   Lab Results   Component Value Date    WBC 4.7 12/06/2021    HGB 16.6 (H) 12/06/2021    HGB 14.5 07/15/2017    HCT 48.3 (H) 12/06/2021    HCT 41.8 07/15/2017     12/06/2021     07/15/2017     BMP:   Lab Results   Component Value Date     12/06/2021     04/22/2015    POTASSIUM 4.1 12/06/2021    POTASSIUM 3.8 04/22/2015    CHLORIDE 104  12/06/2021    CHLORIDE 98 04/22/2015    CO2 26 12/06/2021    CO2 33 (H) 04/22/2015    BUN 19 12/06/2021    BUN 14 04/22/2015    CR 0.65 12/06/2021    CR 0.96 04/22/2015     (H) 12/06/2021    GLC 85 08/13/2019     COAGS: No results found for: PTT, INR, FIBR  POC: No results found for: BGM, HCG, HCGS  HEPATIC:   Lab Results   Component Value Date    ALBUMIN 4.4 12/06/2021    PROTTOTAL 7.4 12/06/2021    ALT 14 12/06/2021    AST 14 12/06/2021    ALKPHOS 39 12/06/2021    BILITOTAL 0.4 12/06/2021     OTHER:   Lab Results   Component Value Date    A1C 6.0 08/13/2019    AVERY 9.2 12/06/2021    TSH 0.13 (L) 05/18/2023       Anesthesia Plan    ASA Status:  2    NPO Status:  NPO Appropriate    Anesthesia Type: MAC.   Induction: Propofol.   Maintenance: Balanced.        Consents    Anesthesia Plan(s) and associated risks, benefits, and realistic alternatives discussed. Questions answered and patient/representative(s) expressed understanding.     - Discussed: Risks, Benefits and Alternatives for BOTH SEDATION and the PROCEDURE were discussed     - Discussed with:  Patient      - Extended Intubation/Ventilatory Support Discussed: No.      - Patient is DNR/DNI Status: No     Use of blood products discussed: No .     Postoperative Care            Comments:                UMA LIU CRNA

## 2023-08-28 NOTE — ANESTHESIA POSTPROCEDURE EVALUATION
Patient: Mallorie Machado    Procedure: Procedure(s):  Colonoscopy       Anesthesia Type:  MAC    Note:  Disposition: Outpatient   Postop Pain Control: Uneventful            Sign Out: Well controlled pain   PONV: No   Neuro/Psych: Uneventful            Sign Out: Acceptable/Baseline neuro status   Airway/Respiratory: Uneventful            Sign Out: Acceptable/Baseline resp. status   CV/Hemodynamics: Uneventful            Sign Out: Acceptable CV status; No obvious hypovolemia; No obvious fluid overload   Other NRE: NONE   DID A NON-ROUTINE EVENT OCCUR?            Last vitals:  Vitals Value Taken Time   /65 08/28/23 0830   Temp 97.3  F (36.3  C) 08/28/23 0800   Pulse 55 08/28/23 0830   Resp 16 08/28/23 0800   SpO2 100 % 08/28/23 0833   Vitals shown include unvalidated device data.    Electronically Signed By: UMA Wright CRNA  August 28, 2023  8:34 AM

## 2023-09-29 ENCOUNTER — OFFICE VISIT (OUTPATIENT)
Dept: FAMILY MEDICINE | Facility: OTHER | Age: 48
End: 2023-09-29
Attending: FAMILY MEDICINE
Payer: COMMERCIAL

## 2023-09-29 VITALS
WEIGHT: 149.4 LBS | HEART RATE: 69 BPM | SYSTOLIC BLOOD PRESSURE: 128 MMHG | DIASTOLIC BLOOD PRESSURE: 76 MMHG | TEMPERATURE: 98 F | OXYGEN SATURATION: 95 % | BODY MASS INDEX: 26.47 KG/M2 | RESPIRATION RATE: 17 BRPM | HEIGHT: 63 IN

## 2023-09-29 DIAGNOSIS — E03.9 HYPOTHYROIDISM, UNSPECIFIED TYPE: ICD-10-CM

## 2023-09-29 DIAGNOSIS — J30.1 SEASONAL ALLERGIC RHINITIS DUE TO POLLEN: ICD-10-CM

## 2023-09-29 DIAGNOSIS — R01.1 UNDIAGNOSED CARDIAC MURMURS: Primary | ICD-10-CM

## 2023-09-29 DIAGNOSIS — S83.261D PERIPHERAL TEAR OF LATERAL MENISCUS OF RIGHT KNEE AS CURRENT INJURY, SUBSEQUENT ENCOUNTER: ICD-10-CM

## 2023-09-29 LAB
ERYTHROCYTE [DISTWIDTH] IN BLOOD BY AUTOMATED COUNT: 12.2 % (ref 10–15)
HCT VFR BLD AUTO: 42.5 % (ref 35–47)
HGB BLD-MCNC: 14.5 G/DL (ref 11.7–15.7)
MCH RBC QN AUTO: 30 PG (ref 26.5–33)
MCHC RBC AUTO-ENTMCNC: 34.1 G/DL (ref 31.5–36.5)
MCV RBC AUTO: 88 FL (ref 78–100)
PLATELET # BLD AUTO: 272 10E3/UL (ref 150–450)
RBC # BLD AUTO: 4.83 10E6/UL (ref 3.8–5.2)
TSH SERPL DL<=0.005 MIU/L-ACNC: 0.25 UIU/ML (ref 0.3–4.2)
WBC # BLD AUTO: 6.9 10E3/UL (ref 4–11)

## 2023-09-29 PROCEDURE — 84443 ASSAY THYROID STIM HORMONE: CPT | Mod: ZL | Performed by: FAMILY MEDICINE

## 2023-09-29 PROCEDURE — 85027 COMPLETE CBC AUTOMATED: CPT | Mod: ZL | Performed by: FAMILY MEDICINE

## 2023-09-29 PROCEDURE — 36415 COLL VENOUS BLD VENIPUNCTURE: CPT | Mod: ZL | Performed by: FAMILY MEDICINE

## 2023-09-29 PROCEDURE — 99214 OFFICE O/P EST MOD 30 MIN: CPT | Performed by: FAMILY MEDICINE

## 2023-09-29 RX ORDER — LEVOTHYROXINE SODIUM 112 UG/1
112 TABLET ORAL DAILY
Qty: 90 TABLET | Refills: 4 | Status: SHIPPED | OUTPATIENT
Start: 2023-09-29 | End: 2024-06-10

## 2023-09-29 RX ORDER — MONTELUKAST SODIUM 10 MG/1
10 TABLET ORAL
Qty: 90 TABLET | Refills: 4 | Status: SHIPPED | OUTPATIENT
Start: 2023-09-29 | End: 2024-09-23

## 2023-09-29 ASSESSMENT — PAIN SCALES - GENERAL: PAINLEVEL: EXTREME PAIN (8)

## 2023-09-29 NOTE — H&P (VIEW-ONLY)
[facility]  [address]  [place] [address]  Phone: [phone]  Fax: [phone]  Primary Provider: Jerardo Navarrete  Pre-op Performing Provider: JERARDO NAVARRETE      PREOPERATIVE EVALUATION:  Today's date: 9/29/2023    Mallorie is a 47 year old female who presents for a preoperative evaluation.      9/29/2023     3:16 PM   Additional Questions   Roomed by JOCELYN Knight   Accompanied by Self         9/29/2023     3:16 PM   Patient Reported Additional Medications   Patient reports taking the following new medications N/A       Surgical Information:  Surgery/Procedure: Right knee scope  Surgery Location: [facility]  Surgeon:   Surgery Date: 10/09/2023  Time of Surgery: TBD  Where patient plans to recover: At home with family  Fax number for surgical facility: Note does not need to be faxed, will be available electronically in Epic.    Assessment & Plan     The proposed surgical procedure is considered LOW risk.    (S83.261D) Peripheral tear of lateral meniscus of right knee as current injury, subsequent encounter  (primary encounter diagnosis)  Comment: with locking, surgery is clearly indicated  Plan:      (J30.1) Seasonal allergic rhinitis due to pollen  Comment: stable, refilled per her request.  Plan: montelukast (SINGULAIR) 10 MG tablet           (R01.1) Undiagnosed cardiac murmurs  (primary encounter diagnosis)  Comment: this is very mild, but definitely new. It could be secondary to her thyroid medication/hyperthyroidism. Will drop her dose slightly from 125 microgram to 112. Might simply be a flow murmur. Will get an echo to rule out aortic valve disease. Is still safe to proceed with the surgery, given has no symptoms and this is so mild.   Plan:                    - No identified additional risk factors other than previously addressed    Antiplatelet or Anticoagulation Medication Instructions:   - Patient is on no antiplatelet or anticoagulation medications.    Additional  Medication Instructions:  Patient is to take all scheduled medications on the day of surgery    RECOMMENDATION:  APPROVAL GIVEN to proceed with proposed procedure, without further diagnostic evaluation.            Subjective       HPI related to upcoming procedure: has had several knee injuries, initially at age 13 skiing. Later had it caught in a wheelchair. Lat spring it was getting worse, ibuprofen was not helping, had an MRI, showing tear of anterior horn of lateral meniscus with a cyst. Is getting locking, in a flexed position at times.         9/28/2023     3:26 PM   Preop Questions   1. Have you ever had a heart attack or stroke? No   2. Have you ever had surgery on your heart or blood vessels, such as a stent placement, a coronary artery bypass, or surgery on an artery in your head, neck, heart, or legs? No   3. Do you have chest pain with activity? No   4. Do you have a history of  heart failure? No   5. Do you currently have a cold, bronchitis or symptoms of other infection? No   6. Do you have a cough, shortness of breath, or wheezing? No   7. Do you or anyone in your family have previous history of blood clots? No   8. Do you or does anyone in your family have a serious bleeding problem such as prolonged bleeding following surgeries or cuts? No   9. Have you ever had problems with anemia or been told to take iron pills? No   10. Have you had any abnormal blood loss such as black, tarry or bloody stools, or abnormal vaginal bleeding? No   11. Have you ever had a blood transfusion? No   12. Are you willing to have a blood transfusion if it is medically needed before, during, or after your surgery? Yes   13. Have you or any of your relatives ever had problems with anesthesia? No   14. Do you have sleep apnea, excessive snoring or daytime drowsiness? No   15. Do you have any artifical heart valves or other implanted medical devices like a pacemaker, defibrillator, or continuous glucose monitor? No   16. Do  you have artificial joints? No   17. Are you allergic to latex? No   18. Is there any chance that you may be pregnant? No       Health Care Directive:  Patient does not have a Health Care Directive or Living Will: Discussed advance care planning with patient; information given to patient to review.    Preoperative Review of :   reviewed - no record of controlled substances prescribed.          Review of Systems  Constitutional, neuro, ENT, endocrine, pulmonary, cardiac, gastrointestinal, genitourinary, musculoskeletal, integument and psychiatric systems are negative, except as otherwise noted.    Patient Active Problem List    Diagnosis Date Noted    Healthcare maintenance 08/22/2023     Priority: Medium    Allergic rhinitis due to pollen 01/30/2018     Priority: Medium    Contact dermatitis and eczema 01/30/2018     Priority: Medium     Overview:   recurrent      Dysmenorrhea 01/30/2018     Priority: Medium     Overview:   pelvic pain      Hypothyroidism 01/30/2018     Priority: Medium    Pain in joint, forearm 01/30/2018     Priority: Medium     Overview:   chronic      Impaired fasting glucose 09/13/2013     Priority: Medium    Trigeminal neuritis 04/20/2013     Priority: Medium    Herniated cervical disc 01/13/2012     Priority: Medium    Endometriosis 10/29/2010     Priority: Medium    Degeneration of cervical intervertebral disc 07/27/2010     Priority: Medium      Past Medical History:   Diagnosis Date    Allergic rhinitis due to pollen     No Comments Provided    Cyst of ovary     1994    Dermatitis     scalp    Dysmenorrhea     No Comments Provided    Dysthymic disorder     No Comments Provided    Endometriosis     No Comments Provided    Hypothyroidism     No Comments Provided    Injury of lower back     02/07,Cervical disc injury, C5-6, with improvement (work comp related)    Low back pain     No Comments Provided    Obesity     No Comments Provided    Other cervical disc degeneration, unspecified  cervical region     No Comments Provided    Other cervical disc displacement, unspecified cervical region     No Comments Provided    Personal history of other diseases of the female genital tract     G2, P2-0-0-2, vaginal deliveries    Uncomplicated asthma     No Comments Provided     Past Surgical History:   Procedure Laterality Date    ATTEMPTED ARTHROSCOPY      1/20/06,left wrist arthroscopy with debridement - Dr. Mahmood, [facility]    COLONOSCOPY  08/28/2023    F/U 2033 normal    COLONOSCOPY N/A 8/28/2023    Procedure: Colonoscopy;  Surgeon: Dorian Schrader MD;  Location: [facility] OR    FUSION CERVICAL ANTERIOR ONE LEVEL      2008,C5-6    HYSTERECTOMY TOTAL ABDOMINAL, BILATERAL SALPINGO-OOPHORECTOMY, COMBINED      11/04,bilateral salpingo-oophorectomy    LAPAROSCOPY DIAGNOSTIC (GENERAL)      6/04,02/07    OTHER SURGICAL HISTORY       x 2,RTC324,VAGINAL DELIVERY    OTHER SURGICAL HISTORY      1/19/2012,205448,REMOVAL OF FOREIGN BODY,ACDF C4-5 and C6-7 with hardware removal [Other][[[     Current Outpatient Medications   Medication Sig Dispense Refill    Cetirizine HCl (ZYRTEC ALLERGY PO) Take by mouth daily as needed      levothyroxine (SYNTHROID/LEVOTHROID) 125 MCG tablet Take 1 tablet (125 mcg) by mouth daily 90 tablet 4    montelukast (SINGULAIR) 10 MG tablet Take 1 tablet (10 mg) by mouth nightly as needed (allergies) 90 tablet 0       Allergies   Allergen Reactions    Amoxil [Amoxicillin] Other (See Comments), Nausea and Vomiting and GI Disturbance     Lethargic    Hydrocodone-Acetaminophen Itching and Nausea    Meperidine Itching     No hives    Morphine      Other reaction(s): Erythema  Face & hands    Oxycodone-Acetaminophen Itching    Propoxyphene N-Apap      Other reaction(s): Throat Swelling/Closing        Social History     Tobacco Use    Smoking status: Every Day     Packs/day: 0.50     Years: 24.00     Pack years: 12.00     Types: Cigarettes    Smokeless tobacco: Never   Substance Use Topics    Alcohol  "use: Not Currently     Alcohol/week: 0.0 standard drinks of alcohol       History   Drug Use Unknown         Objective     /76   Pulse 69   Temp 98  F (36.7  C) (Tympanic)   Resp 17   Ht 1.588 m (5' 2.5\")   Wt 67.8 kg (149 lb 6.4 oz)   LMP  (LMP Unknown)   SpO2 95%   BMI 26.89 kg/m      Physical Exam    GENERAL APPEARANCE: healthy, alert and no distress     EYES: EOMI, PERRL     HENT: ear canals and TM's normal and nose and mouth without ulcers or lesions     NECK: no adenopathy, no asymmetry, masses, or scars and thyroid normal to palpation     RESP: lungs clear to auscultation - no rales, rhonchi or wheezes     CV: regular rates and rhythm, normal S1 S2, no S3 or S4 and 1/6 systolic ejection murmur, click or rub     ABDOMEN:  soft, nontender, no HSM or masses and bowel sounds normal     MS: extremities normal- no gross deformities noted, no evidence of inflammation in joints, FROM in all extremities.     SKIN: no suspicious lesions or rashes     NEURO: Normal strength and tone, sensory exam grossly normal, mentation intact and speech normal     PSYCH: mentation appears normal. and affect normal/bright     LYMPHATICS: No cervical adenopathy    Recent Labs   Lab Test 12/06/21  1153   HGB 16.6*         POTASSIUM 4.1   CR 0.65        Diagnostics:  Results for orders placed or performed in visit on 09/29/23   CBC W PLT No Diff     Status: Normal   Result Value Ref Range    WBC Count 6.9 4.0 - 11.0 10e3/uL    RBC Count 4.83 3.80 - 5.20 10e6/uL    Hemoglobin 14.5 11.7 - 15.7 g/dL    Hematocrit 42.5 35.0 - 47.0 %    MCV 88 78 - 100 fL    MCH 30.0 26.5 - 33.0 pg    MCHC 34.1 31.5 - 36.5 g/dL    RDW 12.2 10.0 - 15.0 %    Platelet Count 272 150 - 450 10e3/uL   TSH     Status: Abnormal   Result Value Ref Range    TSH 0.25 (L) 0.30 - 4.20 uIU/mL       No EKG required, no history of coronary heart disease, significant arrhythmia, peripheral arterial disease or other structural heart " disease.    Revised Cardiac Risk Index (RCRI):  The patient has the following serious cardiovascular risks for perioperative complications:   - No serious cardiac risks = 0 points     RCRI Interpretation: 0 points: Class I (very low risk - 0.4% complication rate)         Signed Electronically by: Jerardo Navarrete MD  Copy of this evaluation report is provided to requesting physician.

## 2023-09-29 NOTE — NURSING NOTE
"Chief Complaint   Patient presents with    Pre-Op Exam     Right knee scope       Initial /76   Pulse 69   Temp 98  F (36.7  C) (Tympanic)   Resp 17   Ht 1.588 m (5' 2.5\")   Wt 67.8 kg (149 lb 6.4 oz)   LMP  (LMP Unknown)   SpO2 95%   BMI 26.89 kg/m   Estimated body mass index is 26.89 kg/m  as calculated from the following:    Height as of this encounter: 1.588 m (5' 2.5\").    Weight as of this encounter: 67.8 kg (149 lb 6.4 oz).  Medication Reconciliation: complete            "

## 2023-09-29 NOTE — PROGRESS NOTES
[facility]  [address]  [place] [address]  Phone: [phone]  Fax: [phone]  Primary Provider: Jerardo Navarrete  Pre-op Performing Provider: JERARDO NAVARRETE      PREOPERATIVE EVALUATION:  Today's date: 9/29/2023    Mallorie is a 47 year old female who presents for a preoperative evaluation.      9/29/2023     3:16 PM   Additional Questions   Roomed by JOCELYN Knight   Accompanied by Self         9/29/2023     3:16 PM   Patient Reported Additional Medications   Patient reports taking the following new medications N/A       Surgical Information:  Surgery/Procedure: Right knee scope  Surgery Location: [facility]  Surgeon:   Surgery Date: 10/09/2023  Time of Surgery: TBD  Where patient plans to recover: At home with family  Fax number for surgical facility: Note does not need to be faxed, will be available electronically in Epic.    Assessment & Plan     The proposed surgical procedure is considered LOW risk.    (S83.261D) Peripheral tear of lateral meniscus of right knee as current injury, subsequent encounter  (primary encounter diagnosis)  Comment: with locking, surgery is clearly indicated  Plan:      (J30.1) Seasonal allergic rhinitis due to pollen  Comment: stable, refilled per her request.  Plan: montelukast (SINGULAIR) 10 MG tablet           (R01.1) Undiagnosed cardiac murmurs  (primary encounter diagnosis)  Comment: this is very mild, but definitely new. It could be secondary to her thyroid medication/hyperthyroidism. Will drop her dose slightly from 125 microgram to 112. Might simply be a flow murmur. Will get an echo to rule out aortic valve disease. Is still safe to proceed with the surgery, given has no symptoms and this is so mild.   Plan:                    - No identified additional risk factors other than previously addressed    Antiplatelet or Anticoagulation Medication Instructions:   - Patient is on no antiplatelet or anticoagulation medications.    Additional  Medication Instructions:  Patient is to take all scheduled medications on the day of surgery    RECOMMENDATION:  APPROVAL GIVEN to proceed with proposed procedure, without further diagnostic evaluation.            Subjective       HPI related to upcoming procedure: has had several knee injuries, initially at age 13 skiing. Later had it caught in a wheelchair. Lat spring it was getting worse, ibuprofen was not helping, had an MRI, showing tear of anterior horn of lateral meniscus with a cyst. Is getting locking, in a flexed position at times.         9/28/2023     3:26 PM   Preop Questions   1. Have you ever had a heart attack or stroke? No   2. Have you ever had surgery on your heart or blood vessels, such as a stent placement, a coronary artery bypass, or surgery on an artery in your head, neck, heart, or legs? No   3. Do you have chest pain with activity? No   4. Do you have a history of  heart failure? No   5. Do you currently have a cold, bronchitis or symptoms of other infection? No   6. Do you have a cough, shortness of breath, or wheezing? No   7. Do you or anyone in your family have previous history of blood clots? No   8. Do you or does anyone in your family have a serious bleeding problem such as prolonged bleeding following surgeries or cuts? No   9. Have you ever had problems with anemia or been told to take iron pills? No   10. Have you had any abnormal blood loss such as black, tarry or bloody stools, or abnormal vaginal bleeding? No   11. Have you ever had a blood transfusion? No   12. Are you willing to have a blood transfusion if it is medically needed before, during, or after your surgery? Yes   13. Have you or any of your relatives ever had problems with anesthesia? No   14. Do you have sleep apnea, excessive snoring or daytime drowsiness? No   15. Do you have any artifical heart valves or other implanted medical devices like a pacemaker, defibrillator, or continuous glucose monitor? No   16. Do  you have artificial joints? No   17. Are you allergic to latex? No   18. Is there any chance that you may be pregnant? No       Health Care Directive:  Patient does not have a Health Care Directive or Living Will: Discussed advance care planning with patient; information given to patient to review.    Preoperative Review of :   reviewed - no record of controlled substances prescribed.          Review of Systems  Constitutional, neuro, ENT, endocrine, pulmonary, cardiac, gastrointestinal, genitourinary, musculoskeletal, integument and psychiatric systems are negative, except as otherwise noted.    Patient Active Problem List    Diagnosis Date Noted    Healthcare maintenance 08/22/2023     Priority: Medium    Allergic rhinitis due to pollen 01/30/2018     Priority: Medium    Contact dermatitis and eczema 01/30/2018     Priority: Medium     Overview:   recurrent      Dysmenorrhea 01/30/2018     Priority: Medium     Overview:   pelvic pain      Hypothyroidism 01/30/2018     Priority: Medium    Pain in joint, forearm 01/30/2018     Priority: Medium     Overview:   chronic      Impaired fasting glucose 09/13/2013     Priority: Medium    Trigeminal neuritis 04/20/2013     Priority: Medium    Herniated cervical disc 01/13/2012     Priority: Medium    Endometriosis 10/29/2010     Priority: Medium    Degeneration of cervical intervertebral disc 07/27/2010     Priority: Medium      Past Medical History:   Diagnosis Date    Allergic rhinitis due to pollen     No Comments Provided    Cyst of ovary     1994    Dermatitis     scalp    Dysmenorrhea     No Comments Provided    Dysthymic disorder     No Comments Provided    Endometriosis     No Comments Provided    Hypothyroidism     No Comments Provided    Injury of lower back     02/07,Cervical disc injury, C5-6, with improvement (work comp related)    Low back pain     No Comments Provided    Obesity     No Comments Provided    Other cervical disc degeneration, unspecified  cervical region     No Comments Provided    Other cervical disc displacement, unspecified cervical region     No Comments Provided    Personal history of other diseases of the female genital tract     G2, P2-0-0-2, vaginal deliveries    Uncomplicated asthma     No Comments Provided     Past Surgical History:   Procedure Laterality Date    ATTEMPTED ARTHROSCOPY      1/20/06,left wrist arthroscopy with debridement - Dr. Mahmood, [facility]    COLONOSCOPY  08/28/2023    F/U 2033 normal    COLONOSCOPY N/A 8/28/2023    Procedure: Colonoscopy;  Surgeon: Dorian Schrader MD;  Location: [facility] OR    FUSION CERVICAL ANTERIOR ONE LEVEL      2008,C5-6    HYSTERECTOMY TOTAL ABDOMINAL, BILATERAL SALPINGO-OOPHORECTOMY, COMBINED      11/04,bilateral salpingo-oophorectomy    LAPAROSCOPY DIAGNOSTIC (GENERAL)      6/04,02/07    OTHER SURGICAL HISTORY       x 2,RNY662,VAGINAL DELIVERY    OTHER SURGICAL HISTORY      1/19/2012,205448,REMOVAL OF FOREIGN BODY,ACDF C4-5 and C6-7 with hardware removal [Other][[[     Current Outpatient Medications   Medication Sig Dispense Refill    Cetirizine HCl (ZYRTEC ALLERGY PO) Take by mouth daily as needed      levothyroxine (SYNTHROID/LEVOTHROID) 125 MCG tablet Take 1 tablet (125 mcg) by mouth daily 90 tablet 4    montelukast (SINGULAIR) 10 MG tablet Take 1 tablet (10 mg) by mouth nightly as needed (allergies) 90 tablet 0       Allergies   Allergen Reactions    Amoxil [Amoxicillin] Other (See Comments), Nausea and Vomiting and GI Disturbance     Lethargic    Hydrocodone-Acetaminophen Itching and Nausea    Meperidine Itching     No hives    Morphine      Other reaction(s): Erythema  Face & hands    Oxycodone-Acetaminophen Itching    Propoxyphene N-Apap      Other reaction(s): Throat Swelling/Closing        Social History     Tobacco Use    Smoking status: Every Day     Packs/day: 0.50     Years: 24.00     Pack years: 12.00     Types: Cigarettes    Smokeless tobacco: Never   Substance Use Topics    Alcohol  "use: Not Currently     Alcohol/week: 0.0 standard drinks of alcohol       History   Drug Use Unknown         Objective     /76   Pulse 69   Temp 98  F (36.7  C) (Tympanic)   Resp 17   Ht 1.588 m (5' 2.5\")   Wt 67.8 kg (149 lb 6.4 oz)   LMP  (LMP Unknown)   SpO2 95%   BMI 26.89 kg/m      Physical Exam    GENERAL APPEARANCE: healthy, alert and no distress     EYES: EOMI, PERRL     HENT: ear canals and TM's normal and nose and mouth without ulcers or lesions     NECK: no adenopathy, no asymmetry, masses, or scars and thyroid normal to palpation     RESP: lungs clear to auscultation - no rales, rhonchi or wheezes     CV: regular rates and rhythm, normal S1 S2, no S3 or S4 and 1/6 systolic ejection murmur, click or rub     ABDOMEN:  soft, nontender, no HSM or masses and bowel sounds normal     MS: extremities normal- no gross deformities noted, no evidence of inflammation in joints, FROM in all extremities.     SKIN: no suspicious lesions or rashes     NEURO: Normal strength and tone, sensory exam grossly normal, mentation intact and speech normal     PSYCH: mentation appears normal. and affect normal/bright     LYMPHATICS: No cervical adenopathy    Recent Labs   Lab Test 12/06/21  1153   HGB 16.6*         POTASSIUM 4.1   CR 0.65        Diagnostics:  Results for orders placed or performed in visit on 09/29/23   CBC W PLT No Diff     Status: Normal   Result Value Ref Range    WBC Count 6.9 4.0 - 11.0 10e3/uL    RBC Count 4.83 3.80 - 5.20 10e6/uL    Hemoglobin 14.5 11.7 - 15.7 g/dL    Hematocrit 42.5 35.0 - 47.0 %    MCV 88 78 - 100 fL    MCH 30.0 26.5 - 33.0 pg    MCHC 34.1 31.5 - 36.5 g/dL    RDW 12.2 10.0 - 15.0 %    Platelet Count 272 150 - 450 10e3/uL   TSH     Status: Abnormal   Result Value Ref Range    TSH 0.25 (L) 0.30 - 4.20 uIU/mL       No EKG required, no history of coronary heart disease, significant arrhythmia, peripheral arterial disease or other structural heart " disease.    Revised Cardiac Risk Index (RCRI):  The patient has the following serious cardiovascular risks for perioperative complications:   - No serious cardiac risks = 0 points     RCRI Interpretation: 0 points: Class I (very low risk - 0.4% complication rate)         Signed Electronically by: Jerardo Navarrete MD  Copy of this evaluation report is provided to requesting physician.

## 2023-10-06 ENCOUNTER — ANESTHESIA EVENT (OUTPATIENT)
Dept: SURGERY | Facility: OTHER | Age: 48
End: 2023-10-06
Payer: COMMERCIAL

## 2023-10-09 ENCOUNTER — ANESTHESIA (OUTPATIENT)
Dept: SURGERY | Facility: OTHER | Age: 48
End: 2023-10-09
Payer: COMMERCIAL

## 2023-10-09 ENCOUNTER — HOSPITAL ENCOUNTER (OUTPATIENT)
Facility: OTHER | Age: 48
Discharge: HOME | End: 2023-10-09
Attending: ORTHOPAEDIC SURGERY | Admitting: ORTHOPAEDIC SURGERY
Payer: COMMERCIAL

## 2023-10-09 VITALS
HEART RATE: 55 BPM | TEMPERATURE: 97 F | DIASTOLIC BLOOD PRESSURE: 85 MMHG | RESPIRATION RATE: 41 BRPM | BODY MASS INDEX: 26.82 KG/M2 | WEIGHT: 149 LBS | OXYGEN SATURATION: 97 % | SYSTOLIC BLOOD PRESSURE: 136 MMHG

## 2023-10-09 DIAGNOSIS — M67.51 PLICA OF KNEE, RIGHT: Primary | ICD-10-CM

## 2023-10-09 PROCEDURE — 360N000076 HC SURGERY LEVEL 3, PER MIN: Performed by: ORTHOPAEDIC SURGERY

## 2023-10-09 PROCEDURE — 250N000011 HC RX IP 250 OP 636: Mod: JZ | Performed by: NURSE ANESTHETIST, CERTIFIED REGISTERED

## 2023-10-09 PROCEDURE — 250N000009 HC RX 250

## 2023-10-09 PROCEDURE — 250N000011 HC RX IP 250 OP 636: Performed by: ORTHOPAEDIC SURGERY

## 2023-10-09 PROCEDURE — 29875 ARTHRS KNEE SURG SYNVCT LMTD: CPT

## 2023-10-09 PROCEDURE — 250N000025 HC SEVOFLURANE, PER MIN: Performed by: ORTHOPAEDIC SURGERY

## 2023-10-09 PROCEDURE — 29875 ARTHRS KNEE SURG SYNVCT LMTD: CPT | Mod: RT | Performed by: ORTHOPAEDIC SURGERY

## 2023-10-09 PROCEDURE — 258N000003 HC RX IP 258 OP 636: Performed by: NURSE ANESTHETIST, CERTIFIED REGISTERED

## 2023-10-09 PROCEDURE — 272N000001 HC OR GENERAL SUPPLY STERILE: Performed by: ORTHOPAEDIC SURGERY

## 2023-10-09 PROCEDURE — 999N000141 HC STATISTIC PRE-PROCEDURE NURSING ASSESSMENT: Performed by: ORTHOPAEDIC SURGERY

## 2023-10-09 PROCEDURE — 370N000017 HC ANESTHESIA TECHNICAL FEE, PER MIN: Performed by: ORTHOPAEDIC SURGERY

## 2023-10-09 PROCEDURE — 710N000012 HC RECOVERY PHASE 2, PER MINUTE: Performed by: ORTHOPAEDIC SURGERY

## 2023-10-09 PROCEDURE — 250N000011 HC RX IP 250 OP 636

## 2023-10-09 PROCEDURE — 710N000010 HC RECOVERY PHASE 1, LEVEL 2, PER MIN: Performed by: ORTHOPAEDIC SURGERY

## 2023-10-09 PROCEDURE — 258N000001 HC RX 258: Performed by: ORTHOPAEDIC SURGERY

## 2023-10-09 RX ORDER — PROPOFOL 10 MG/ML
INJECTION, EMULSION INTRAVENOUS PRN
Status: DISCONTINUED | OUTPATIENT
Start: 2023-10-09 | End: 2023-10-09

## 2023-10-09 RX ORDER — ONDANSETRON 4 MG/1
4 TABLET, ORALLY DISINTEGRATING ORAL EVERY 30 MIN PRN
Status: DISCONTINUED | OUTPATIENT
Start: 2023-10-09 | End: 2023-10-09 | Stop reason: HOSPADM

## 2023-10-09 RX ORDER — FENTANYL CITRATE 50 UG/ML
50 INJECTION, SOLUTION INTRAMUSCULAR; INTRAVENOUS EVERY 5 MIN PRN
Status: DISCONTINUED | OUTPATIENT
Start: 2023-10-09 | End: 2023-10-09 | Stop reason: HOSPADM

## 2023-10-09 RX ORDER — DEXAMETHASONE SODIUM PHOSPHATE 4 MG/ML
INJECTION, SOLUTION INTRA-ARTICULAR; INTRALESIONAL; INTRAMUSCULAR; INTRAVENOUS; SOFT TISSUE PRN
Status: DISCONTINUED | OUTPATIENT
Start: 2023-10-09 | End: 2023-10-09

## 2023-10-09 RX ORDER — FENTANYL CITRATE 0.05 MG/ML
INJECTION, SOLUTION INTRAMUSCULAR; INTRAVENOUS PRN
Status: DISCONTINUED | OUTPATIENT
Start: 2023-10-09 | End: 2023-10-09

## 2023-10-09 RX ORDER — HYDROCODONE BITARTRATE AND ACETAMINOPHEN 5; 325 MG/1; MG/1
1 TABLET ORAL
Status: DISCONTINUED | OUTPATIENT
Start: 2023-10-09 | End: 2023-10-09 | Stop reason: HOSPADM

## 2023-10-09 RX ORDER — SODIUM CHLORIDE, SODIUM LACTATE, POTASSIUM CHLORIDE, CALCIUM CHLORIDE 600; 310; 30; 20 MG/100ML; MG/100ML; MG/100ML; MG/100ML
INJECTION, SOLUTION INTRAVENOUS CONTINUOUS
Status: DISCONTINUED | OUTPATIENT
Start: 2023-10-09 | End: 2023-10-09 | Stop reason: HOSPADM

## 2023-10-09 RX ORDER — DIPHENHYDRAMINE HYDROCHLORIDE 50 MG/ML
12.5 INJECTION INTRAMUSCULAR; INTRAVENOUS ONCE
Status: COMPLETED | OUTPATIENT
Start: 2023-10-09 | End: 2023-10-09

## 2023-10-09 RX ORDER — KETAMINE HYDROCHLORIDE 10 MG/ML
INJECTION INTRAMUSCULAR; INTRAVENOUS PRN
Status: DISCONTINUED | OUTPATIENT
Start: 2023-10-09 | End: 2023-10-09

## 2023-10-09 RX ORDER — CEFAZOLIN SODIUM/WATER 2 G/20 ML
2 SYRINGE (ML) INTRAVENOUS SEE ADMIN INSTRUCTIONS
Status: DISCONTINUED | OUTPATIENT
Start: 2023-10-09 | End: 2023-10-09 | Stop reason: HOSPADM

## 2023-10-09 RX ORDER — ONDANSETRON 2 MG/ML
4 INJECTION INTRAMUSCULAR; INTRAVENOUS EVERY 30 MIN PRN
Status: DISCONTINUED | OUTPATIENT
Start: 2023-10-09 | End: 2023-10-09 | Stop reason: HOSPADM

## 2023-10-09 RX ORDER — HYDROCODONE BITARTRATE AND ACETAMINOPHEN 5; 325 MG/1; MG/1
1-2 TABLET ORAL EVERY 4 HOURS PRN
Qty: 10 TABLET | Refills: 0 | Status: SHIPPED | OUTPATIENT
Start: 2023-10-09 | End: 2024-01-08

## 2023-10-09 RX ORDER — NALOXONE HYDROCHLORIDE 0.4 MG/ML
0.4 INJECTION, SOLUTION INTRAMUSCULAR; INTRAVENOUS; SUBCUTANEOUS
Status: DISCONTINUED | OUTPATIENT
Start: 2023-10-09 | End: 2023-10-09 | Stop reason: HOSPADM

## 2023-10-09 RX ORDER — NALOXONE HYDROCHLORIDE 0.4 MG/ML
0.2 INJECTION, SOLUTION INTRAMUSCULAR; INTRAVENOUS; SUBCUTANEOUS
Status: DISCONTINUED | OUTPATIENT
Start: 2023-10-09 | End: 2023-10-09 | Stop reason: HOSPADM

## 2023-10-09 RX ORDER — CEFAZOLIN SODIUM/WATER 2 G/20 ML
2 SYRINGE (ML) INTRAVENOUS
Status: COMPLETED | OUTPATIENT
Start: 2023-10-09 | End: 2023-10-09

## 2023-10-09 RX ORDER — IBUPROFEN 200 MG
600 TABLET ORAL
Status: DISCONTINUED | OUTPATIENT
Start: 2023-10-09 | End: 2023-10-09 | Stop reason: HOSPADM

## 2023-10-09 RX ORDER — ONDANSETRON 2 MG/ML
INJECTION INTRAMUSCULAR; INTRAVENOUS PRN
Status: DISCONTINUED | OUTPATIENT
Start: 2023-10-09 | End: 2023-10-09

## 2023-10-09 RX ORDER — BUPIVACAINE HYDROCHLORIDE 2.5 MG/ML
INJECTION, SOLUTION INFILTRATION; PERINEURAL PRN
Status: DISCONTINUED | OUTPATIENT
Start: 2023-10-09 | End: 2023-10-09 | Stop reason: HOSPADM

## 2023-10-09 RX ORDER — LIDOCAINE 40 MG/G
CREAM TOPICAL
Status: DISCONTINUED | OUTPATIENT
Start: 2023-10-09 | End: 2023-10-09 | Stop reason: HOSPADM

## 2023-10-09 RX ORDER — GLYCOPYRROLATE 0.2 MG/ML
INJECTION, SOLUTION INTRAMUSCULAR; INTRAVENOUS PRN
Status: DISCONTINUED | OUTPATIENT
Start: 2023-10-09 | End: 2023-10-09

## 2023-10-09 RX ORDER — LIDOCAINE HYDROCHLORIDE 20 MG/ML
INJECTION, SOLUTION INFILTRATION; PERINEURAL PRN
Status: DISCONTINUED | OUTPATIENT
Start: 2023-10-09 | End: 2023-10-09

## 2023-10-09 RX ADMIN — SODIUM CHLORIDE, POTASSIUM CHLORIDE, SODIUM LACTATE AND CALCIUM CHLORIDE: 600; 310; 30; 20 INJECTION, SOLUTION INTRAVENOUS at 11:16

## 2023-10-09 RX ADMIN — Medication 2 G: at 08:47

## 2023-10-09 RX ADMIN — LIDOCAINE HYDROCHLORIDE 20 MG: 20 INJECTION, SOLUTION INFILTRATION; PERINEURAL at 09:38

## 2023-10-09 RX ADMIN — SODIUM CHLORIDE, POTASSIUM CHLORIDE, SODIUM LACTATE AND CALCIUM CHLORIDE: 600; 310; 30; 20 INJECTION, SOLUTION INTRAVENOUS at 07:58

## 2023-10-09 RX ADMIN — GLYCOPYRROLATE 0.1 MG: 0.2 INJECTION, SOLUTION INTRAMUSCULAR; INTRAVENOUS at 09:43

## 2023-10-09 RX ADMIN — DIPHENHYDRAMINE HYDROCHLORIDE 12.5 MG: 50 INJECTION, SOLUTION INTRAMUSCULAR; INTRAVENOUS at 10:54

## 2023-10-09 RX ADMIN — FAMOTIDINE 20 MG: 10 INJECTION, SOLUTION INTRAVENOUS at 10:59

## 2023-10-09 RX ADMIN — GLYCOPYRROLATE 0.1 MG: 0.2 INJECTION, SOLUTION INTRAMUSCULAR; INTRAVENOUS at 09:21

## 2023-10-09 RX ADMIN — PROPOFOL 150 MG: 10 INJECTION, EMULSION INTRAVENOUS at 09:21

## 2023-10-09 RX ADMIN — LIDOCAINE HYDROCHLORIDE 80 MG: 20 INJECTION, SOLUTION INFILTRATION; PERINEURAL at 09:21

## 2023-10-09 RX ADMIN — FENTANYL CITRATE 50 MCG: 50 INJECTION, SOLUTION INTRAMUSCULAR; INTRAVENOUS at 10:28

## 2023-10-09 RX ADMIN — ONDANSETRON HYDROCHLORIDE 4 MG: 2 SOLUTION INTRAMUSCULAR; INTRAVENOUS at 09:25

## 2023-10-09 RX ADMIN — Medication 10 MG: at 09:38

## 2023-10-09 RX ADMIN — Medication 20 MG: at 09:21

## 2023-10-09 RX ADMIN — FENTANYL CITRATE 50 MCG: 0.05 INJECTION, SOLUTION INTRAMUSCULAR; INTRAVENOUS at 09:41

## 2023-10-09 RX ADMIN — DEXAMETHASONE SODIUM PHOSPHATE 4 MG: 4 INJECTION, SOLUTION INTRA-ARTICULAR; INTRALESIONAL; INTRAMUSCULAR; INTRAVENOUS; SOFT TISSUE at 09:25

## 2023-10-09 RX ADMIN — MIDAZOLAM HYDROCHLORIDE 2 MG: 1 INJECTION, SOLUTION INTRAMUSCULAR; INTRAVENOUS at 09:15

## 2023-10-09 ASSESSMENT — ACTIVITIES OF DAILY LIVING (ADL)
ADLS_ACUITY_SCORE: 35
ADLS_ACUITY_SCORE: 35

## 2023-10-09 ASSESSMENT — LIFESTYLE VARIABLES: TOBACCO_USE: 1

## 2023-10-09 NOTE — BRIEF OP NOTE
[facility]    Brief Operative Note    Pre-operative diagnosis: Internal derangement of right knee [M23.91]  Post-operative diagnosis Same as pre-operative diagnosis    Procedure: Arthroscopy knee, PARTIAL SYNOVECTOMY, Right - Knee    Surgeon: Surgeon(s) and Role:     * Daniel Vázquez MD - Primary     * Yuniel Lee PA - Assisting  Anesthesia: General   Estimated Blood Loss: Minimal    Drains: None  Specimens: * No specimens in log *  Findings:   None.  Complications: None.  Implants: * No implants in log *

## 2023-10-09 NOTE — OR NURSING
Patient has been discharged to home at 1205 via wheelchair accompanied by her family    Written discharge instructions were provided to patient.  Prescriptions were e-scribed to [facility].  Patient states their pain is tolerbale, and denies any nausea or dizziness upon discharge.    Patient and adult caring for them verbalize understanding of discharge instructions including no driving until tomorrow and no longer taking narcotic pain medications - no operating mechanical equipment and no making any important decisions.They understand reason for discharge, and necessary follow-up appointments.       Francesca Warren RN

## 2023-10-09 NOTE — DISCHARGE INSTRUCTIONS
Surgeon Contact Information  If you have questions or concerns related to your procedure please contact your surgeon through [facility] at [phone].     [facility]  Adult Discharge Orders & Instructions      For 24 hours after surgery:  Get plenty of rest.  A responsible adult must stay with you for at least 24 hours after you leave the hospital.   You may feel lightheaded.  IF so, sit for a few minutes before standing.  Have someone help you get up.   You may have a slight fever. Call the doctor if your fever is over 101 F (38.3 C) (taken under the tongue) or lasts longer than 24 hours.  You may have a dry mouth, a sore throat, muscle aches or trouble sleeping.  These should go away after 24 hours.  Do not make important or legal decisions.  6.   Do not drive or use heavy equipment.  If you have weakness or tingling, don't drive or use heavy equipment until this feeling goes away.                                                                                                                                                                           To contact a doctor, call    [phone]______________

## 2023-10-09 NOTE — OR NURSING
PACU Transfer Note    Mallorie Machado was transferred to DSU via bed.  Equipment used for transport:  none  Accompanied by:  Georgette  Prescriptions were: sent to [facility]    PACU Respiratory Event Documentation     1) Episodes of Apnea greater than or equal to 10 seconds: 0    2) Bradypnea - less than 8 breaths per minute: 0    3) Pain score on 0 to 10 scale: 2    4) Pain-sedation mismatch (yes or no): no    5) Repeated 02 desaturation less than 90% (yes or no): no    Anesthesia notified? (yes or no): no    Any of the above events occuring repeatedly in separate 30 minute intervals may be considered recurrent PACU respiratory events.    Patient stable and meets phase 1 discharge criteria for transport from PACU.

## 2023-10-09 NOTE — ANESTHESIA PREPROCEDURE EVALUATION
Anesthesia Pre-Procedure Evaluation    Patient: Mallorie Machado   MRN: [id number] : 1975        Procedure : Procedure(s):  Arthroscopy knee          Past Medical History:   Diagnosis Date    Allergic rhinitis due to pollen     No Comments Provided    Cyst of ovary         Dermatitis     scalp    Dysmenorrhea     No Comments Provided    Dysthymic disorder     No Comments Provided    Endometriosis     No Comments Provided    Hypothyroidism     No Comments Provided    Injury of lower back     ,Cervical disc injury, C5-6, with improvement (work comp related)    Low back pain     No Comments Provided    Obesity     No Comments Provided    Other cervical disc degeneration, unspecified cervical region     No Comments Provided    Other cervical disc displacement, unspecified cervical region     No Comments Provided    Personal history of other diseases of the female genital tract     G2, P2-0-0-2, vaginal deliveries    Uncomplicated asthma     No Comments Provided      Past Surgical History:   Procedure Laterality Date    ATTEMPTED ARTHROSCOPY      06,left wrist arthroscopy with debridement - Dr. Mahmood, [facility]    COLONOSCOPY  2023    F/U  normal    COLONOSCOPY N/A 2023    Procedure: Colonoscopy;  Surgeon: Dorian Schrader MD;  Location: [facility] OR    FUSION CERVICAL ANTERIOR ONE LEVEL      ,C5-6    HYSTERECTOMY TOTAL ABDOMINAL, BILATERAL SALPINGO-OOPHORECTOMY, COMBINED      ,bilateral salpingo-oophorectomy    LAPAROSCOPY DIAGNOSTIC (GENERAL)      ,    OTHER SURGICAL HISTORY       x 2,OPY338,VAGINAL DELIVERY    OTHER SURGICAL HISTORY      2012,2054,REMOVAL OF FOREIGN BODY,ACDF C4-5 and C6-7 with hardware removal [Other][[[      Allergies   Allergen Reactions    Propoxyphene N-Apap Anaphylaxis     Other reaction(s): Throat Swelling/Closing    Amoxil [Amoxicillin] Other (See Comments), Nausea and Vomiting and GI Disturbance     Lethargic     Hydrocodone-Acetaminophen Itching and Nausea    Meperidine Itching     No hives    Morphine      Other reaction(s): Erythema  Face & hands    Oxycodone-Acetaminophen Itching      Social History     Tobacco Use    Smoking status: Every Day     Packs/day: 0.75     Years: 24.00     Pack years: 18.00     Types: Cigarettes    Smokeless tobacco: Never   Substance Use Topics    Alcohol use: Not Currently     Alcohol/week: 0.0 standard drinks of alcohol      Wt Readings from Last 1 Encounters:   10/09/23 67.6 kg (149 lb)        Anesthesia Evaluation            ROS/MED HX  ENT/Pulmonary:     (+)                tobacco use, Current use,                      Neurologic:  - neg neurologic ROS     Cardiovascular:       METS/Exercise Tolerance: >4 METS    Hematologic:  - neg hematologic  ROS     Musculoskeletal:  - neg musculoskeletal ROS     GI/Hepatic:  - neg GI/hepatic ROS     Renal/Genitourinary:  - neg Renal ROS     Endo:     (+)          thyroid problem, hypothyroidism,           Psychiatric/Substance Use:  - neg psychiatric ROS     Infectious Disease:  - neg infectious disease ROS     Malignancy:  - neg malignancy ROS     Other:  - neg other ROS          Physical Exam    Airway        Mallampati: I   TM distance: > 3 FB   Neck ROM: full   Mouth opening: > 3 cm    Respiratory Devices and Support         Dental       (+) Minor Abnormalities - some fillings, tiny chips      Cardiovascular   cardiovascular exam normal       Rhythm and rate: regular and normal     Pulmonary   pulmonary exam normal        breath sounds clear to auscultation           OUTSIDE LABS:  CBC:   Lab Results   Component Value Date    WBC 6.9 09/29/2023    WBC 4.7 12/06/2021    HGB 14.5 09/29/2023    HGB 16.6 (H) 12/06/2021    HCT 42.5 09/29/2023    HCT 48.3 (H) 12/06/2021     09/29/2023     12/06/2021     BMP:   Lab Results   Component Value Date     12/06/2021     04/22/2015    POTASSIUM 4.1 12/06/2021    POTASSIUM 3.8  04/22/2015    CHLORIDE 104 12/06/2021    CHLORIDE 98 04/22/2015    CO2 26 12/06/2021    CO2 33 (H) 04/22/2015    BUN 19 12/06/2021    BUN 14 04/22/2015    CR 0.65 12/06/2021    CR 0.96 04/22/2015     (H) 12/06/2021    GLC 85 08/13/2019     COAGS: No results found for: PTT, INR, FIBR  POC: No results found for: BGM, HCG, HCGS  HEPATIC:   Lab Results   Component Value Date    ALBUMIN 4.4 12/06/2021    PROTTOTAL 7.4 12/06/2021    ALT 14 12/06/2021    AST 14 12/06/2021    ALKPHOS 39 12/06/2021    BILITOTAL 0.4 12/06/2021     OTHER:   Lab Results   Component Value Date    A1C 6.0 08/13/2019    AVERY 9.2 12/06/2021    TSH 0.25 (L) 09/29/2023       Anesthesia Plan    ASA Status:  2    NPO Status:  NPO Appropriate    Anesthesia Type: General.     - Airway: LMA   Induction: Intravenous.   Maintenance: Balanced.        Consents    Anesthesia Plan(s) and associated risks, benefits, and realistic alternatives discussed. Questions answered and patient/representative(s) expressed understanding.     - Discussed: Risks, Benefits and Alternatives for BOTH SEDATION and the PROCEDURE were discussed     - Discussed with:  Patient            Postoperative Care    Pain management: IV analgesics, Multi-modal analgesia.   PONV prophylaxis: Ondansetron (or other 5HT-3), Dexamethasone or Solumedrol     Comments:                UMA NOBLE CRNA

## 2023-10-09 NOTE — ANESTHESIA POSTPROCEDURE EVALUATION
Patient: Mallorie Machado    Procedure: Procedure(s):  Arthroscopy knee, PARTIAL SYNOVECTOMY       Anesthesia Type:  General    Note:  Disposition: Outpatient   Postop Pain Control: Uneventful            Sign Out: Well controlled pain   PONV: No   Neuro/Psych: Uneventful            Sign Out: Acceptable/Baseline neuro status   Airway/Respiratory: Uneventful            Sign Out: Acceptable/Baseline resp. status   CV/Hemodynamics: Uneventful            Sign Out: Acceptable CV status; No obvious hypovolemia; No obvious fluid overload   Other NRE: NONE   DID A NON-ROUTINE EVENT OCCUR? No           Last vitals:  Vitals Value Taken Time   /83 10/09/23 1115   Temp 97  F (36.1  C) 10/09/23 1034   Pulse 57 10/09/23 1115   Resp 9 10/09/23 1115   SpO2 95 % 10/09/23 1115   Vitals shown include unvalidated device data.    Electronically Signed By: UMA Aguirre CRNA  October 9, 2023  12:46 PM

## 2023-10-09 NOTE — ANESTHESIA PROCEDURE NOTES
Airway         Procedure Start/Stop Times: 10/9/2023 9:20 AM  Staff -        CRNA: Braden Torres APRN CRNA       Performed By: CRNA  Consent for Airway        Urgency: elective  Indications and Patient Condition       Indications for airway management: jimi-procedural       Induction type:intravenous       Mask difficulty assessment: 1 - vent by mask    Final Airway Details       Final airway type: supraglottic airway    Supraglottic Airway Details        Type: LMA       Brand: I-Gel       LMA size: 3    Post intubation assessment        Placement verified by: capnometry, equal breath sounds and chest rise        Number of attempts at approach: 1       Number of other approaches attempted: 0       Secured with: cloth tape       Ease of procedure: easy       Dentition: Unchanged and Intact    Medication(s) Administered   Medication Administration Time: 10/9/2023 9:20 AM

## 2023-10-09 NOTE — INTERVAL H&P NOTE
"I have reviewed the surgical (or preoperative) H&P that is linked to this encounter, and examined the patient. There are no significant changes    Clinical Conditions Present on Arrival:  Clinically Significant Risk Factors Present on Admission                  # Overweight: Estimated body mass index is 26.82 kg/m  as calculated from the following:    Height as of 9/29/23: 1.588 m (5' 2.5\").    Weight as of this encounter: 67.6 kg (149 lb).       "

## 2023-10-09 NOTE — ANESTHESIA CARE TRANSFER NOTE
Patient: Mallorie Machado    Procedure: Procedure(s):  Arthroscopy knee, PARTIAL SYNOVECTOMY       Diagnosis: Internal derangement of right knee [M23.91]  Diagnosis Additional Information: No value filed.    Anesthesia Type:   General     Note:    Oropharynx: oropharynx clear of all foreign objects and spontaneously breathing  Level of Consciousness: drowsy  Oxygen Supplementation: room air    Independent Airway: airway patency satisfactory and stable  Dentition: dentition unchanged  Vital Signs Stable: post-procedure vital signs reviewed and stable  Report to RN Given: handoff report given  Patient transferred to: PACU    Handoff Report: Identifed the Patient, Identified the Reponsible Provider, Reviewed the pertinent medical history, Discussed the surgical course, Reviewed Intra-OP anesthesia mangement and issues during anesthesia, Set expectations for post-procedure period and Allowed opportunity for questions and acknowledgement of understanding      Vitals:  Vitals Value Taken Time   /77 10/09/23 1014   Temp     Pulse 70 10/09/23 1016   Resp 8 10/09/23 1016   SpO2 93 % 10/09/23 1016   Vitals shown include unvalidated device data.    Electronically Signed By: UMA Aguirre CRNA  October 9, 2023  10:17 AM

## 2023-10-09 NOTE — OP NOTE
Preop Dx: Right knee plica    Postop Dx: Same    Procedure: Right knee limited synovectomy    Surgeon: Daniel Vázquez MD    Assistant: NADEEM Foy/RN    Anesthesia: General    Indications: Patient is a 47-year-old female with judy-lateral knee pain.  She wanted this definitively treated.  MRI was equivocal on the cause of her pain as it did not appear that she had a significant meniscus tear in the lateral compartment.  I was more concerned about a plica clinically and by history.  We decided that an arthroscopy was probably the right choice for her.  All the risk and benefits surgical invention were discussed with her and she wished to proceed.    Description: Patient taken the operating room and after adequate duction anesthesia was positioned, prepped and draped in usual fashion the operative table.  Universal protocol was initiated once on room in agreement incision was made lateral to the patella.  The scope trocar and camera advanced into the suprapatellar pouch and trocar was far for the camera.  The suprapatellar pouch was clear of debris.  The lateral gutter showed a little bit of debris.  The lateral compartment appeared to have some slight grade 1 wear.  There is no meniscus tear appreciated initially.  The ACL was intact.  The medial compartment was pristine.  Medial portal was created via the localization of the camera is moved to this position for viewing of the lateral compartment in its entirety as this is where we are most concerned about the tear.  The entirety of the meniscus was probed and found to be intact.  We then went back up into the suprapatellar pouch and looked back at the patellofemoral joint which showed minimal cartilage wear.  There was however fraying on the medial and lateral femoral condyles indicating possible plica's.  We then performed a limited synovectomy in the region of lateral femoral condyle anteriorly.  We removed quite a bit of synovium there and though there  was no obvious plica there did appear to be inflamed tissue here.  We removed that.  We also did a partial synovectomy anterior to the medial femoral condyle as there was wear on the medial femoral condyle here as well.  Once we were satisfied that we had removed enough synovium this concluded the arthroscopy.  4-0 nylon was used in a buried very fashion close the skin of both portals.  A sterile dressing was applied and she was taken in stable condition to postanesthesia care unit.

## 2023-10-16 ENCOUNTER — OFFICE VISIT (OUTPATIENT)
Dept: ORTHOPEDICS | Facility: OTHER | Age: 48
End: 2023-10-16
Attending: ORTHOPAEDIC SURGERY
Payer: COMMERCIAL

## 2023-10-16 DIAGNOSIS — M67.51 PLICA OF KNEE, RIGHT: Primary | ICD-10-CM

## 2023-10-16 PROCEDURE — 99024 POSTOP FOLLOW-UP VISIT: CPT | Performed by: ORTHOPAEDIC SURGERY

## 2023-10-16 NOTE — PROGRESS NOTES
Subjective:    47-year-old female status post right knee arthroscopy with limited synovectomy.  She states that she can definitely tell that something was done now only 7 days out.  But her knee feels better than it did prior to surgery    Objective:    On examination her wounds are healing nicely sutures removed Steri-Strips were applied today her knee is completely benign appearing otherwise    Assessment:    47-year-old female status post limited synovectomy of her right knee    Plan:    We will see her back on an as-needed basis.  Her knee should just continue to improve at this point

## 2023-10-30 ENCOUNTER — HOSPITAL ENCOUNTER (OUTPATIENT)
Dept: CARDIOLOGY | Facility: OTHER | Age: 48
Discharge: HOME | End: 2023-10-30
Attending: FAMILY MEDICINE | Admitting: FAMILY MEDICINE
Payer: COMMERCIAL

## 2023-10-30 DIAGNOSIS — R01.1 UNDIAGNOSED CARDIAC MURMURS: ICD-10-CM

## 2023-10-30 LAB — LVEF ECHO: NORMAL

## 2023-10-30 PROCEDURE — 93306 TTE W/DOPPLER COMPLETE: CPT

## 2023-10-30 PROCEDURE — 93306 TTE W/DOPPLER COMPLETE: CPT | Mod: 26 | Performed by: INTERNAL MEDICINE

## 2023-11-23 ENCOUNTER — MYC MEDICAL ADVICE (OUTPATIENT)
Dept: FAMILY MEDICINE | Facility: OTHER | Age: 48
End: 2023-11-23
Payer: COMMERCIAL

## 2023-11-23 DIAGNOSIS — E78.00 HYPERCHOLESTEREMIA: Primary | ICD-10-CM

## 2023-11-24 RX ORDER — ATORVASTATIN CALCIUM 10 MG/1
10 TABLET, FILM COATED ORAL DAILY
Qty: 90 TABLET | Refills: 4 | Status: SHIPPED | OUTPATIENT
Start: 2023-11-24 | End: 2024-01-08 | Stop reason: ALTCHOICE

## 2023-12-04 ENCOUNTER — MYC MEDICAL ADVICE (OUTPATIENT)
Dept: FAMILY MEDICINE | Facility: OTHER | Age: 48
End: 2023-12-04
Payer: COMMERCIAL

## 2024-01-08 ENCOUNTER — OFFICE VISIT (OUTPATIENT)
Dept: FAMILY MEDICINE | Facility: OTHER | Age: 49
End: 2024-01-08
Attending: PHYSICIAN ASSISTANT
Payer: COMMERCIAL

## 2024-01-08 VITALS
HEART RATE: 64 BPM | RESPIRATION RATE: 20 BRPM | HEIGHT: 63 IN | BODY MASS INDEX: 27.46 KG/M2 | TEMPERATURE: 97.6 F | OXYGEN SATURATION: 96 % | WEIGHT: 155 LBS | SYSTOLIC BLOOD PRESSURE: 138 MMHG | DIASTOLIC BLOOD PRESSURE: 78 MMHG

## 2024-01-08 DIAGNOSIS — E78.00 HYPERCHOLESTEREMIA: ICD-10-CM

## 2024-01-08 DIAGNOSIS — S83.261D PERIPHERAL TEAR OF LATERAL MENISCUS OF RIGHT KNEE AS CURRENT INJURY, SUBSEQUENT ENCOUNTER: Primary | ICD-10-CM

## 2024-01-08 DIAGNOSIS — M25.561 ACUTE PAIN OF RIGHT KNEE: ICD-10-CM

## 2024-01-08 PROCEDURE — 99213 OFFICE O/P EST LOW 20 MIN: CPT | Performed by: PHYSICIAN ASSISTANT

## 2024-01-08 RX ORDER — PRAVASTATIN SODIUM 10 MG
10 TABLET ORAL DAILY
Qty: 90 TABLET | Refills: 3 | Status: SHIPPED | OUTPATIENT
Start: 2024-01-08 | End: 2024-06-10

## 2024-01-08 RX ORDER — CELECOXIB 100 MG/1
100 CAPSULE ORAL 2 TIMES DAILY
Qty: 180 CAPSULE | Refills: 3 | Status: SHIPPED | OUTPATIENT
Start: 2024-01-08 | End: 2024-08-28

## 2024-01-08 ASSESSMENT — PAIN SCALES - GENERAL: PAINLEVEL: WORST PAIN (10)

## 2024-01-08 NOTE — PROGRESS NOTES
Assessment & Plan       ICD-10-CM    1. Peripheral tear of lateral meniscus of right knee as current injury, subsequent encounter  S83.261D Orthopedic  Referral     celecoxib (CELEBREX) 100 MG capsule      2. Acute pain of right knee  M25.561 Orthopedic  Referral     celecoxib (CELEBREX) 100 MG capsule      3. Hypercholesteremia  E78.00 pravastatin (PRAVACHOL) 10 MG tablet        Peripheral tear of lateral meniscus of the right knee, she has an MRI with consistent findings back in May 2023.  She underwent arthroscopy on 10/9/2023, unfortunately, she has had progressive pain since this time.  I recommend an external orthopedic referral for further evaluation, she is in agreement with this.  To help manage her pain we discussed different NSAID options such as meloxicam, Celebrex and other NSAIDs that her longer acting in nature.  She is aware that these may cause transient elevations in her blood pressure and we will have to monitor this closely.  She has previously been on Celebrex and has done quite well, after discussing risk, benefits potential side effects of medication we opted to restart Celebrex.  She will be prescribed 100 mg by mouth twice daily with meals.  She will avoid additional NSAIDs such as ibuprofen, naproxen, Aleve but is able to alternate Celebrex with Tylenol as long as she does not exceed 4000 mg daily.  Continue with topical such as IcyHot, Bengay and Voltaren.  Mallorie will reach out to [facility] orthopedics to schedule her consultation.  Acute on chronic pain of right knee, this has been progressive unfortunately since surgery, referral to orthopedics and pain management are indicated.  See 1.  Hypercholesterolemia, started on statin (Lipitor 10 mg) in November 2023, unfortunately, she developed myalgias and arthralgias after starting Lipitor.  We will switch her to a different statin, opted for pravastatin 10 mg daily as this is much less likely to cause arthralgias/myalgias.   "She will take this at nighttime.  New prescription sent through to pharmacy.     BMI:   Estimated body mass index is 27.9 kg/m  as calculated from the following:    Height as of this encounter: 1.588 m (5' 2.5\").    Weight as of this encounter: 70.3 kg (155 lb).   Weight management plan: Discussed healthy diet and exercise guidelines    See Patient Instructions    Return for Referral placed, contact referral location.    Melinda Adams PA-C  [facility]    Subjective   Mallorie is a 48 year old, presenting for the following health issues:  Musculoskeletal Problem        1/8/2024     2:35 PM   Additional Questions   Roomed by vivien rosenthal lpn       History of Present Illness       Hyperlipidemia:  She presents for follow up of hyperlipidemia.   She is not taking medication to lower cholesterol. She is having myalgia or other side effects to statin medications.    Reason for visit:  Knee pain and BP check  Symptom onset:  More than a month  Symptom intensity:  Moderate  Symptom progression:  Worsening    She eats 0-1 servings of fruits and vegetables daily.She consumes 0 sweetened beverage(s) daily.She exercises with enough effort to increase her heart rate 9 or less minutes per day.  She exercises with enough effort to increase her heart rate 3 or less days per week.   She is taking medications regularly.     Mallorie presents to clinic today to discuss the following:  Right sided knee pain, this started earlier last year (2023), she had x-rays obtained by her primary care provider, these showed \"a small enthesophyte at the quadriceps tendon insertion\" per radiology provider, Dr. Gerald MD. Due to ongoing locking with flexion, she did undergo an MRI on 5/25/2023.  MRI showed showed \"anterior horn meniscal tear with a large adjacent septated parameniscal cyst\", per radiology, Dr. Cash.  Mallorie then with orthopedics on 6/26/2023, was recommended she undergo a knee arthroscopy to debride the meniscus " "tear and to assess for other underlying pathology.  She underwent a right knee arthroscopy with synovectomy on 10/9/2023.  She states initially she felt good for the first 7 days and then became gradually more sore and felt as if she was worsening.  She feels as if there is a stress ball like sensation behind her right knee.  She also has pain over the front of her knee and a jabbing sensation of knee the kneecap.  She has pain that radiates up from her shin, to her knee And upper thigh.  She does still have ongoing instability and mechanical symptoms.  She has trialed ice, heat, topical rubs, patches and Aleve.  She did have to stop Aleve as this caused transient blood pressure elevation.  Hyperlipidemia, she was prescribed Lipitor 10 mg daily back in late November 2023.  She states she took this for about a month and then stopped as she had diffuse myalgias and arthralgias secondary to what she thought was the medication.  She is wondering on next steps.  She continues to work on exercise (difficult due to knee) and heart healthy diet.     Review of Systems   Constitutional, HEENT, cardiovascular, pulmonary, GI, , musculoskeletal, neuro, skin, endocrine and psych systems are negative, except as otherwise noted.        Objective    /78 (BP Location: Left arm, Patient Position: Sitting, Cuff Size: Adult Large)   Pulse 64   Temp 97.6  F (36.4  C) (Tympanic)   Resp 20   Ht 1.588 m (5' 2.5\")   Wt 70.3 kg (155 lb)   LMP 10/01/2004 (Approximate)   SpO2 96%   BMI 27.90 kg/m    Body mass index is 27.9 kg/m .  Physical Exam   GENERAL: healthy, alert and no distress  EYES: Eyes grossly normal to inspection, PERRL and conjunctivae and sclerae normal  RESP: lungs clear to auscultation - no rales, rhonchi or wheezes  CV: regular rate and rhythm, normal S1 S2, no S3 or S4, no murmur, click or rub, no peripheral edema and peripheral pulses strong  MS:   Bilateral Knee Examination:  Gait: normal gait with no signs " of guarding or compensation. Patient is able to get up and go from a seated position, in order to ambulate.    Appearance of the RIGHT knee: Skin is clean, dry, and non-ecchymotic. Effusion mild. Tenderness to palpation anterior medial and lateral joint lines.  Patellar tracking is normal. ROM: flexion 110, extension 0. Stable to varus, valgus, Lachman, A&P drawer ligamentous stressing. Nazia borderline positive, guarding. Extension strength 5/5. Neurovascular status, distal pulses and sensation intact.    Appearance of the LEFT knee: Skin is clean, dry, and non-ecchymotic. Effusion none. Tenderness to palpation none.  Patellar tracking is normal. ROM: flexion 125, extension 0.  Stable to varus, valgus, Lachman, A&P drawer ligamentous stressing. Nazia negative. Extension strength 5/5. Neurovascular status, distal pulses and sensation intact.  NEURO: Normal strength and tone, mentation intact and speech normal  PSYCH: mentation appears normal, affect normal/bright

## 2024-01-08 NOTE — NURSING NOTE
"Patient presents to the clinic for right knee follow up with a new ortho referral.    FOOD SECURITY SCREENING QUESTIONS:    The next two questions are to help us understand your food security.  If you are feeling you need any assistance in this area, we have resources available to support you today.    Hunger Vital Signs:  Within the past 12 months we worried whether our food would run out before we got money to buy more. Never  Within the past 12 months the food we bought just didn't last and we didn't have money to get more. Never    Advance Care Directive on file? no  Advance Care Directive provided to patient? Declined.      Chief Complaint   Patient presents with    Musculoskeletal Problem       Initial /78 (BP Location: Left arm, Patient Position: Sitting, Cuff Size: Adult Large)   Pulse 64   Temp 97.6  F (36.4  C) (Tympanic)   Resp 20   Ht 1.588 m (5' 2.5\")   Wt 70.3 kg (155 lb)   LMP 10/01/2004 (Approximate)   SpO2 96%   BMI 27.90 kg/m   Estimated body mass index is 27.9 kg/m  as calculated from the following:    Height as of this encounter: 1.588 m (5' 2.5\").    Weight as of this encounter: 70.3 kg (155 lb).  Medication Reconciliation: complete        Cynthia Lynch LPN     "

## 2024-01-30 ENCOUNTER — TELEPHONE (OUTPATIENT)
Dept: ALLERGY | Facility: OTHER | Age: 49
End: 2024-01-30

## 2024-01-30 NOTE — TELEPHONE ENCOUNTER
Left patient voicemail requesting her to call us back as we have an update about the MQT allergy skin testing.

## 2024-01-31 ENCOUNTER — TRANSFERRED RECORDS (OUTPATIENT)
Dept: HEALTH INFORMATION MANAGEMENT | Facility: OTHER | Age: 49
End: 2024-01-31
Payer: COMMERCIAL

## 2024-03-14 ENCOUNTER — TRANSFERRED RECORDS (OUTPATIENT)
Dept: HEALTH INFORMATION MANAGEMENT | Facility: OTHER | Age: 49
End: 2024-03-14
Payer: COMMERCIAL

## 2024-05-01 ENCOUNTER — OFFICE VISIT (OUTPATIENT)
Dept: FAMILY MEDICINE | Facility: OTHER | Age: 49
End: 2024-05-01
Payer: COMMERCIAL

## 2024-05-01 VITALS
TEMPERATURE: 97.5 F | OXYGEN SATURATION: 97 % | HEART RATE: 67 BPM | BODY MASS INDEX: 28.53 KG/M2 | WEIGHT: 161 LBS | RESPIRATION RATE: 18 BRPM | HEIGHT: 63 IN | DIASTOLIC BLOOD PRESSURE: 64 MMHG | SYSTOLIC BLOOD PRESSURE: 122 MMHG

## 2024-05-01 DIAGNOSIS — R10.9 FLANK PAIN: ICD-10-CM

## 2024-05-01 DIAGNOSIS — N30.00 ACUTE CYSTITIS WITHOUT HEMATURIA: Primary | ICD-10-CM

## 2024-05-01 LAB
ALBUMIN UR-MCNC: NEGATIVE MG/DL
APPEARANCE UR: CLEAR
BILIRUB UR QL STRIP: NEGATIVE
COLOR UR AUTO: YELLOW
GLUCOSE UR STRIP-MCNC: NEGATIVE MG/DL
HGB UR QL STRIP: ABNORMAL
KETONES UR STRIP-MCNC: NEGATIVE MG/DL
LEUKOCYTE ESTERASE UR QL STRIP: ABNORMAL
MUCOUS THREADS #/AREA URNS LPF: PRESENT /LPF
NITRATE UR QL: NEGATIVE
PH UR STRIP: 5 [PH] (ref 5–9)
RBC URINE: 1 /HPF
SP GR UR STRIP: 1.01 (ref 1–1.03)
UROBILINOGEN UR STRIP-MCNC: NORMAL MG/DL
WBC URINE: 1 /HPF

## 2024-05-01 PROCEDURE — 81001 URINALYSIS AUTO W/SCOPE: CPT | Mod: ZL

## 2024-05-01 PROCEDURE — 99213 OFFICE O/P EST LOW 20 MIN: CPT

## 2024-05-01 PROCEDURE — 87086 URINE CULTURE/COLONY COUNT: CPT | Mod: ZL

## 2024-05-01 RX ORDER — SULFAMETHOXAZOLE/TRIMETHOPRIM 800-160 MG
1 TABLET ORAL 2 TIMES DAILY
Qty: 6 TABLET | Refills: 0 | Status: SHIPPED | OUTPATIENT
Start: 2024-05-01 | End: 2024-05-04

## 2024-05-01 ASSESSMENT — PAIN SCALES - GENERAL: PAINLEVEL: MODERATE PAIN (5)

## 2024-05-01 NOTE — PROGRESS NOTES
ASSESSMENT/PLAN:    I have reviewed the nursing notes.  I have reviewed the findings, diagnosis, plan and need for follow up with the patient.    1. Acute cystitis without hematuria  2. Flank pain  - UA Macroscopic with reflex to Microscopic and Culture  - Urine Culture  - sulfamethoxazole-trimethoprim (BACTRIM DS) 800-160 MG tablet; Take 1 tablet by mouth 2 times daily for 3 days  Dispense: 6 tablet; Refill: 0    Patient presents with flank pain.  Patient's vitals are stable and she appears nontoxic.  Urinalysis was positive for a moderate amount of leukocyte esterase.  Will treat for UTI with Bactrim.  Urine culture is pending and patient will be notified of the results, antibiotics will be changed if needed based off of culture and sensitivity.  May take Tylenol and ibuprofen as needed.  Advised patient to push fluids.    Discussed warning signs/symptoms indicative of need to f/u    Follow up if symptoms persist or worsen or concerns    I explained my diagnostic considerations and recommendations to the patient, who voiced understanding and agreement with the treatment plan. All questions were answered. We discussed potential side effects of any prescribed or recommended therapies, as well as expectations for response to treatments.    Malcolm Doyle, UMA CNP  5/1/2024  4:56 PM    HPI:    Mallorie Machado is a 48 year old female who presents to [facility] today for concerns of back pain and bloating    Patient presents with concerns of possible UTI, x 1 day    Symptoms: urgency, back pain, and bloating  Flank Pain or Back Pain: Yes  Blood in Urine: No  Last Urination: in clinic  Able to Completely Urinate/Void: Yes  Prior UTIs: Yes  Exposures to STIs/STDs: No  Fevers, chills, N/V/D: No  Change in Bowel Habits: No  Vaginal Symptoms or Discharge: No  Recent swimming/use of hot tubs/swimming pools/lakes: No    LMP: hx of hysterectomy    Treatments tried: ibuprofen    Denies CP, SOB, calf tenderness. No new  medications. Denies exposure to ill or COVID positive patients.     Allergies: reviewed    PCP: Landon    Past Medical History:   Diagnosis Date    Allergic rhinitis due to pollen     No Comments Provided    Cyst of ovary     1994    Dermatitis     scalp    Dysmenorrhea     No Comments Provided    Dysthymic disorder     No Comments Provided    Endometriosis     No Comments Provided    Hypothyroidism     No Comments Provided    Injury of lower back     02/07,Cervical disc injury, C5-6, with improvement (work comp related)    Low back pain     No Comments Provided    Obesity     No Comments Provided    Other cervical disc degeneration, unspecified cervical region     No Comments Provided    Other cervical disc displacement, unspecified cervical region     No Comments Provided    Personal history of other diseases of the female genital tract     G2, P2-0-0-2, vaginal deliveries    Uncomplicated asthma     No Comments Provided     Past Surgical History:   Procedure Laterality Date    ARTHROSCOPY KNEE Right 10/9/2023    Procedure: Arthroscopy knee, PARTIAL SYNOVECTOMY;  Surgeon: Daniel Vázquez MD;  Location: [facility] OR    ATTEMPTED ARTHROSCOPY      1/20/06,left wrist arthroscopy with debridement - Dr. Mahmood, [facility] [place]    COLONOSCOPY  08/28/2023    F/U 2033 normal    COLONOSCOPY N/A 8/28/2023    Procedure: Colonoscopy;  Surgeon: Dorian Schrader MD;  Location: [facility] OR    FUSION CERVICAL ANTERIOR ONE LEVEL      2008,C5-6    HYSTERECTOMY TOTAL ABDOMINAL, BILATERAL SALPINGO-OOPHORECTOMY, COMBINED      11/04,bilateral salpingo-oophorectomy    LAPAROSCOPY DIAGNOSTIC (GENERAL)      6/04,02/07    OTHER SURGICAL HISTORY       x 2,[id number],VAGINAL DELIVERY    OTHER SURGICAL HISTORY      1/19/2012,205448,REMOVAL OF FOREIGN BODY,ACDF C4-5 and C6-7 with hardware removal [Other][[[     Social History     Tobacco Use    Smoking status: Every Day     Current packs/day: 0.75     Average packs/day: 0.8 packs/day for 24.0 years (18.0 ttl pk-yrs)  "    Types: Cigarettes    Smokeless tobacco: Never   Substance Use Topics    Alcohol use: Not Currently     Alcohol/week: 0.0 standard drinks of alcohol     Current Outpatient Medications   Medication Sig Dispense Refill    Cetirizine HCl (ZYRTEC ALLERGY PO) Take by mouth daily as needed      levothyroxine (SYNTHROID/LEVOTHROID) 112 MCG tablet Take 1 tablet (112 mcg) by mouth daily 90 tablet 4    montelukast (SINGULAIR) 10 MG tablet Take 1 tablet (10 mg) by mouth nightly as needed (allergies) 90 tablet 4    pravastatin (PRAVACHOL) 10 MG tablet Take 1 tablet (10 mg) by mouth daily 90 tablet 3    celecoxib (CELEBREX) 100 MG capsule Take 1 capsule (100 mg) by mouth 2 times daily (Patient not taking: Reported on 5/1/2024) 180 capsule 3     Allergies   Allergen Reactions    Propoxyphene N-Apap Anaphylaxis     Other reaction(s): Throat Swelling/Closing    Amoxil [Amoxicillin] Other (See Comments), Nausea and Vomiting and GI Disturbance     Lethargic    Atorvastatin Muscle Pain (Myalgia)     Myalgias    Hydrocodone-Acetaminophen Itching and Nausea    Meperidine Itching     No hives    Morphine      Other reaction(s): Erythema  Face & hands    Morphine And Related Itching and Other (See Comments)     Face & hands      Other reaction(s): Erythema Face & hands    Oxycodone-Acetaminophen Itching     Past medical history, past surgical history, current medications and allergies reviewed and accurate to the best of my knowledge.      ROS:  Refer to HPI    /64 (BP Location: Right arm, Patient Position: Sitting, Cuff Size: Adult Regular)   Pulse 67   Temp 97.5  F (36.4  C) (Tympanic)   Resp 18   Ht 1.588 m (5' 2.5\")   Wt 73 kg (161 lb)   LMP 10/01/2004 (Approximate)   SpO2 97%   BMI 28.98 kg/m      EXAM:  General Appearance: Well appearing 48 year old female, appropriate appearance for age. No acute distress   Respiratory: normal chest wall and respirations.  Normal effort.  Clear to auscultation bilaterally, no " wheezing, crackles or rhonchi.  No increased work of breathing.  No cough appreciated.  Cardiac: RRR with no murmurs  :  No suprapubic tenderness to palpation.  Absent CVA tenderness to palpation.    Musculoskeletal:  Equal movement of bilateral upper extremities.  Equal movement of bilateral lower extremities.  Normal gait.    Dermatological: no rashes noted of exposed skin  Neuro: Alert and oriented to person, place, and time.    Psychological: normal affect, alert, oriented, and pleasant.     Labs:  Results for orders placed or performed in visit on 05/01/24   UA Macroscopic with reflex to Microscopic and Culture     Status: Abnormal    Specimen: Urine, Clean Catch   Result Value Ref Range    Color Urine Yellow Colorless, Straw, Light Yellow, Yellow    Appearance Urine Clear Clear    Glucose Urine Negative Negative mg/dL    Bilirubin Urine Negative Negative    Ketones Urine Negative Negative mg/dL    Specific Gravity Urine 1.008 1.000 - 1.030    Blood Urine Trace (A) Negative    pH Urine 5.0 5.0 - 9.0    Protein Albumin Urine Negative Negative mg/dL    Urobilinogen Urine Normal Normal, 2.0 mg/dL    Nitrite Urine Negative Negative    Leukocyte Esterase Urine Moderate (A) Negative    Mucus Urine Present (A) None Seen /LPF    RBC Urine 1 <=2 /HPF    WBC Urine 1 <=5 /HPF    Narrative    Urine Culture ordered based on laboratory criteria

## 2024-05-03 LAB — BACTERIA UR CULT: NORMAL

## 2024-06-09 SDOH — HEALTH STABILITY: PHYSICAL HEALTH: ON AVERAGE, HOW MANY MINUTES DO YOU ENGAGE IN EXERCISE AT THIS LEVEL?: 30 MIN

## 2024-06-09 SDOH — HEALTH STABILITY: PHYSICAL HEALTH: ON AVERAGE, HOW MANY DAYS PER WEEK DO YOU ENGAGE IN MODERATE TO STRENUOUS EXERCISE (LIKE A BRISK WALK)?: 3 DAYS

## 2024-06-09 ASSESSMENT — SOCIAL DETERMINANTS OF HEALTH (SDOH): HOW OFTEN DO YOU GET TOGETHER WITH FRIENDS OR RELATIVES?: MORE THAN THREE TIMES A WEEK

## 2024-06-10 ENCOUNTER — OFFICE VISIT (OUTPATIENT)
Dept: FAMILY MEDICINE | Facility: OTHER | Age: 49
End: 2024-06-10
Attending: NURSE PRACTITIONER
Payer: COMMERCIAL

## 2024-06-10 ENCOUNTER — MYC MEDICAL ADVICE (OUTPATIENT)
Dept: FAMILY MEDICINE | Facility: OTHER | Age: 49
End: 2024-06-10

## 2024-06-10 VITALS
BODY MASS INDEX: 27.64 KG/M2 | DIASTOLIC BLOOD PRESSURE: 64 MMHG | SYSTOLIC BLOOD PRESSURE: 102 MMHG | RESPIRATION RATE: 16 BRPM | HEIGHT: 63 IN | HEART RATE: 75 BPM | OXYGEN SATURATION: 95 % | TEMPERATURE: 96.5 F | WEIGHT: 156 LBS

## 2024-06-10 DIAGNOSIS — Z12.31 ENCOUNTER FOR SCREENING MAMMOGRAM FOR BREAST CANCER: ICD-10-CM

## 2024-06-10 DIAGNOSIS — E03.9 HYPOTHYROIDISM, UNSPECIFIED TYPE: ICD-10-CM

## 2024-06-10 DIAGNOSIS — Z00.00 HEALTHCARE MAINTENANCE: ICD-10-CM

## 2024-06-10 DIAGNOSIS — R73.01 IMPAIRED FASTING GLUCOSE: ICD-10-CM

## 2024-06-10 DIAGNOSIS — E78.00 HYPERCHOLESTEREMIA: ICD-10-CM

## 2024-06-10 DIAGNOSIS — F17.200 NICOTINE DEPENDENCE, UNCOMPLICATED, UNSPECIFIED NICOTINE PRODUCT TYPE: ICD-10-CM

## 2024-06-10 DIAGNOSIS — Z00.00 ROUTINE GENERAL MEDICAL EXAMINATION AT A HEALTH CARE FACILITY: Primary | ICD-10-CM

## 2024-06-10 LAB
ALBUMIN SERPL BCG-MCNC: 4.4 G/DL (ref 3.5–5.2)
ALP SERPL-CCNC: 58 U/L (ref 40–150)
ALT SERPL W P-5'-P-CCNC: 19 U/L (ref 0–50)
ANION GAP SERPL CALCULATED.3IONS-SCNC: 10 MMOL/L (ref 7–15)
AST SERPL W P-5'-P-CCNC: 19 U/L (ref 0–45)
BASOPHILS # BLD AUTO: 0.1 10E3/UL (ref 0–0.2)
BASOPHILS NFR BLD AUTO: 1 %
BILIRUB SERPL-MCNC: 0.4 MG/DL
BUN SERPL-MCNC: 24.6 MG/DL (ref 6–20)
CALCIUM SERPL-MCNC: 10 MG/DL (ref 8.6–10)
CHLORIDE SERPL-SCNC: 104 MMOL/L (ref 98–107)
CHOLEST SERPL-MCNC: 182 MG/DL
CREAT SERPL-MCNC: 1 MG/DL (ref 0.51–0.95)
DEPRECATED HCO3 PLAS-SCNC: 29 MMOL/L (ref 22–29)
EGFRCR SERPLBLD CKD-EPI 2021: 69 ML/MIN/1.73M2
EOSINOPHIL # BLD AUTO: 0.6 10E3/UL (ref 0–0.7)
EOSINOPHIL NFR BLD AUTO: 9 %
ERYTHROCYTE [DISTWIDTH] IN BLOOD BY AUTOMATED COUNT: 11.9 % (ref 10–15)
FASTING STATUS PATIENT QL REPORTED: ABNORMAL
FASTING STATUS PATIENT QL REPORTED: ABNORMAL
GLUCOSE SERPL-MCNC: 109 MG/DL (ref 70–99)
HBA1C MFR BLD: 5.7 % (ref 4–6.2)
HCT VFR BLD AUTO: 45.8 % (ref 35–47)
HDLC SERPL-MCNC: 51 MG/DL
HGB BLD-MCNC: 15.6 G/DL (ref 11.7–15.7)
IMM GRANULOCYTES # BLD: 0 10E3/UL
IMM GRANULOCYTES NFR BLD: 0 %
LDLC SERPL CALC-MCNC: 106 MG/DL
LYMPHOCYTES # BLD AUTO: 2.3 10E3/UL (ref 0.8–5.3)
LYMPHOCYTES NFR BLD AUTO: 34 %
MCH RBC QN AUTO: 30.1 PG (ref 26.5–33)
MCHC RBC AUTO-ENTMCNC: 34.1 G/DL (ref 31.5–36.5)
MCV RBC AUTO: 88 FL (ref 78–100)
MONOCYTES # BLD AUTO: 0.7 10E3/UL (ref 0–1.3)
MONOCYTES NFR BLD AUTO: 10 %
NEUTROPHILS # BLD AUTO: 3.2 10E3/UL (ref 1.6–8.3)
NEUTROPHILS NFR BLD AUTO: 47 %
NONHDLC SERPL-MCNC: 131 MG/DL
NRBC # BLD AUTO: 0 10E3/UL
NRBC BLD AUTO-RTO: 0 /100
PLATELET # BLD AUTO: 270 10E3/UL (ref 150–450)
POTASSIUM SERPL-SCNC: 4 MMOL/L (ref 3.4–5.3)
PROT SERPL-MCNC: 7.6 G/DL (ref 6.4–8.3)
RBC # BLD AUTO: 5.18 10E6/UL (ref 3.8–5.2)
SODIUM SERPL-SCNC: 143 MMOL/L (ref 135–145)
T4 FREE SERPL-MCNC: 1.88 NG/DL (ref 0.9–1.7)
TRIGL SERPL-MCNC: 125 MG/DL
TSH SERPL DL<=0.005 MIU/L-ACNC: 0.23 UIU/ML (ref 0.3–4.2)
WBC # BLD AUTO: 6.8 10E3/UL (ref 4–11)

## 2024-06-10 PROCEDURE — 84443 ASSAY THYROID STIM HORMONE: CPT | Mod: ZL | Performed by: NURSE PRACTITIONER

## 2024-06-10 PROCEDURE — 99396 PREV VISIT EST AGE 40-64: CPT | Performed by: NURSE PRACTITIONER

## 2024-06-10 PROCEDURE — 85025 COMPLETE CBC W/AUTO DIFF WBC: CPT | Mod: ZL | Performed by: NURSE PRACTITIONER

## 2024-06-10 PROCEDURE — 83036 HEMOGLOBIN GLYCOSYLATED A1C: CPT | Mod: ZL | Performed by: NURSE PRACTITIONER

## 2024-06-10 PROCEDURE — 80053 COMPREHEN METABOLIC PANEL: CPT | Mod: ZL | Performed by: NURSE PRACTITIONER

## 2024-06-10 PROCEDURE — 84439 ASSAY OF FREE THYROXINE: CPT | Mod: ZL | Performed by: NURSE PRACTITIONER

## 2024-06-10 PROCEDURE — 80061 LIPID PANEL: CPT | Mod: ZL | Performed by: NURSE PRACTITIONER

## 2024-06-10 PROCEDURE — 36415 COLL VENOUS BLD VENIPUNCTURE: CPT | Mod: ZL | Performed by: NURSE PRACTITIONER

## 2024-06-10 RX ORDER — LEVOTHYROXINE SODIUM 112 UG/1
112 TABLET ORAL DAILY
Qty: 90 TABLET | Refills: 4 | Status: SHIPPED | OUTPATIENT
Start: 2024-06-10

## 2024-06-10 RX ORDER — PRAVASTATIN SODIUM 10 MG
10 TABLET ORAL DAILY
Qty: 90 TABLET | Refills: 4 | Status: SHIPPED | OUTPATIENT
Start: 2024-06-10 | End: 2024-08-28 | Stop reason: DRUGHIGH

## 2024-06-10 ASSESSMENT — PAIN SCALES - GENERAL: PAINLEVEL: NO PAIN (0)

## 2024-06-10 NOTE — TELEPHONE ENCOUNTER
Per comment from [facility] from 6/10/24:      Mallorie,     Based on your results from your thyroid levels, it would be recommended to be on the reduced dose at 112 mcg of synthroid as your tsh level is just below the normal reference range (putting you more so into the hyperthyroidism range). However, if you are feeling much better on the 125 I guess you can take it but would recommend re-check in 4-6 weeks.     Sincerely,  Maranda Ny NP on 6/10/2024 at 1:15 PM    Lotus Granger RN on 6/10/2024 at 2:21 PM

## 2024-06-10 NOTE — PATIENT INSTRUCTIONS
"Preventive Care Advice   This is general advice we often give to help people stay healthy. Your care team may have specific advice just for you. Please talk to your care team about your own preventive care needs.  Lifestyle  Exercise at least 150 minutes each week (30 minutes a day, 5 days a week).  Do muscle strengthening activities 2 days a week. These help control your weight and prevent disease.  No smoking.  Wear sunscreen to prevent skin cancer.  Have your home tested for radon every 2 to 5 years. Radon is a colorless, odorless gas that can harm your lungs. To learn more, go to www.health.[facility].mn. and search for \"Radon in Homes.\"  Keep guns unloaded and locked up in a safe place like a safe or gun vault, or, use a gun lock and hide the keys. Always lock away bullets separately. To learn more, visit Insights.mn.gov and search for \"safe gun storage.\"  Nutrition  Eat 5 or more servings of fruits and vegetables each day.  Try wheat bread, brown rice and whole grain pasta (instead of white bread, rice, and pasta).  Get enough calcium and vitamin D. Check the label on foods and aim for 100% of the RDA (recommended daily allowance).  Regular exams  Have a dental exam and cleaning every 6 months.  See your health care team every year to talk about:  Any changes in your health.  Any medicines your care team has prescribed.  Preventive care, family planning, and ways to prevent chronic diseases.  Shots (vaccines)   HPV shots (up to age 26), if you've never had them before.  Hepatitis B shots (up to age 59), if you've never had them before.  COVID-19 shot: Get this shot when it's due.  Flu shot: Get a flu shot every year.  Tetanus shot: Get a tetanus shot every 10 years.  Pneumococcal, hepatitis A, and RSV shots: Ask your care team if you need these based on your risk.  Shingles shot (for age 50 and up).  General health tests  Diabetes screening:  Starting at age 35, Get screened for diabetes at least every 3 years.  If " you are younger than age 35, ask your care team if you should be screened for diabetes.  Cholesterol test: At age 39, start having a cholesterol test every 5 years, or more often if advised.  Bone density scan (DEXA): At age 50, ask your care team if you should have this scan for osteoporosis (brittle bones).  Hepatitis C: Get tested at least once in your life.  Abdominal aortic aneurysm screening: Talk to your doctor about having this screening if you:  Have ever smoked; and  Are biologically male; and  Are between the ages of 65 and 75.  STIs (sexually transmitted infections)  Before age 24: Ask your care team if you should be screened for STIs.  After age 24: Get screened for STIs if you're at risk. You are at risk for STIs (including HIV) if:  You are sexually active with more than one person.  You don't use condoms every time.  You or a partner was diagnosed with a sexually transmitted infection.  If you are at risk for HIV, ask about PrEP medicine to prevent HIV.  Get tested for HIV at least once in your life, whether you are at risk for HIV or not.  Cancer screening tests  Cervical cancer screening: If you have a cervix, begin getting regular cervical cancer screening tests at age 21. Most people who have regular screenings with normal results can stop after age 65. Talk about this with your provider.  Breast cancer scan (mammogram): If you've ever had breasts, begin having regular mammograms starting at age 40. This is a scan to check for breast cancer.  Colon cancer screening: It is important to start screening for colon cancer at age 45.  Have a colonoscopy test every 10 years (or more often if you're at risk) Or, ask your provider about stool tests like a FIT test every year or Cologuard test every 3 years.  To learn more about your testing options, visit: www.[employer]/486228.pdf.  For help making a decision, visit: john/go49283.  Prostate cancer screening test: If you have a prostate and are age 55  to 69, ask your provider if you would benefit from a yearly prostate cancer screening test.  Lung cancer screening: If you are a current or former smoker age 50 to 80, ask your care team if ongoing lung cancer screenings are right for you.  For informational purposes only. Not to replace the advice of your health care provider. Copyright   2023 [facility]. All rights reserved. Clinically reviewed by the [facility]. Lodgeo [id number] - REV 04/24.    Quitting Tobacco: Care Instructions  Quitting tobacco is much harder than simply changing a habit. Nicotine cravings make it hard to quit, but you can do it. Your doctor will help you set up the plan that best meets your needs.    You will miss the nicotine at first. You may feel short-tempered and grumpy. You may have trouble sleeping or thinking clearly. The urge to use tobacco may continue for a time.    Combining tools can raise your chances of success. You can use medicine along with counseling. And you can join a quit-tobacco program, such as the American Lung Association's Freedom from Smoking program.    Get support.  Reach out to family and friends, and find a counselor to help you quit. Join a support group, such as Nicotine Anonymous. Go to www.smokefree.gov to sign up for text messaging support.     Talk to your doctor or pharmacist about medicines that can help you quit.  Medicines can help with cravings and withdrawal symptoms. There are several over-the-counter choices, such as nicotine patches, gum, and lozenges.     After you quit, do not use tobacco again, not even once.  Get rid of all tobacco products and anything that reminds you of tobacco, such as ashtrays.     Avoid things that make you reach for tobacco.  Change your daily routine. Take a different route to work, or eat a meal in a different place.     Try to cut down on stress.  Find ways to calm yourself, such as taking a hot bath or doing deep breathing  "exercises.     Eat a healthy diet, and get regular exercise.  Having healthy habits may help you quit using tobacco.     Don't give up on quitting if you use tobacco again.  Most people quit and restart a few times before they quit for good.   Follow-up care is a key part of your treatment and safety. Be sure to make and go to all appointments, and call your doctor if you are having problems. It's also a good idea to know your test results and keep a list of the medicines you take.  Where can you learn more?  Go to https://www."[employer].net/patiented  Enter Y522 in the search box to learn more about \"Quitting Tobacco: Care Instructions.\"  Current as of: November 15, 2023               Content Version: 14.0    7074-1677 [employer].   Care instructions adapted under license by your healthcare professional. If you have questions about a medical condition or this instruction, always ask your healthcare professional. Healthwise, [employer] disclaims any warranty or liability for your use of this information.      "

## 2024-06-10 NOTE — PROGRESS NOTES
Preventive Care Visit  [facility]  Maranda Ny NP, Family Medicine  Mendez 10, 2024      Assessment & Plan   Problem List Items Addressed This Visit       Hypothyroidism    Relevant Medications    levothyroxine (SYNTHROID/LEVOTHROID) 112 MCG tablet    Other Relevant Orders    TSH Reflex GH (Completed)    T4 free (Completed)    Impaired fasting glucose    Relevant Orders    Hemoglobin A1c (Completed)    Healthcare maintenance    Relevant Orders    Lipid Panel (Completed)    Comprehensive Metabolic Panel (Completed)    CBC and Differential (Completed)    TSH Reflex GH (Completed)    T4 free (Completed)     Other Visit Diagnoses       Routine general medical examination at a health care facility    -  Primary    Relevant Orders    Lipid Panel (Completed)    Comprehensive Metabolic Panel (Completed)    CBC and Differential (Completed)    TSH Reflex GH (Completed)    T4 free (Completed)    Encounter for screening mammogram for breast cancer        Relevant Orders    MA Screen Bilateral w/Lb    Hypercholesteremia        Relevant Medications    pravastatin (PRAVACHOL) 10 MG tablet    Other Relevant Orders    Lipid Panel (Completed)    Nicotine dependence, uncomplicated, unspecified nicotine product type        Relevant Orders    [place] Quit Partner Referral           1. Routine general medical examination at a health care facility  2. Healthcare maintenance  - Lipid Panel  - Comprehensive Metabolic Panel  - CBC and Differential  - TSH Reflex GH  - T4 free    3. Encounter for screening mammogram for breast cancer  Mammogram ordered.   - MA Screen Bilateral w/Lb; Future    4. Hypothyroidism, unspecified type  Would recommend 112 mcg dose of synthroid; tsh is low but stable at 0.23 with an increased free t4 at 1.88. refilled synthroid. She was not feeling well on 112 mcg dose so she has been taking 125 mcg dose that she had left over at home.   - TSH Reflex GH  - T4 free  - levothyroxine  (SYNTHROID/LEVOTHROID) 112 MCG tablet; Take 1 tablet (112 mcg) by mouth daily  Dispense: 90 tablet; Refill: 4    5. Hypercholesteremia  Stable cholesterol levels; improved since last checked 2 years ago. LDL mildly elevated at 106 and nonHDL cholesterol 131. Continue pravastatin at current dose.   - Lipid Panel  - pravastatin (PRAVACHOL) 10 MG tablet; Take 1 tablet (10 mg) by mouth daily  Dispense: 90 tablet; Refill: 4    6. Impaired fasting glucose  Normal hemoglobin A1C.   - Hemoglobin A1c    7. Nicotine dependence, uncomplicated, unspecified nicotine product type  - [place] Quit Partner Referral; Future  Has tried chantix, wellbutrin, patches, gum without success. She is looking into hypnosis for smoking cessation.       Patient has been advised of split billing requirements and indicates understanding: Yes       Nicotine/Tobacco Cessation  She reports that she has been smoking cigarettes. She has a 18 pack-year smoking history. She has been exposed to tobacco smoke. She has never used smokeless tobacco.  Nicotine/Tobacco Cessation Plan  Information offered: Patient not interested at this time  Phone counseling: Place order for Quit Partner Referral      Counseling  Appropriate preventive services were discussed with this patient, including applicable screening as appropriate for fall prevention, nutrition, physical activity, Tobacco-use cessation, weight loss and cognition.  Checklist reviewing preventive services available has been given to the patient.  Reviewed patient's diet, addressing concerns and/or questions.   She is at risk for lack of exercise and has been provided with information to increase physical activity for the benefit of her well-being.   The patient was instructed to see the dentist every 6 months.     Work on weight loss  Regular exercise  See Patient Instructions    Return in about 53 weeks (around 6/16/2025) for Annual Wellness Visit.      Fabiola Nobles is a 48 year old, presenting for  the following:  Physical (Not fasting )        6/10/2024     9:53 AM   Additional Questions   Roomed by Annemarie Lipscomb CMA(AAMA)         6/10/2024     9:53 AM   Patient Reported Additional Medications   Patient reports taking the following new medications nothing new, but went back to her Levothryoxine 125 mcg        Health Care Directive  Patient does not have a Health Care Directive or Living Will: Discussed advance care planning with patient; however, patient declined at this time.    Healthy Habits:     Bi-annual eye exam:  Yes    Dental care twice a year:  NO    Sleep apnea or symptoms of sleep apnea:  None    Diet:  Regular (no restrictions)    Frequency of exercise:  2-3 days/week    Duration of exercise:  30-45 minutes    Taking medications regularly:  Yes    Barriers to taking medications:  None    Additional concerns today:  No    Mallorie presents today for her annual physical and lab work.  She states that she is feeling well and does not have any new concerns at this time.  She has chronic hypothyroidism.  She tells me that her primary care provider had reduced her dose of Synthroid down to 112 mcg daily which she took for several months and did not feel well on.  She states when she was on this dose she felt cold all the time, fatigue, her hair was falling out and she felt tired.  Since being on 125 mcg dose that she resume taking about 1 month ago she says she is feeling much better.    She did eat today.  She is not fasting.    Right knee surgery in October; not improved. Still exercises, encourages herself to do so despite the pain. Went to PT, got a knee injection. Really no relief. At this time, she plans to ride it out now until potentially needing a replacement.     Colonoscopy- normal, recommended in 10 years. Had it done in 2023.     Pap done in 2022 - she still has a cervix but otherwise had a total hysterectomy. Paps every 5 years. Due in 2027.     She has tried multiple methods of smoking  cessation including Chantix, nicotine patches, gum, has not been successful with these.  She does want to quit due to cost especially but feels she will be unsuccessful as prior tried methods of smoking cessation have failed.  She is interested in considering hypnosis for smoking cessation and is looking into that.          6/9/2024   General Health   How would you rate your overall physical health? Good   Feel stress (tense, anxious, or unable to sleep) Not at all         6/9/2024   Nutrition   Three or more servings of calcium each day? Yes   Diet: Carbohydrate counting   How many servings of fruit and vegetables per day? (!) 0-1   How many sweetened beverages each day? 0-1         6/9/2024   Exercise   Days per week of moderate/strenous exercise 3 days   Average minutes spent exercising at this level 30 min         6/9/2024   Social Factors   Frequency of gathering with friends or relatives More than three times a week   Worry food won't last until get money to buy more No   Food not last or not have enough money for food? No   Do you have housing?  Yes   Are you worried about losing your housing? No   Lack of transportation? No   Unable to get utilities (heat,electricity)? No         6/9/2024   Dental   Dentist two times every year? (!) NO         6/9/2024   TB Screening   Were you born outside of the [place]? No         Today's PHQ-2 Score:       6/9/2024     7:18 PM   PHQ-2 ( 1999 [employer])   Q1: Little interest or pleasure in doing things 0   Q2: Feeling down, depressed or hopeless 0   PHQ-2 Score 0   Q1: Little interest or pleasure in doing things Not at all   Q2: Feeling down, depressed or hopeless Not at all   PHQ-2 Score 0           6/9/2024   Substance Use   If I could quit smoking, I would Completely agree   I want to quit somking, worry about health affects Somewhat agree   Willing to make a plan to quit smoking Completely agree   Willing to cut down before quitting Completely agree   Alcohol more than 3/day  or more than 7/wk No   Do you use any other substances recreationally? No     Social History     Tobacco Use    Smoking status: Every Day     Current packs/day: 0.75     Average packs/day: 0.8 packs/day for 24.0 years (18.0 ttl pk-yrs)     Types: Cigarettes     Passive exposure: Past    Smokeless tobacco: Never    Tobacco comments:     Passive exposure in childhood home.    Vaping Use    Vaping status: Never Used   Substance Use Topics    Alcohol use: Not Currently     Alcohol/week: 0.0 standard drinks of alcohol    Drug use: Never           6/19/2023   LAST FHS-7 RESULTS   1st degree relative breast or ovarian cancer No   Any relative bilateral breast cancer No   Any male have breast cancer No   Any ONE woman have BOTH breast AND ovarian cancer No   Any woman with breast cancer before 50yrs No   2 or more relatives with breast AND/OR ovarian cancer No   2 or more relatives with breast AND/OR bowel cancer No        Mammogram Screening - Mammogram every 1-2 years updated in Health Maintenance based on mutual decision making        6/9/2024   STI Screening   New sexual partner(s) since last STI/HIV test? No     History of abnormal Pap smear: No - age 30-64 HPV with reflex Pap every 5 years recommended -    Pap done in 2022 - she still has a cervix but otherwise had a total hysterectomy. Paps every 5 years. Due in 2027.         Latest Ref Rng & Units 5/4/2022    11:41 AM   PAP / HPV   PAP  Negative for Intraepithelial Lesion or Malignancy (NILM)    HPV 16 DNA Negative Negative    HPV 18 DNA Negative Negative    Other HR HPV Negative Negative      ASCVD Risk   The 10-year ASCVD risk score (Samantha CHONG, et al., 2019) is: 2%    Values used to calculate the score:      Age: 48 years      Sex: Female      Is Non- : No      Diabetic: No      Tobacco smoker: Yes      Systolic Blood Pressure: 102 mmHg      Is BP treated: No      HDL Cholesterol: 51 mg/dL      Total Cholesterol: 182 mg/dL      "  Reviewed and updated as needed this visit by Provider                    Review of Systems  Constitutional, HEENT, cardiovascular, pulmonary, GI, , musculoskeletal, neuro, skin, endocrine and psych systems are negative, except as otherwise noted.     Objective    Exam  /64 (BP Location: Right arm, Patient Position: Sitting, Cuff Size: Adult Regular)   Pulse 75   Temp (!) 96.5  F (35.8  C) (Tympanic)   Resp 16   Ht 1.595 m (5' 2.8\")   Wt 70.8 kg (156 lb)   LMP 10/01/2004 (Approximate)   SpO2 95%   BMI 27.81 kg/m     Estimated body mass index is 27.81 kg/m  as calculated from the following:    Height as of this encounter: 1.595 m (5' 2.8\").    Weight as of this encounter: 70.8 kg (156 lb).    Physical Exam  Vitals and nursing note reviewed.   Constitutional:       General: She is not in acute distress.     Appearance: Normal appearance. She is not ill-appearing, toxic-appearing or diaphoretic.   HENT:      Head: Normocephalic and atraumatic.      Right Ear: Tympanic membrane, ear canal and external ear normal. There is no impacted cerumen.      Left Ear: Tympanic membrane, ear canal and external ear normal. There is no impacted cerumen.      Nose: Nose normal. No congestion or rhinorrhea.      Mouth/Throat:      Mouth: Mucous membranes are moist.      Pharynx: No oropharyngeal exudate or posterior oropharyngeal erythema.   Eyes:      Extraocular Movements: Extraocular movements intact.      Pupils: Pupils are equal, round, and reactive to light.   Neck:      Vascular: No carotid bruit.   Cardiovascular:      Rate and Rhythm: Normal rate and regular rhythm.      Heart sounds: Normal heart sounds. No murmur heard.  Pulmonary:      Effort: Pulmonary effort is normal. No respiratory distress.      Breath sounds: Normal breath sounds. No stridor. No wheezing, rhonchi or rales.   Chest:      Chest wall: No tenderness.   Abdominal:      General: Abdomen is flat. There is no distension.      Palpations: " Abdomen is soft. There is no mass.      Tenderness: There is no abdominal tenderness. There is no right CVA tenderness, left CVA tenderness, guarding or rebound.      Hernia: No hernia is present.   Genitourinary:     Comments: deferred  Musculoskeletal:         General: Normal range of motion.      Cervical back: Normal range of motion and neck supple.      Right lower leg: No edema.      Left lower leg: No edema.   Lymphadenopathy:      Cervical: No cervical adenopathy.   Skin:     General: Skin is warm and dry.      Capillary Refill: Capillary refill takes less than 2 seconds.      Coloration: Skin is not jaundiced.      Findings: No rash.   Neurological:      General: No focal deficit present.      Mental Status: She is alert and oriented to person, place, and time.   Psychiatric:         Mood and Affect: Mood normal.         Behavior: Behavior normal.         Signed Electronically by: Maranda Ny NP

## 2024-07-15 ENCOUNTER — HOSPITAL ENCOUNTER (OUTPATIENT)
Dept: MAMMOGRAPHY | Facility: OTHER | Age: 49
Discharge: HOME | End: 2024-07-15
Attending: NURSE PRACTITIONER | Admitting: NURSE PRACTITIONER
Payer: COMMERCIAL

## 2024-07-15 DIAGNOSIS — Z12.31 ENCOUNTER FOR SCREENING MAMMOGRAM FOR BREAST CANCER: ICD-10-CM

## 2024-07-15 PROCEDURE — 77063 BREAST TOMOSYNTHESIS BI: CPT

## 2024-08-13 ENCOUNTER — HOSPITAL ENCOUNTER (EMERGENCY)
Facility: OTHER | Age: 49
Discharge: ANOTHER HOSPITAL | End: 2024-08-13
Payer: COMMERCIAL

## 2024-08-13 ENCOUNTER — APPOINTMENT (OUTPATIENT)
Dept: CT IMAGING | Facility: OTHER | Age: 49
End: 2024-08-13
Payer: COMMERCIAL

## 2024-08-13 ENCOUNTER — APPOINTMENT (OUTPATIENT)
Dept: GENERAL RADIOLOGY | Facility: OTHER | Age: 49
End: 2024-08-13
Payer: COMMERCIAL

## 2024-08-13 VITALS
BODY MASS INDEX: 28.71 KG/M2 | TEMPERATURE: 98.2 F | OXYGEN SATURATION: 93 % | HEIGHT: 62 IN | SYSTOLIC BLOOD PRESSURE: 108 MMHG | WEIGHT: 156 LBS | RESPIRATION RATE: 16 BRPM | DIASTOLIC BLOOD PRESSURE: 63 MMHG | HEART RATE: 71 BPM

## 2024-08-13 DIAGNOSIS — I20.0 UNSTABLE ANGINA (H): ICD-10-CM

## 2024-08-13 LAB
ALBUMIN SERPL BCG-MCNC: 4.5 G/DL (ref 3.5–5.2)
ALBUMIN UR-MCNC: NEGATIVE MG/DL
ALP SERPL-CCNC: 72 U/L (ref 40–150)
ALT SERPL W P-5'-P-CCNC: 30 U/L (ref 0–50)
ANION GAP SERPL CALCULATED.3IONS-SCNC: 12 MMOL/L (ref 7–15)
APPEARANCE UR: CLEAR
AST SERPL W P-5'-P-CCNC: 33 U/L (ref 0–45)
ATRIAL RATE - MUSE: 68 BPM
BACTERIA #/AREA URNS HPF: ABNORMAL /HPF
BASOPHILS # BLD AUTO: 0.1 10E3/UL (ref 0–0.2)
BASOPHILS NFR BLD AUTO: 1 %
BILIRUB SERPL-MCNC: 0.4 MG/DL
BILIRUB UR QL STRIP: NEGATIVE
BUN SERPL-MCNC: 16.3 MG/DL (ref 6–20)
CALCIUM SERPL-MCNC: 9.5 MG/DL (ref 8.8–10.4)
CHLORIDE SERPL-SCNC: 102 MMOL/L (ref 98–107)
COLOR UR AUTO: YELLOW
CREAT SERPL-MCNC: 0.71 MG/DL (ref 0.51–0.95)
DIASTOLIC BLOOD PRESSURE - MUSE: NORMAL MMHG
EGFRCR SERPLBLD CKD-EPI 2021: >90 ML/MIN/1.73M2
EOSINOPHIL # BLD AUTO: 0.9 10E3/UL (ref 0–0.7)
EOSINOPHIL NFR BLD AUTO: 11 %
ERYTHROCYTE [DISTWIDTH] IN BLOOD BY AUTOMATED COUNT: 12 % (ref 10–15)
ERYTHROCYTE [DISTWIDTH] IN BLOOD BY AUTOMATED COUNT: 12 % (ref 10–15)
GLUCOSE SERPL-MCNC: 107 MG/DL (ref 70–99)
GLUCOSE UR STRIP-MCNC: NEGATIVE MG/DL
HCO3 SERPL-SCNC: 26 MMOL/L (ref 22–29)
HCT VFR BLD AUTO: 49.4 % (ref 35–47)
HCT VFR BLD AUTO: 49.4 % (ref 35–47)
HGB BLD-MCNC: 16.9 G/DL (ref 11.7–15.7)
HGB BLD-MCNC: 16.9 G/DL (ref 11.7–15.7)
HGB UR QL STRIP: NEGATIVE
HOLD SPECIMEN: NORMAL
HOLD SPECIMEN: NORMAL
IMM GRANULOCYTES # BLD: 0 10E3/UL
IMM GRANULOCYTES NFR BLD: 0 %
INTERPRETATION ECG - MUSE: NORMAL
KETONES UR STRIP-MCNC: NEGATIVE MG/DL
LEUKOCYTE ESTERASE UR QL STRIP: NEGATIVE
LIPASE SERPL-CCNC: 29 U/L (ref 13–60)
LYMPHOCYTES # BLD AUTO: 2.8 10E3/UL (ref 0.8–5.3)
LYMPHOCYTES NFR BLD AUTO: 36 %
MCH RBC QN AUTO: 30.6 PG (ref 26.5–33)
MCH RBC QN AUTO: 30.6 PG (ref 26.5–33)
MCHC RBC AUTO-ENTMCNC: 34.2 G/DL (ref 31.5–36.5)
MCHC RBC AUTO-ENTMCNC: 34.2 G/DL (ref 31.5–36.5)
MCV RBC AUTO: 90 FL (ref 78–100)
MCV RBC AUTO: 90 FL (ref 78–100)
MONOCYTES # BLD AUTO: 0.6 10E3/UL (ref 0–1.3)
MONOCYTES NFR BLD AUTO: 8 %
NEUTROPHILS # BLD AUTO: 3.4 10E3/UL (ref 1.6–8.3)
NEUTROPHILS NFR BLD AUTO: 44 %
NITRATE UR QL: NEGATIVE
NRBC # BLD AUTO: 0 10E3/UL
NRBC BLD AUTO-RTO: 0 /100
NT-PROBNP SERPL-MCNC: 40 PG/ML (ref 0–450)
P AXIS - MUSE: 36 DEGREES
PH UR STRIP: 6.5 [PH] (ref 5–9)
PLATELET # BLD AUTO: 284 10E3/UL (ref 150–450)
PLATELET # BLD AUTO: 284 10E3/UL (ref 150–450)
POTASSIUM SERPL-SCNC: 3.8 MMOL/L (ref 3.4–5.3)
PR INTERVAL - MUSE: 136 MS
PROT SERPL-MCNC: 7.8 G/DL (ref 6.4–8.3)
QRS DURATION - MUSE: 88 MS
QT - MUSE: 448 MS
QTC - MUSE: 476 MS
R AXIS - MUSE: -24 DEGREES
RBC # BLD AUTO: 5.52 10E6/UL (ref 3.8–5.2)
RBC # BLD AUTO: 5.52 10E6/UL (ref 3.8–5.2)
RBC URINE: <1 /HPF
SODIUM SERPL-SCNC: 140 MMOL/L (ref 135–145)
SP GR UR STRIP: 1.01 (ref 1–1.03)
SYSTOLIC BLOOD PRESSURE - MUSE: NORMAL MMHG
T AXIS - MUSE: 25 DEGREES
TROPONIN T SERPL HS-MCNC: <6 NG/L
TROPONIN T SERPL HS-MCNC: <6 NG/L
UROBILINOGEN UR STRIP-MCNC: NORMAL MG/DL
VENTRICULAR RATE- MUSE: 68 BPM
WBC # BLD AUTO: 7.7 10E3/UL (ref 4–11)
WBC # BLD AUTO: 7.7 10E3/UL (ref 4–11)
WBC URINE: 1 /HPF

## 2024-08-13 PROCEDURE — 250N000011 HC RX IP 250 OP 636

## 2024-08-13 PROCEDURE — 83880 ASSAY OF NATRIURETIC PEPTIDE: CPT

## 2024-08-13 PROCEDURE — 70450 CT HEAD/BRAIN W/O DYE: CPT

## 2024-08-13 PROCEDURE — 36415 COLL VENOUS BLD VENIPUNCTURE: CPT

## 2024-08-13 PROCEDURE — 96368 THER/DIAG CONCURRENT INF: CPT

## 2024-08-13 PROCEDURE — 85025 COMPLETE CBC W/AUTO DIFF WBC: CPT

## 2024-08-13 PROCEDURE — 250N000013 HC RX MED GY IP 250 OP 250 PS 637

## 2024-08-13 PROCEDURE — 96366 THER/PROPH/DIAG IV INF ADDON: CPT

## 2024-08-13 PROCEDURE — 99291 CRITICAL CARE FIRST HOUR: CPT | Mod: 25

## 2024-08-13 PROCEDURE — 93010 ELECTROCARDIOGRAM REPORT: CPT | Performed by: INTERNAL MEDICINE

## 2024-08-13 PROCEDURE — 71045 X-RAY EXAM CHEST 1 VIEW: CPT

## 2024-08-13 PROCEDURE — 84484 ASSAY OF TROPONIN QUANT: CPT | Mod: 91

## 2024-08-13 PROCEDURE — 99285 EMERGENCY DEPT VISIT HI MDM: CPT

## 2024-08-13 PROCEDURE — 250N000009 HC RX 250

## 2024-08-13 PROCEDURE — 71275 CT ANGIOGRAPHY CHEST: CPT

## 2024-08-13 PROCEDURE — 81003 URINALYSIS AUTO W/O SCOPE: CPT

## 2024-08-13 PROCEDURE — 83690 ASSAY OF LIPASE: CPT

## 2024-08-13 PROCEDURE — 99292 CRITICAL CARE ADDL 30 MIN: CPT

## 2024-08-13 PROCEDURE — 93005 ELECTROCARDIOGRAM TRACING: CPT | Mod: RTG

## 2024-08-13 PROCEDURE — 96365 THER/PROPH/DIAG IV INF INIT: CPT | Mod: XU

## 2024-08-13 PROCEDURE — 82040 ASSAY OF SERUM ALBUMIN: CPT

## 2024-08-13 RX ORDER — NITROGLYCERIN 0.4 MG/1
0.4 TABLET SUBLINGUAL EVERY 5 MIN PRN
Status: DISCONTINUED | OUTPATIENT
Start: 2024-08-13 | End: 2024-08-14 | Stop reason: HOSPADM

## 2024-08-13 RX ORDER — HEPARIN SODIUM 10000 [USP'U]/100ML
0-5000 INJECTION, SOLUTION INTRAVENOUS CONTINUOUS
Status: DISCONTINUED | OUTPATIENT
Start: 2024-08-13 | End: 2024-08-14 | Stop reason: HOSPADM

## 2024-08-13 RX ORDER — NITROGLYCERIN 20 MG/100ML
10-200 INJECTION INTRAVENOUS CONTINUOUS
Status: DISCONTINUED | OUTPATIENT
Start: 2024-08-13 | End: 2024-08-14 | Stop reason: HOSPADM

## 2024-08-13 RX ORDER — IOPAMIDOL 755 MG/ML
100 INJECTION, SOLUTION INTRAVASCULAR ONCE
Status: COMPLETED | OUTPATIENT
Start: 2024-08-13 | End: 2024-08-13

## 2024-08-13 RX ORDER — ASPIRIN 81 MG/1
324 TABLET, CHEWABLE ORAL ONCE
Status: COMPLETED | OUTPATIENT
Start: 2024-08-13 | End: 2024-08-13

## 2024-08-13 RX ADMIN — NITROGLYCERIN 0.4 MG: 0.4 TABLET SUBLINGUAL at 15:59

## 2024-08-13 RX ADMIN — IOPAMIDOL 100 ML: 755 INJECTION, SOLUTION INTRAVENOUS at 15:08

## 2024-08-13 RX ADMIN — ASPIRIN 81 MG CHEWABLE TABLET 324 MG: 81 TABLET CHEWABLE at 16:31

## 2024-08-13 RX ADMIN — NITROGLYCERIN 10 MCG/MIN: 20 INJECTION INTRAVENOUS at 17:40

## 2024-08-13 RX ADMIN — SODIUM CHLORIDE 90 ML: 9 INJECTION, SOLUTION INTRAVENOUS at 15:08

## 2024-08-13 RX ADMIN — HEPARIN SODIUM 850 UNITS/HR: 10000 INJECTION, SOLUTION INTRAVENOUS at 17:39

## 2024-08-13 ASSESSMENT — ENCOUNTER SYMPTOMS
BACK PAIN: 1
VOMITING: 1
SHORTNESS OF BREATH: 1
HEADACHES: 1

## 2024-08-13 ASSESSMENT — COLUMBIA-SUICIDE SEVERITY RATING SCALE - C-SSRS
2. HAVE YOU ACTUALLY HAD ANY THOUGHTS OF KILLING YOURSELF IN THE PAST MONTH?: NO
1. IN THE PAST MONTH, HAVE YOU WISHED YOU WERE DEAD OR WISHED YOU COULD GO TO SLEEP AND NOT WAKE UP?: NO
6. HAVE YOU EVER DONE ANYTHING, STARTED TO DO ANYTHING, OR PREPARED TO DO ANYTHING TO END YOUR LIFE?: NO

## 2024-08-13 ASSESSMENT — ACTIVITIES OF DAILY LIVING (ADL)
ADLS_ACUITY_SCORE: 35

## 2024-08-13 NOTE — ED PROVIDER NOTES
"  History     Chief Complaint   Patient presents with    Hypertension    Chest Pain     The history is provided by the patient, medical records and the spouse.     Mallorie Machado is a 48 year old female who presents to the emergency department today complaining of left-sided chest pain and headache.  Patient reports that this morning she woke up around 9 AM with a headache that has been coming and going with ringing in her ears.  She does not typically get headaches and this she describes as coming and going in severity, unrelieved by taking Excedrin.  When the headache did not go away she checked her blood pressure and noticed that it was 166/90 175/99 and 197/109.  Patient reports that her blood pressure is normally pretty good at home, she does not take medication for this.  She also has been experiencing intermittent moderate chest pain that she reports is \"annoying\" that wraps around to the left side of her back, down her left arm and tingling in her hand. Pain in left jaw area. Comes and goes. Symptoms at rest.  She has been feeling short of breath.  She has had nausea on and off since yesterday without vomiting.  No sweating.  Patient smokes almost a pack of cigarettes a day.  Reports that she has been told she has had a murmur, has had an echo in the past in which they told her that she had some calcium blockages on her heart at that time.  She is not dizzy or lightheaded, no vision changes numbness or tingling.  No other focal neurological deficits.    Denies pregnancy (total hysterectomy 2004) drug use; couple of alcoholic drinks last night but doesn't drink daily.    10/2023: Echocardiogram:Interpretation Summary  Global and regional left ventricular function is normal with an EF of 55-60%.  Mild concentric wall thickening consistent with left ventricular hypertrophy  is present.  No significant valvular abnormalities present.  Global right ventricular function is normal.  IVC diameter <2.1 cm collapsing " >50% with sniff suggests a normal RA pressure  of 3 mmHg.  No pericardial effusion is present.    Allergies:  Allergies   Allergen Reactions    Propoxyphene N-Apap Anaphylaxis     Other reaction(s): Throat Swelling/Closing    Amoxil [Amoxicillin] Other (See Comments), Nausea and Vomiting and GI Disturbance     Lethargic    Atorvastatin Muscle Pain (Myalgia)     Myalgias    Hydrocodone-Acetaminophen Itching and Nausea    Meperidine Itching     No hives    Morphine      Other reaction(s): Erythema  Face & hands    Morphine And Codeine Itching and Other (See Comments)     Face & hands      Other reaction(s): Erythema Face & hands    Oxycodone-Acetaminophen Itching       Problem List:    Patient Active Problem List    Diagnosis Date Noted    Healthcare maintenance 08/22/2023     Priority: Medium    Allergic rhinitis due to pollen 01/30/2018     Priority: Medium    Contact dermatitis and eczema 01/30/2018     Priority: Medium     Overview:   recurrent      Dysmenorrhea 01/30/2018     Priority: Medium     Overview:   pelvic pain      Hypothyroidism 01/30/2018     Priority: Medium    Pain in joint, forearm 01/30/2018     Priority: Medium     Overview:   chronic      Impaired fasting glucose 09/13/2013     Priority: Medium    Trigeminal neuritis 04/20/2013     Priority: Medium    Herniated cervical disc 01/13/2012     Priority: Medium    Endometriosis 10/29/2010     Priority: Medium    Degeneration of cervical intervertebral disc 07/27/2010     Priority: Medium        Past Medical History:    Past Medical History:   Diagnosis Date    Allergic rhinitis due to pollen     Cyst of ovary     Dermatitis     Dysmenorrhea     Dysthymic disorder     Endometriosis     Hypothyroidism     Injury of lower back     Low back pain     Obesity     Other cervical disc degeneration, unspecified cervical region     Other cervical disc displacement, unspecified cervical region     Personal history of other diseases of the female genital tract      Uncomplicated asthma        Past Surgical History:    Past Surgical History:   Procedure Laterality Date    ARTHROSCOPY KNEE Right 10/9/2023    Procedure: Arthroscopy knee, PARTIAL SYNOVECTOMY;  Surgeon: Daniel Vázquez MD;  Location: [facility] OR    ATTEMPTED ARTHROSCOPY      06,left wrist arthroscopy with debridement - Dr. Mahmood, [facility]    COLONOSCOPY  2023    F/U  normal    COLONOSCOPY N/A 2023    Procedure: Colonoscopy;  Surgeon: Dorian Schrader MD;  Location: [facility] OR    FUSION CERVICAL ANTERIOR ONE LEVEL      ,C5-6    HYSTERECTOMY TOTAL ABDOMINAL, BILATERAL SALPINGO-OOPHORECTOMY, COMBINED      ,bilateral salpingo-oophorectomy    LAPAROSCOPY DIAGNOSTIC (GENERAL)      ,    OTHER SURGICAL HISTORY       x 2,[id number],VAGINAL DELIVERY    OTHER SURGICAL HISTORY      2012,2054,REMOVAL OF FOREIGN BODY,ACDF C4-5 and C6-7 with hardware removal [Other][[[       Family History:    Family History   Problem Relation Age of Onset    Diabetes Mother         Diabetes,Type II    Other - See Comments Mother         hypothyroidism    Alzheimer Disease Mother 56         in 2024    Diabetes Sister         Diabetes,DM-I,  of splenic injury/infarct       Social History:  Marital Status:   [2]  Social History     Tobacco Use    Smoking status: Every Day     Current packs/day: 0.75     Average packs/day: 0.8 packs/day for 24.0 years (18.0 ttl pk-yrs)     Types: Cigarettes     Passive exposure: Past    Smokeless tobacco: Never    Tobacco comments:     Passive exposure in childhood home.    Vaping Use    Vaping status: Never Used   Substance Use Topics    Alcohol use: Not Currently     Alcohol/week: 0.0 standard drinks of alcohol    Drug use: Never        Medications:    celecoxib (CELEBREX) 100 MG capsule  Cetirizine HCl (ZYRTEC ALLERGY PO)  levothyroxine (SYNTHROID/LEVOTHROID) 112 MCG tablet  montelukast (SINGULAIR) 10 MG tablet  pravastatin (PRAVACHOL) 10 MG  "tablet          Review of Systems   HENT:  Positive for tinnitus.    Respiratory:  Positive for shortness of breath.    Cardiovascular:  Positive for chest pain.   Gastrointestinal:  Positive for vomiting.   Musculoskeletal:  Positive for back pain.   Neurological:  Positive for headaches.   All other systems reviewed and are negative.  See HPI    Physical Exam   BP: (!) 185/109  Pulse: 72  Temp: 98.2  F (36.8  C)  Resp: 20  Height: 157.5 cm (5' 2\")  Weight: 70.8 kg (156 lb)  SpO2: 98 %      Physical Exam  Vitals and nursing note reviewed.   Constitutional:       General: She is not in acute distress.     Appearance: She is well-developed. She is not ill-appearing or toxic-appearing.   HENT:      Head: Normocephalic.   Eyes:      Pupils: Pupils are equal, round, and reactive to light.   Cardiovascular:      Rate and Rhythm: Normal rate and regular rhythm.   Pulmonary:      Effort: Pulmonary effort is normal.      Breath sounds: Normal breath sounds.   Abdominal:      Palpations: Abdomen is soft.   Musculoskeletal:         General: Normal range of motion.      Cervical back: Normal range of motion and neck supple.   Skin:     General: Skin is warm.      Capillary Refill: Capillary refill takes less than 2 seconds.   Neurological:      General: No focal deficit present.      Mental Status: She is alert and oriented to person, place, and time.         ED Course            EKG Interpretation:      Interpreted by [facility] [place] CNP  Time reviewed: 1351  Symptoms at time of EKG: left sided chest pain   Rhythm: normal sinus   Rate: normal-68  Ectopy: none  Conduction: normal  ST Segments/ T Waves:  no STEMI; No ST-T wave changes  Comparison to prior: similar to 2021  Clinical Impression: as above  Pending  EKG over read by internal medicine         Results for orders placed or performed during the hospital encounter of 08/13/24 (from the past 24 hour(s))   EKG 12-lead, tracing only   Result Value Ref Range    Systolic " Blood Pressure  mmHg    Diastolic Blood Pressure  mmHg    Ventricular Rate 68 BPM    Atrial Rate 68 BPM    MN Interval 136 ms    QRS Duration 88 ms     ms    QTc 476 ms    P Axis 36 degrees    R AXIS -24 degrees    T Axis 25 degrees    Interpretation ECG       Sinus rhythm  Normal ECG  When compared with ECG of 06-Dec-2021 11:33,  No significant change was found  Confirmed by MD RAM DARIN ([id number]) on 8/13/2024 7:21:19 PM     CBC with platelets differential    Narrative    The following orders were created for panel order CBC with platelets differential.  Procedure                               Abnormality         Status                     ---------                               -----------         ------                     CBC with platelets and d...[[id number]]  Abnormal            Final result                 Please view results for these tests on the individual orders.   Comprehensive metabolic panel   Result Value Ref Range    Sodium 140 135 - 145 mmol/L    Potassium 3.8 3.4 - 5.3 mmol/L    Carbon Dioxide (CO2) 26 22 - 29 mmol/L    Anion Gap 12 7 - 15 mmol/L    Urea Nitrogen 16.3 6.0 - 20.0 mg/dL    Creatinine 0.71 0.51 - 0.95 mg/dL    GFR Estimate >90 >60 mL/min/1.73m2    Calcium 9.5 8.8 - 10.4 mg/dL    Chloride 102 98 - 107 mmol/L    Glucose 107 (H) 70 - 99 mg/dL    Alkaline Phosphatase 72 40 - 150 U/L    AST 33 0 - 45 U/L    ALT 30 0 - 50 U/L    Protein Total 7.8 6.4 - 8.3 g/dL    Albumin 4.5 3.5 - 5.2 g/dL    Bilirubin Total 0.4 <=1.2 mg/dL   Troponin T, High Sensitivity   Result Value Ref Range    Troponin T, High Sensitivity <6 <=14 ng/L   Nt probnp inpatient (BNP)   Result Value Ref Range    N terminal Pro BNP Inpatient 40 0 - 450 pg/mL   CBC with platelets and differential   Result Value Ref Range    WBC Count 7.7 4.0 - 11.0 10e3/uL    RBC Count 5.52 (H) 3.80 - 5.20 10e6/uL    Hemoglobin 16.9 (H) 11.7 - 15.7 g/dL    Hematocrit 49.4 (H) 35.0 - 47.0 %    MCV 90 78 - 100 fL    MCH 30.6 26.5 -  33.0 pg    MCHC 34.2 31.5 - 36.5 g/dL    RDW 12.0 10.0 - 15.0 %    Platelet Count 284 150 - 450 10e3/uL    % Neutrophils 44 %    % Lymphocytes 36 %    % Monocytes 8 %    % Eosinophils 11 %    % Basophils 1 %    % Immature Granulocytes 0 %    NRBCs per 100 WBC 0 <1 /100    Absolute Neutrophils 3.4 1.6 - 8.3 10e3/uL    Absolute Lymphocytes 2.8 0.8 - 5.3 10e3/uL    Absolute Monocytes 0.6 0.0 - 1.3 10e3/uL    Absolute Eosinophils 0.9 (H) 0.0 - 0.7 10e3/uL    Absolute Basophils 0.1 0.0 - 0.2 10e3/uL    Absolute Immature Granulocytes 0.0 <=0.4 10e3/uL    Absolute NRBCs 0.0 10e3/uL   Lipase   Result Value Ref Range    Lipase 29 13 - 60 U/L   CBC with platelets   Result Value Ref Range    WBC Count 7.7 4.0 - 11.0 10e3/uL    RBC Count 5.52 (H) 3.80 - 5.20 10e6/uL    Hemoglobin 16.9 (H) 11.7 - 15.7 g/dL    Hematocrit 49.4 (H) 35.0 - 47.0 %    MCV 90 78 - 100 fL    MCH 30.6 26.5 - 33.0 pg    MCHC 34.2 31.5 - 36.5 g/dL    RDW 12.0 10.0 - 15.0 %    Platelet Count 284 150 - 450 10e3/uL   Extra Tube    Narrative    The following orders were created for panel order Extra Tube.  Procedure                               Abnormality         Status                     ---------                               -----------         ------                     Extra Blue Top Tube[[id number]]                              Final result               Extra Red Top Tube[[id number]]                               Final result                 Please view results for these tests on the individual orders.   Extra Blue Top Tube   Result Value Ref Range    Hold Specimen jic    Extra Red Top Tube   Result Value Ref Range    Hold Specimen JIC    XR Chest Port 1 View    Narrative    Procedure:XR CHEST PORT 1 VIEW    Clinical history:Female, 48 years, chest pain, SOB    Technique: Single view was obtained.    Comparison: 8/30/2016    Findings: The cardiac silhouette is within normal limits.. The  pulmonary vasculature is within normal limits.    The lungs are  clear. Bony structures are unremarkable.  No  pneumothorax.      Impression    Impression:   No acute abnormality. No evidence of acute or active cardiopulmonary  disease.    AMANDA SANTANA MD         SYSTEM ID:  [id number]   CT Head w/o Contrast    Narrative    PROCEDURE: CT HEAD W/O CONTRAST   8/13/2024 3:10 PM    HISTORY:Female, age,  48 years, , , new onset headache, hypertension-;  Headache; r/o Subarachnoid hemorrhage; Sudden severe headache; Age >=  40 years; Symptom onset < 6 hours or unknown    COMPARISON:No relevant prior imaging.    TECHNIQUE: CT of the brain without contrast. Axial; sagittal and  coronal reconstructed images were reviewed.  This facility minimizes radiation dose by adjusting the mA and/or kV  according to each patient size.  This CT scan was performed using one or more the following dose  reduction techniques:  -Automated exposure control,  -Adjustment of the mA and/or kV according to patient's size, and/or,  -Use of iterative reconstruction technique.      FINDINGS: Ventricles and sulci are normal in size and shape. Gray and  white matter demonstrate normal differentiation.    Scattered mucosal thickening of the ethmoid air cells.. No acute  fracture.         Impression    IMPRESSION:   No acute intracranial abnormality.     Sinonasal disease with mucosal thickening of the ethmoid air cells.             AMANDA SANTANA MD         SYSTEM ID:  [id number]   CTA Chest with Contrast    Narrative    CTA CHEST WITH CONTRAST  8/13/2024 3:13 PM    CLINICAL HISTORY: Female, age 48 years,  left sided chest pain, back  pain, hypertension;    Comparison:  No relevant prior imaging.    TECHNIQUE:  CT angiogram was performed of the chest with IV contrast.  Axial, sagittal and coronal images were reviewed. 3-D MIP images were  used to optimize evaluation of the vessels and lung nodule  conspicuity.   This facility minimizes radiation dose by adjusting the mA and/or kV  according to each patient  size.  This CT scan was performed using one or more the following dose  reduction techniques:  -Automated exposure control,  -Adjustment of the mA and/or kV according to patient's size, and/or,  -Use of iterative reconstruction technique.      FINDINGS:   Good quality CTA demonstrates no evidence of pulmonary embolus.  Thoracic aorta is normal in contour and demonstrates small volume of  calcified plaque. The heart and great vessels demonstrate no acute  abnormality.     The lungs demonstrates a mild emphysema and dependent atelectasis  without evidence of well-defined consolidation. Thyroid gland is  hypoplastic. Postoperative changes seen in the lower cervical spine.  Esophagus and visualized portions of the upper abdomen are  unremarkable. Bony structures demonstrate no acute abnormality.        Impression    IMPRESSION:   No acute abnormality.    AMANDA SANTANA MD         SYSTEM ID:  [id number]   UA with Microscopic reflex to Culture    Specimen: Urine, Clean Catch   Result Value Ref Range    Color Urine Yellow Colorless, Straw, Light Yellow, Yellow    Appearance Urine Clear Clear    Glucose Urine Negative Negative mg/dL    Bilirubin Urine Negative Negative    Ketones Urine Negative Negative mg/dL    Specific Gravity Urine 1.014 1.000 - 1.030    Blood Urine Negative Negative    pH Urine 6.5 5.0 - 9.0    Protein Albumin Urine Negative Negative mg/dL    Urobilinogen Urine Normal Normal, 2.0 mg/dL    Nitrite Urine Negative Negative    Leukocyte Esterase Urine Negative Negative    Bacteria Urine Few (A) None Seen /HPF    RBC Urine <1 <=2 /HPF    WBC Urine 1 <=5 /HPF    Narrative    Urine Culture not indicated   Troponin T, High Sensitivity   Result Value Ref Range    Troponin T, High Sensitivity <6 <=14 ng/L       Medications   nitroGLYcerin (NITROSTAT) sublingual tablet 0.4 mg (0.4 mg Sublingual $Given 8/13/24 1859)   heparin 25,000 units in 0.45% NaCl 250 mL ANTICOAGULANT infusion (850 Units/hr Intravenous $New  "Bag 8/13/24 0241)   nitroGLYcerin 50 mg in D5W 250 mL (adult std) infusion CENTRAL (10 mcg/min Intravenous $New Bag 8/13/24 1740)   sodium chloride 0.9 % bag 500 mL for CT scan flush use (90 mLs Intravenous $Given 8/13/24 1508)   iopamidol (ISOVUE-370) solution 100 mL (100 mLs Intravenous $Given 8/13/24 1508)   aspirin (ASA) chewable tablet 324 mg (324 mg Oral $Given 8/13/24 1631)       Assessments & Plan (with Medical Decision Making)  185/109 temp of 98.2 pulse of 72 respiratory rate of 20 SpO2 98% on room air  Mallorie Machado is a 48 year old female who presents to the emergency department today complaining of left-sided chest pain and headache.  Patient reports that this morning she woke up around 9 AM with a headache that has been coming and going with ringing in her ears.  She does not typically get headaches and this she describes as coming and going in severity, unrelieved by taking Excedrin.  When the headache did not go away she checked her blood pressure and noticed that it was 166/90 175/99 and 197/109.  Patient reports that her blood pressure is normally pretty good at home, she does not take medication for this.  She also has been experiencing intermittent moderate chest pain that she reports is \"annoying\" that wraps around to the left side of her back.  She has been feeling short of breath.  She has had nausea on and off since yesterday without vomiting.  No sweating.  Patient smokes almost a pack of cigarettes a day.  Reports that she has been told she has had a murmur, has had an echo in the past in which they told her that she had some calcium blockages on her heart at that time.  She is not dizzy or lightheaded, no vision changes numbness or tingling.  No other focal neurological deficits.  Patient is alert and oriented and nondistressed.  No focal neurological deficits  Complaining of left sided chest pain wraps around to her back, down her left arm, started early afternoon  Labs & Radiology " "results interpreted by radiologist:   ED Course as of 08/13/24 2012   Tue Aug 13, 2024   1413 XR Chest Port 1 View  Impression:   No acute abnormality. No evidence of acute or active cardiopulmonary  disease     1413 Hemoglobin 16.9, hematocrit 49.4, WBC 7.7   1413 CBC with platelets differential(!)  Hemoglobin 16.9, hematocrit 49.4, WBC 7.7   1430 Troponin T, High Sensitivity  6   1438 Comprehensive metabolic panel(!)  stable   1508 Nt probnp inpatient (BNP)  40   1522 UA with Microscopic reflex to Culture(!)  unremarkable   1523 CT Head w/o Contrast  IMPRESSION:   No acute intracranial abnormality.      Sinonasal disease with mucosal thickening of the ethmoid air cells.     1527 CTA Chest with Contrast  IMPRESSION:   No acute abnormality.     1700 Troponin T, High Sensitivity  normal   1711 Lipase  29      Meds: asa   nitroglycerin SL- with chest pain relief.   Vitals:    08/13/24 1915 08/13/24 1930 08/13/24 1945 08/13/24 2000   BP: (!) 144/90 (!) 145/93 (!) 154/99 (!) 154/101   Pulse: 61 62 60 58   Resp:       Temp:       TempSrc:       SpO2: 94% 93% 93% 94%   Weight:       Height:          3:44 PM patient is continuing to complain of left-sided chest pain that goes up to her jaw.  With negative troponin will repeat, stable EKG without changes. Chest pain comes and goes at rest, no change with activity. Nitroglycerin helped with her pain. Patient tells me it is \"moderate\" maybe a 3/10.   She is a smoker, recent lipid panel 2 months ago,: cholesterol 182, amadou 125 , HDL: 131; she is not on cholesterol medication- on pravastatin, she does not take antihypertensive medications. No previous heart attack, heart score 4  5:20 PM consulted with cardiology at [facility], Dr. Hendrix, who advised a heparin gtt, nitroglycerin gtt for unstable angina  5:26 PM  has a cardiology, tele bed. Awaiting hospitalist call back for report, acceptance. Patient and  agreeable to transfer.   6:04 PM Dr. Escamilla " accepted patient transfer to [facility], pending bed availability from stat doc   Patient reports Pain control  with nitroglycerin gtt.   9:00 PM End of shift report given to Dr. Hernandez, who will resume care pending transfer to [place]. Patient reports chest pain 1/10 on nitroglycerin gtt.      I have reviewed the nursing notes.    I have reviewed the findings, diagnosis, plan and need for follow up with the patient.    Medical Decision Making  The patient's presentation was of high complexity (an acute health issue posing potential threat to life or bodily function).    The patient's evaluation involved:  review of external note(s) from 1 sources (see separate area of note for details)  review of 3+ test result(s) ordered prior to this encounter (see separate area of note for details)  ordering and/or review of 3+ test(s) in this encounter (see separate area of note for details)  discussion of management or test interpretation with another health professional (see separate area of note for details)    The patient's management necessitated high risk (a decision regarding hospitalization).        New Prescriptions    No medications on file       Final diagnoses:   Unstable angina (H)       8/13/2024   [facility] Melinda, APRN CNP  08/13/24 2043

## 2024-08-13 NOTE — PHARMACY-CONSULT NOTE
Pharmacist Heparin Infusion Review    -reviewed home medication list and no Xa inhibitors or LMWH identified  -no other orders for heparin are currently ordered    Leann Juarez RP on 8/13/2024 at 5:32 PM

## 2024-08-13 NOTE — ED TRIAGE NOTES
"ED Nursing Triage Note (General)   ________________________________    Mallorie Machado is a 48 year old Female that presents to triage via private vehicle with complaints of hypertension, tinnitus, headache, and nausea.  Patient states she took Excedrin at 0900 with no relief of sx.  Patient states this is associated with intermittent chest pain that began this morning and radiates into her arm and back.  Patient states hx of murmur with an echo completed which showed calcium buildup on her heart. Patient states she does not take BP medications.   Significant symptoms had onset 24 hour(s) ago.  BP (!) 185/109   Pulse 72   Temp 98.2  F (36.8  C) (Tympanic)   Resp 20   Ht 1.575 m (5' 2\")   Wt 70.8 kg (156 lb)   LMP 10/01/2004 (Approximate)   SpO2 98%   BMI 28.53 kg/m    Vital signs:  Temp: 98.2  F (36.8  C) Temp src: Tympanic BP: (!) 185/109 Pulse: 72   Resp: 20 SpO2: 98 %     Height: 157.5 cm (5' 2\") Weight: 70.8 kg (156 lb)  Estimated body mass index is 28.53 kg/m  as calculated from the following:    Height as of this encounter: 1.575 m (5' 2\").    Weight as of this encounter: 70.8 kg (156 lb).  PRE HOSPITAL PRIOR LIVING SITUATION Spouse      Triage Assessment (Adult)       Row Name 08/13/24 2889          Triage Assessment    Airway WDL WDL        Respiratory WDL    Respiratory WDL WDL        Skin Circulation/Temperature WDL    Skin Circulation/Temperature WDL WDL        Cardiac WDL    Cardiac WDL X        Peripheral/Neurovascular WDL    Peripheral Neurovascular WDL WDL     Capillary Refill, General less than/equal to 3 secs        Cognitive/Neuro/Behavioral WDL    Cognitive/Neuro/Behavioral WDL WDL        [place] Coma Scale    Best Eye Response 4-->(E4) spontaneous     Best Motor Response 6-->(M6) obeys commands     Best Verbal Response 5-->(V5) oriented     Sanjiv Coma Scale Score 15                     "

## 2024-08-14 NOTE — ED PROVIDER NOTES
History     Chief Complaint   Patient presents with    Hypertension    Chest Pain     HPI  Mallorie Machado is a 48 year old female who assumed care of at shift change.  Unstable angina.    On heparin drip and nitroglycerin drip.   Smoker.     No issues for the first few hours of my shift.  She was transferred to higher level of care.    Allergies:  Allergies   Allergen Reactions    Propoxyphene N-Apap Anaphylaxis     Other reaction(s): Throat Swelling/Closing    Amoxil [Amoxicillin] Other (See Comments), Nausea and Vomiting and GI Disturbance     Lethargic    Atorvastatin Muscle Pain (Myalgia)     Myalgias    Hydrocodone-Acetaminophen Itching and Nausea    Meperidine Itching     No hives    Morphine      Other reaction(s): Erythema  Face & hands    Morphine And Codeine Itching and Other (See Comments)     Face & hands      Other reaction(s): Erythema Face & hands    Oxycodone-Acetaminophen Itching       Problem List:    Patient Active Problem List    Diagnosis Date Noted    Healthcare maintenance 08/22/2023     Priority: Medium    Allergic rhinitis due to pollen 01/30/2018     Priority: Medium    Contact dermatitis and eczema 01/30/2018     Priority: Medium     Overview:   recurrent      Dysmenorrhea 01/30/2018     Priority: Medium     Overview:   pelvic pain      Hypothyroidism 01/30/2018     Priority: Medium    Pain in joint, forearm 01/30/2018     Priority: Medium     Overview:   chronic      Impaired fasting glucose 09/13/2013     Priority: Medium    Trigeminal neuritis 04/20/2013     Priority: Medium    Herniated cervical disc 01/13/2012     Priority: Medium    Endometriosis 10/29/2010     Priority: Medium    Degeneration of cervical intervertebral disc 07/27/2010     Priority: Medium        Past Medical History:    Past Medical History:   Diagnosis Date    Allergic rhinitis due to pollen     Cyst of ovary     Dermatitis     Dysmenorrhea     Dysthymic disorder     Endometriosis     Hypothyroidism     Injury of  lower back     Low back pain     Obesity     Other cervical disc degeneration, unspecified cervical region     Other cervical disc displacement, unspecified cervical region     Personal history of other diseases of the female genital tract     Uncomplicated asthma        Past Surgical History:    Past Surgical History:   Procedure Laterality Date    ARTHROSCOPY KNEE Right 10/9/2023    Procedure: Arthroscopy knee, PARTIAL SYNOVECTOMY;  Surgeon: Daniel Vázquez MD;  Location: [facility] OR    ATTEMPTED ARTHROSCOPY      06,left wrist arthroscopy with debridement - Dr. Mahmood, [facility]    COLONOSCOPY  2023    F/U  normal    COLONOSCOPY N/A 2023    Procedure: Colonoscopy;  Surgeon: Dorian Schrader MD;  Location: [facility] OR    FUSION CERVICAL ANTERIOR ONE LEVEL      ,C5-6    HYSTERECTOMY TOTAL ABDOMINAL, BILATERAL SALPINGO-OOPHORECTOMY, COMBINED      ,bilateral salpingo-oophorectomy    LAPAROSCOPY DIAGNOSTIC (GENERAL)      ,    OTHER SURGICAL HISTORY       x 2,QSR135,VAGINAL DELIVERY    OTHER SURGICAL HISTORY      2012,2054,REMOVAL OF FOREIGN BODY,ACDF C4-5 and C6-7 with hardware removal [Other][[[       Family History:    Family History   Problem Relation Age of Onset    Diabetes Mother         Diabetes,Type II    Other - See Comments Mother         hypothyroidism    Alzheimer Disease Mother 56         in 2024    Diabetes Sister         Diabetes,DM-I,  of splenic injury/infarct       Social History:  Marital Status:   [2]  Social History     Tobacco Use    Smoking status: Every Day     Current packs/day: 0.75     Average packs/day: 0.8 packs/day for 24.0 years (18.0 ttl pk-yrs)     Types: Cigarettes     Passive exposure: Past    Smokeless tobacco: Never    Tobacco comments:     Passive exposure in childhood home.    Vaping Use    Vaping status: Never Used   Substance Use Topics    Alcohol use: Not Currently     Alcohol/week: 0.0 standard drinks of alcohol     "Drug use: Never        Medications:    celecoxib (CELEBREX) 100 MG capsule  Cetirizine HCl (ZYRTEC ALLERGY PO)  levothyroxine (SYNTHROID/LEVOTHROID) 112 MCG tablet  montelukast (SINGULAIR) 10 MG tablet  pravastatin (PRAVACHOL) 10 MG tablet          Review of Systems    Physical Exam   BP: (!) 185/109  Pulse: 72  Temp: 98.2  F (36.8  C)  Resp: 20  Height: 157.5 cm (5' 2\")  Weight: 70.8 kg (156 lb)  SpO2: 98 %      Physical Exam    ED Course     ED Course as of 08/14/24 0411   Tue Aug 13, 2024   1413 XR Chest Port 1 View  Impression:   No acute abnormality. No evidence of acute or active cardiopulmonary  disease     1413 Hemoglobin 16.9, hematocrit 49.4, WBC 7.7   1413 CBC with platelets differential(!)  Hemoglobin 16.9, hematocrit 49.4, WBC 7.7   1430 Troponin T, High Sensitivity  6   1438 Comprehensive metabolic panel(!)  stable   1508 Nt probnp inpatient (BNP)  40   1522 UA with Microscopic reflex to Culture(!)  unremarkable   1523 CT Head w/o Contrast  IMPRESSION:   No acute intracranial abnormality.      Sinonasal disease with mucosal thickening of the ethmoid air cells.     1527 CTA Chest with Contrast  IMPRESSION:   No acute abnormality.     1700 Troponin T, High Sensitivity  normal   1711 Lipase  29   2105 Sign out at shift change. Unstable angina. Plan for transfer to higher level of care. HTN and HA. Awaiting transportation.     Procedures              Critical Care time:  none               Results for orders placed or performed during the hospital encounter of 08/13/24 (from the past 24 hour(s))   EKG 12-lead, tracing only   Result Value Ref Range    Systolic Blood Pressure  mmHg    Diastolic Blood Pressure  mmHg    Ventricular Rate 68 BPM    Atrial Rate 68 BPM    AK Interval 136 ms    QRS Duration 88 ms     ms    QTc 476 ms    P Axis 36 degrees    R AXIS -24 degrees    T Axis 25 degrees    Interpretation ECG       Sinus rhythm  Normal ECG  When compared with ECG of 06-Dec-2021 11:33,  No significant " change was found  Confirmed by MD RAM DARIN ([id number]) on 8/13/2024 7:21:19 PM     CBC with platelets differential    Narrative    The following orders were created for panel order CBC with platelets differential.  Procedure                               Abnormality         Status                     ---------                               -----------         ------                     CBC with platelets and d...[[id number]]  Abnormal            Final result                 Please view results for these tests on the individual orders.   Comprehensive metabolic panel   Result Value Ref Range    Sodium 140 135 - 145 mmol/L    Potassium 3.8 3.4 - 5.3 mmol/L    Carbon Dioxide (CO2) 26 22 - 29 mmol/L    Anion Gap 12 7 - 15 mmol/L    Urea Nitrogen 16.3 6.0 - 20.0 mg/dL    Creatinine 0.71 0.51 - 0.95 mg/dL    GFR Estimate >90 >60 mL/min/1.73m2    Calcium 9.5 8.8 - 10.4 mg/dL    Chloride 102 98 - 107 mmol/L    Glucose 107 (H) 70 - 99 mg/dL    Alkaline Phosphatase 72 40 - 150 U/L    AST 33 0 - 45 U/L    ALT 30 0 - 50 U/L    Protein Total 7.8 6.4 - 8.3 g/dL    Albumin 4.5 3.5 - 5.2 g/dL    Bilirubin Total 0.4 <=1.2 mg/dL   Troponin T, High Sensitivity   Result Value Ref Range    Troponin T, High Sensitivity <6 <=14 ng/L   Nt probnp inpatient (BNP)   Result Value Ref Range    N terminal Pro BNP Inpatient 40 0 - 450 pg/mL   CBC with platelets and differential   Result Value Ref Range    WBC Count 7.7 4.0 - 11.0 10e3/uL    RBC Count 5.52 (H) 3.80 - 5.20 10e6/uL    Hemoglobin 16.9 (H) 11.7 - 15.7 g/dL    Hematocrit 49.4 (H) 35.0 - 47.0 %    MCV 90 78 - 100 fL    MCH 30.6 26.5 - 33.0 pg    MCHC 34.2 31.5 - 36.5 g/dL    RDW 12.0 10.0 - 15.0 %    Platelet Count 284 150 - 450 10e3/uL    % Neutrophils 44 %    % Lymphocytes 36 %    % Monocytes 8 %    % Eosinophils 11 %    % Basophils 1 %    % Immature Granulocytes 0 %    NRBCs per 100 WBC 0 <1 /100    Absolute Neutrophils 3.4 1.6 - 8.3 10e3/uL    Absolute Lymphocytes 2.8 0.8 - 5.3  10e3/uL    Absolute Monocytes 0.6 0.0 - 1.3 10e3/uL    Absolute Eosinophils 0.9 (H) 0.0 - 0.7 10e3/uL    Absolute Basophils 0.1 0.0 - 0.2 10e3/uL    Absolute Immature Granulocytes 0.0 <=0.4 10e3/uL    Absolute NRBCs 0.0 10e3/uL   Lipase   Result Value Ref Range    Lipase 29 13 - 60 U/L   CBC with platelets   Result Value Ref Range    WBC Count 7.7 4.0 - 11.0 10e3/uL    RBC Count 5.52 (H) 3.80 - 5.20 10e6/uL    Hemoglobin 16.9 (H) 11.7 - 15.7 g/dL    Hematocrit 49.4 (H) 35.0 - 47.0 %    MCV 90 78 - 100 fL    MCH 30.6 26.5 - 33.0 pg    MCHC 34.2 31.5 - 36.5 g/dL    RDW 12.0 10.0 - 15.0 %    Platelet Count 284 150 - 450 10e3/uL   Extra Tube    Narrative    The following orders were created for panel order Extra Tube.  Procedure                               Abnormality         Status                     ---------                               -----------         ------                     Extra Blue Top Tube[[id number]]                              Final result               Extra Red Top Tube[[id number]]                               Final result                 Please view results for these tests on the individual orders.   Extra Blue Top Tube   Result Value Ref Range    Hold Specimen jic    Extra Red Top Tube   Result Value Ref Range    Hold Specimen JIC    XR Chest Port 1 View    Narrative    Procedure:XR CHEST PORT 1 VIEW    Clinical history:Female, 48 years, chest pain, SOB    Technique: Single view was obtained.    Comparison: 8/30/2016    Findings: The cardiac silhouette is within normal limits.. The  pulmonary vasculature is within normal limits.    The lungs are clear. Bony structures are unremarkable.  No  pneumothorax.      Impression    Impression:   No acute abnormality. No evidence of acute or active cardiopulmonary  disease.    AMANDA SANTANA MD         SYSTEM ID:  [id number]   CT Head w/o Contrast    Narrative    PROCEDURE: CT HEAD W/O CONTRAST   8/13/2024 3:10 PM    HISTORY:Female, age,  48 years, , ,  new onset headache, hypertension-;  Headache; r/o Subarachnoid hemorrhage; Sudden severe headache; Age >=  40 years; Symptom onset < 6 hours or unknown    COMPARISON:No relevant prior imaging.    TECHNIQUE: CT of the brain without contrast. Axial; sagittal and  coronal reconstructed images were reviewed.  This facility minimizes radiation dose by adjusting the mA and/or kV  according to each patient size.  This CT scan was performed using one or more the following dose  reduction techniques:  -Automated exposure control,  -Adjustment of the mA and/or kV according to patient's size, and/or,  -Use of iterative reconstruction technique.      FINDINGS: Ventricles and sulci are normal in size and shape. Gray and  white matter demonstrate normal differentiation.    Scattered mucosal thickening of the ethmoid air cells.. No acute  fracture.         Impression    IMPRESSION:   No acute intracranial abnormality.     Sinonasal disease with mucosal thickening of the ethmoid air cells.             AMANDA SANTANA MD         SYSTEM ID:  [id number]   CTA Chest with Contrast    Narrative    CTA CHEST WITH CONTRAST  8/13/2024 3:13 PM    CLINICAL HISTORY: Female, age 48 years,  left sided chest pain, back  pain, hypertension;    Comparison:  No relevant prior imaging.    TECHNIQUE:  CT angiogram was performed of the chest with IV contrast.  Axial, sagittal and coronal images were reviewed. 3-D MIP images were  used to optimize evaluation of the vessels and lung nodule  conspicuity.   This facility minimizes radiation dose by adjusting the mA and/or kV  according to each patient size.  This CT scan was performed using one or more the following dose  reduction techniques:  -Automated exposure control,  -Adjustment of the mA and/or kV according to patient's size, and/or,  -Use of iterative reconstruction technique.      FINDINGS:   Good quality CTA demonstrates no evidence of pulmonary embolus.  Thoracic aorta is normal in contour and  demonstrates small volume of  calcified plaque. The heart and great vessels demonstrate no acute  abnormality.     The lungs demonstrates a mild emphysema and dependent atelectasis  without evidence of well-defined consolidation. Thyroid gland is  hypoplastic. Postoperative changes seen in the lower cervical spine.  Esophagus and visualized portions of the upper abdomen are  unremarkable. Bony structures demonstrate no acute abnormality.        Impression    IMPRESSION:   No acute abnormality.    AMANDA SANTANA MD         SYSTEM ID:  [id number]   UA with Microscopic reflex to Culture    Specimen: Urine, Clean Catch   Result Value Ref Range    Color Urine Yellow Colorless, Straw, Light Yellow, Yellow    Appearance Urine Clear Clear    Glucose Urine Negative Negative mg/dL    Bilirubin Urine Negative Negative    Ketones Urine Negative Negative mg/dL    Specific Gravity Urine 1.014 1.000 - 1.030    Blood Urine Negative Negative    pH Urine 6.5 5.0 - 9.0    Protein Albumin Urine Negative Negative mg/dL    Urobilinogen Urine Normal Normal, 2.0 mg/dL    Nitrite Urine Negative Negative    Leukocyte Esterase Urine Negative Negative    Bacteria Urine Few (A) None Seen /HPF    RBC Urine <1 <=2 /HPF    WBC Urine 1 <=5 /HPF    Narrative    Urine Culture not indicated   Troponin T, High Sensitivity   Result Value Ref Range    Troponin T, High Sensitivity <6 <=14 ng/L       Medications   sodium chloride 0.9 % bag 500 mL for CT scan flush use (90 mLs Intravenous $Given 8/13/24 1508)   iopamidol (ISOVUE-370) solution 100 mL (100 mLs Intravenous $Given 8/13/24 1508)   aspirin (ASA) chewable tablet 324 mg (324 mg Oral $Given 8/13/24 1631)       Assessments & Plan (with Medical Decision Making)     I have reviewed the nursing notes.    I have reviewed the findings, diagnosis, plan and need for follow up with the patient.           Medical Decision Making  The patient's presentation was of high complexity (a chronic illness severe  exacerbation, progression, or side effect of treatment).    The patient's evaluation involved:  history and exam without other MDM data elements    The patient's management necessitated high risk (a decision regarding hospitalization).        Discharge Medication List as of 8/13/2024 11:21 PM          Final diagnoses:   Unstable angina (H)   Transfer to higher level of care.  On nitroglycerin drip and heparin drip.  Please see Melinda Perez's note for further details on patient workup in the emergency room.    8/13/2024   [facility]       Trerie Hernandez, DO  08/13/24 2204       Terrie Hernandez, DO  08/14/24 0330       Terrie Hernandez, DO  08/14/24 041

## 2024-08-14 NOTE — ED NOTES
Writer called [facility] for transport to [place], per [place] there are no crews available at this time, unknown eta; stated they are going to try for mutual aid.

## 2024-08-28 ENCOUNTER — OFFICE VISIT (OUTPATIENT)
Dept: INTERNAL MEDICINE | Facility: OTHER | Age: 49
End: 2024-08-28
Payer: COMMERCIAL

## 2024-08-28 VITALS
WEIGHT: 161.38 LBS | OXYGEN SATURATION: 96 % | SYSTOLIC BLOOD PRESSURE: 144 MMHG | BODY MASS INDEX: 29.7 KG/M2 | RESPIRATION RATE: 18 BRPM | TEMPERATURE: 97.4 F | DIASTOLIC BLOOD PRESSURE: 82 MMHG | HEART RATE: 70 BPM | HEIGHT: 62 IN

## 2024-08-28 DIAGNOSIS — I20.89 CHRONIC STABLE ANGINA (H): ICD-10-CM

## 2024-08-28 DIAGNOSIS — I10 PRIMARY HYPERTENSION: ICD-10-CM

## 2024-08-28 DIAGNOSIS — Z09 HOSPITAL DISCHARGE FOLLOW-UP: Primary | ICD-10-CM

## 2024-08-28 DIAGNOSIS — K21.00 GASTROESOPHAGEAL REFLUX DISEASE WITH ESOPHAGITIS WITHOUT HEMORRHAGE: ICD-10-CM

## 2024-08-28 PROCEDURE — 99214 OFFICE O/P EST MOD 30 MIN: CPT

## 2024-08-28 RX ORDER — ASPIRIN 81 MG/1
81 TABLET ORAL
COMMUNITY
Start: 2024-08-16

## 2024-08-28 RX ORDER — FAMOTIDINE 20 MG/1
20 TABLET, FILM COATED ORAL 2 TIMES DAILY
Qty: 120 TABLET | Refills: 1 | Status: SHIPPED | OUTPATIENT
Start: 2024-08-28

## 2024-08-28 RX ORDER — HYDROCHLOROTHIAZIDE 25 MG/1
12.5 TABLET ORAL DAILY
Qty: 60 TABLET | Refills: 3 | Status: SHIPPED | OUTPATIENT
Start: 2024-08-28

## 2024-08-28 RX ORDER — AMLODIPINE BESYLATE 2.5 MG/1
1 TABLET ORAL DAILY
COMMUNITY
Start: 2024-08-16

## 2024-08-28 RX ORDER — PRAVASTATIN SODIUM 20 MG
20 TABLET ORAL
COMMUNITY
Start: 2024-08-15

## 2024-08-28 RX ORDER — ACETAMINOPHEN 500 MG
1000 TABLET ORAL
COMMUNITY
Start: 2024-08-15

## 2024-08-28 ASSESSMENT — ANXIETY QUESTIONNAIRES
GAD7 TOTAL SCORE: 0
1. FEELING NERVOUS, ANXIOUS, OR ON EDGE: NOT AT ALL
IF YOU CHECKED OFF ANY PROBLEMS ON THIS QUESTIONNAIRE, HOW DIFFICULT HAVE THESE PROBLEMS MADE IT FOR YOU TO DO YOUR WORK, TAKE CARE OF THINGS AT HOME, OR GET ALONG WITH OTHER PEOPLE: NOT DIFFICULT AT ALL
8. IF YOU CHECKED OFF ANY PROBLEMS, HOW DIFFICULT HAVE THESE MADE IT FOR YOU TO DO YOUR WORK, TAKE CARE OF THINGS AT HOME, OR GET ALONG WITH OTHER PEOPLE?: NOT DIFFICULT AT ALL
6. BECOMING EASILY ANNOYED OR IRRITABLE: NOT AT ALL
7. FEELING AFRAID AS IF SOMETHING AWFUL MIGHT HAPPEN: NOT AT ALL
7. FEELING AFRAID AS IF SOMETHING AWFUL MIGHT HAPPEN: NOT AT ALL
3. WORRYING TOO MUCH ABOUT DIFFERENT THINGS: NOT AT ALL
4. TROUBLE RELAXING: NOT AT ALL
2. NOT BEING ABLE TO STOP OR CONTROL WORRYING: NOT AT ALL
GAD7 TOTAL SCORE: 0
GAD7 TOTAL SCORE: 0
5. BEING SO RESTLESS THAT IT IS HARD TO SIT STILL: NOT AT ALL

## 2024-08-28 ASSESSMENT — PATIENT HEALTH QUESTIONNAIRE - PHQ9
10. IF YOU CHECKED OFF ANY PROBLEMS, HOW DIFFICULT HAVE THESE PROBLEMS MADE IT FOR YOU TO DO YOUR WORK, TAKE CARE OF THINGS AT HOME, OR GET ALONG WITH OTHER PEOPLE: NOT DIFFICULT AT ALL
SUM OF ALL RESPONSES TO PHQ QUESTIONS 1-9: 2
SUM OF ALL RESPONSES TO PHQ QUESTIONS 1-9: 2

## 2024-08-28 ASSESSMENT — PAIN SCALES - GENERAL: PAINLEVEL: MILD PAIN (3)

## 2024-08-28 NOTE — NURSING NOTE
"Chief Complaint   Patient presents with    [facility] F/U     [facility] 08/13 thru 08/14/24 chest pain, angiography completed       Initial BP (!) 144/82 (BP Location: Right arm, Patient Position: Sitting, Cuff Size: Adult Regular)   Pulse 70   Temp 97.4  F (36.3  C) (Tympanic)   Resp 18   Ht 1.575 m (5' 2\")   Wt 73.2 kg (161 lb 6 oz)   LMP 10/01/2004 (Approximate)   SpO2 96%   BMI 29.52 kg/m   Estimated body mass index is 29.52 kg/m  as calculated from the following:    Height as of this encounter: 1.575 m (5' 2\").    Weight as of this encounter: 73.2 kg (161 lb 6 oz).  Medication Review: complete    The next two questions are to help us understand your food security.  If you are feeling you need any assistance in this area, we have resources available to support you today.          6/9/2024   SDOH- Food Insecurity   Within the past 12 months, did you worry that your food would run out before you got money to buy more? N   Within the past 12 months, did the food you bought just not last and you didn t have money to get more? N            Health Care Directive:  Patient does not have a Health Care Directive or Living Will: Discussed advance care planning with patient; however, patient declined at this time.    Zaida Mar LPN      "

## 2024-08-28 NOTE — PROGRESS NOTES
Subjective   Mallorie is a 48 year old, presenting for the following health issues:  Hospital F/U ([facility] 08/13 thru 08/14/24 chest pain, angiography completed)        6/10/2024     9:53 AM   Additional Questions   Roomed by Annemarie Lipscomb CMA([facility])       ICD-10-CM    1. Hospital discharge follow-up  Z09       2. Chronic stable angina (H24)  I20.89       3. Primary hypertension  I10 hydrochlorothiazide (HYDRODIURIL) 25 MG tablet      4. Gastroesophageal reflux disease with esophagitis without hemorrhage  K21.00 famotidine (PEPCID) 20 MG tablet      Mallorie is a 48-year-old female who presents to the clinic today for hospital discharge follow-up.  She had presented to the [facility] clinic and hospital ER with chest pain that was felt to be unstable.  She was transferred to high-level care at [facility] in [place] for further workup.  Workup included angiogram which showed mild coronary artery disease.  EKG was normal.  Troponins were normal.  CT was negative for pulmonary emboli.  Blood pressure was elevated and she was started on Norvasc and her pravastatin was increased to 40 mg daily.  She does continue to have chronic daily chest pain which she feels is worse at rest.  These episodes have even woken her in the middle the night.  Will trial PPI.  If her symptoms do not resolve I would recommend she follow with cardiology for chronic, stable, angina.  She plans to follow-up with her PCP regarding her blood pressure.  I did add hydrochlorothiazide to her treatment regimen.  She is just on a small dose of Norvasc, she preferred to add in a diuretic which she had previously been on and had success.  She is to return to the clinic or ER for any red flag symptoms or questions.    History of Present Illness       Hypertension: She presents for follow up of hypertension.  She does check blood pressure  regularly outside of the clinic. Outside blood pressures have been over 140/90. She follows a low salt  diet.     She eats 2-3 servings of fruits and vegetables daily.She consumes 0 sweetened beverage(s) daily.She exercises with enough effort to increase her heart rate 9 or less minutes per day.  She exercises with enough effort to increase her heart rate 3 or less days per week.   She is taking medications regularly.       Hospital Follow-up Visit:    Hospital/Nursing Home/ Rehab Facility: [facility] and [facility]  Date of Admission: 8/14/24  Date of Discharge: 8/15/24  Reason(s) for Admission: unstable angina  Was the patient in the ICU or did the patient experience delirium during hospitalization?  No  Do you have any other stressors you would like to discuss with your provider? No    Problems taking medications regularly:  None  Medication changes since discharge: None  Problems adhering to non-medication therapy:  None    Summary of hospitalization:  [facility] discharge summary reviewed  As well as [facility] records  Diagnostic Tests/Treatments reviewed.  Follow up needed: patient to follow with PCP for continued BP follow up as I changed her medications today  Other Healthcare Providers Involved in Patient s Care:         None  Update since discharge: improved.    Plan of care communicated with patient     Hypertension Follow-up    Do you check your blood pressure regularly outside of the clinic? Yes   Are you following a low salt diet? No  Are your blood pressures ever more than 140 on the top number (systolic) OR more   than 90 on the bottom number (diastolic), for example 140/90? Yes  How many servings of fruits and vegetables do you eat daily?  2-3  On average, how many sweetened beverages do you drink each day (Examples: soda, juice, sweet tea, etc.  Do NOT count diet or artificially sweetened beverages)?   NA  How many days per week do you exercise enough to make your heart beat faster? 3 or less  How many minutes a day do you exercise enough to make your  "heart beat faster? 9 or less  How many days per week do you miss taking your medication? 0    Review of Systems  CONSTITUTIONAL: NEGATIVE for fever, chills, change in weight  INTEGUMENTARY/SKIN: NEGATIVE for worrisome rashes, moles or lesions  EYES: NEGATIVE for vision changes or irritation  ENT/MOUTH: NEGATIVE for ear, mouth and throat problems  RESP: NEGATIVE for significant cough or SOB  CV: chest pain/pressure  GI: NEGATIVE for nausea, abdominal pain, heartburn, or change in bowel habits  : NEGATIVE for frequency, dysuria, or hematuria  MUSCULOSKELETAL: NEGATIVE for significant arthralgias or myalgia  NEURO: NEGATIVE for weakness, dizziness or paresthesias  ENDOCRINE: NEGATIVE for temperature intolerance, skin/hair changes  HEME: NEGATIVE for bleeding problems  PSYCHIATRIC: NEGATIVE for changes in mood or affect      Objective    BP (!) 144/82 (BP Location: Right arm, Patient Position: Sitting, Cuff Size: Adult Regular)   Pulse 70   Temp 97.4  F (36.3  C) (Tympanic)   Resp 18   Ht 1.575 m (5' 2\")   Wt 73.2 kg (161 lb 6 oz)   LMP 10/01/2004 (Approximate)   SpO2 96%   BMI 29.52 kg/m    Body mass index is 29.52 kg/m .  Physical Exam  Constitutional:       Appearance: Normal appearance.   HENT:      Head: Normocephalic.      Nose: Nose normal.   Cardiovascular:      Rate and Rhythm: Normal rate and regular rhythm.      Pulses: Normal pulses.      Heart sounds: Murmur heard.   Pulmonary:      Effort: Pulmonary effort is normal.      Breath sounds: Normal breath sounds.   Abdominal:      General: Bowel sounds are normal.   Musculoskeletal:      Right lower leg: No edema.      Left lower leg: No edema.   Skin:     General: Skin is warm and dry.      Findings: No lesion or rash.   Neurological:      Mental Status: She is alert and oriented to person, place, and time.   Psychiatric:         Behavior: Behavior normal.          Signed Electronically by: UMA Romero CNP    "

## 2024-08-28 NOTE — PATIENT INSTRUCTIONS
Continue to monitor blood pressure at home.  I added hydrochlorothiazide to your blood pressure treatment.   Follow up with Dr. Navarrete    I also added Pepcid to rule out reflux causing the chest pain. If the chest pain continues with this after a month or two you may stop this. I would then ask for a referral to cardiology for chronic stable angina.     Return to the clinic/ER with any concerns.

## 2024-09-23 ENCOUNTER — OFFICE VISIT (OUTPATIENT)
Dept: FAMILY MEDICINE | Facility: OTHER | Age: 49
End: 2024-09-23
Attending: FAMILY MEDICINE
Payer: COMMERCIAL

## 2024-09-23 VITALS
TEMPERATURE: 96.6 F | RESPIRATION RATE: 20 BRPM | SYSTOLIC BLOOD PRESSURE: 122 MMHG | WEIGHT: 161 LBS | DIASTOLIC BLOOD PRESSURE: 78 MMHG | BODY MASS INDEX: 29.45 KG/M2 | HEART RATE: 68 BPM

## 2024-09-23 DIAGNOSIS — J30.1 SEASONAL ALLERGIC RHINITIS DUE TO POLLEN: ICD-10-CM

## 2024-09-23 DIAGNOSIS — I10 ESSENTIAL HYPERTENSION: Primary | ICD-10-CM

## 2024-09-23 LAB
ANION GAP SERPL CALCULATED.3IONS-SCNC: 9 MMOL/L (ref 7–15)
BUN SERPL-MCNC: 16.3 MG/DL (ref 6–20)
CALCIUM SERPL-MCNC: 9.6 MG/DL (ref 8.8–10.4)
CHLORIDE SERPL-SCNC: 98 MMOL/L (ref 98–107)
CREAT SERPL-MCNC: 0.59 MG/DL (ref 0.51–0.95)
EGFRCR SERPLBLD CKD-EPI 2021: >90 ML/MIN/1.73M2
GLUCOSE SERPL-MCNC: 95 MG/DL (ref 70–99)
HCO3 SERPL-SCNC: 29 MMOL/L (ref 22–29)
MAGNESIUM SERPL-MCNC: 2.1 MG/DL (ref 1.7–2.3)
POTASSIUM SERPL-SCNC: 3.9 MMOL/L (ref 3.4–5.3)
SODIUM SERPL-SCNC: 136 MMOL/L (ref 135–145)

## 2024-09-23 PROCEDURE — 36415 COLL VENOUS BLD VENIPUNCTURE: CPT | Mod: ZL | Performed by: PHYSICIAN ASSISTANT

## 2024-09-23 PROCEDURE — 83735 ASSAY OF MAGNESIUM: CPT | Mod: ZL | Performed by: PHYSICIAN ASSISTANT

## 2024-09-23 PROCEDURE — 80048 BASIC METABOLIC PNL TOTAL CA: CPT | Mod: ZL | Performed by: PHYSICIAN ASSISTANT

## 2024-09-23 PROCEDURE — 99214 OFFICE O/P EST MOD 30 MIN: CPT | Performed by: PHYSICIAN ASSISTANT

## 2024-09-23 RX ORDER — MONTELUKAST SODIUM 10 MG/1
10 TABLET ORAL
Qty: 90 TABLET | Refills: 4 | Status: SHIPPED | OUTPATIENT
Start: 2024-09-23

## 2024-09-23 ASSESSMENT — PAIN SCALES - GENERAL: PAINLEVEL: MILD PAIN (3)

## 2024-09-23 NOTE — PATIENT INSTRUCTIONS
Lab work on average will return in 1-2 hours, unless listed otherwise below (your provider will typically review & provide you with results and recommendations within 1 day):  - CMP/BMP - kidney and electrolyte lab. CMP adds in liver function  - Magnesium

## 2024-09-23 NOTE — NURSING NOTE
"Patient presents to the clinic for hydrochlorothiazide follow up.    FOOD SECURITY SCREENING QUESTIONS:    The next two questions are to help us understand your food security.  If you are feeling you need any assistance in this area, we have resources available to support you today.    Hunger Vital Signs:  Within the past 12 months we worried whether our food would run out before we got money to buy more. Never  Within the past 12 months the food we bought just didn't last and we didn't have money to get more. Never    Advance Care Directive on file? no  Advance Care Directive provided to patient? Declined-has completed form at home.      Chief Complaint   Patient presents with    Clinic Care Coordination - Follow-up     medication       Initial /78 (BP Location: Right arm, Patient Position: Sitting, Cuff Size: Adult Regular)   Pulse 68   Temp (!) 96.6  F (35.9  C) (Tympanic)   Resp 20   Wt 73 kg (161 lb)   LMP 10/01/2004 (Approximate)   BMI 29.45 kg/m   Estimated body mass index is 29.45 kg/m  as calculated from the following:    Height as of 8/28/24: 1.575 m (5' 2\").    Weight as of this encounter: 73 kg (161 lb).  Medication Reconciliation: complete        Cynthia Lynch LPN     "

## 2024-09-23 NOTE — PROGRESS NOTES
"  Assessment & Plan       ICD-10-CM    1. Essential hypertension  I10 Basic Metabolic Panel     Magnesium     Basic Metabolic Panel     Magnesium      2. Seasonal allergic rhinitis due to pollen  J30.1 montelukast (SINGULAIR) 10 MG tablet        Essential hypertension - at goal. Updated magnesium, potassium and renal function today as potassium was on lower end of normal upon hospital discharge in August 2024. These are all stable: magnesium 2.1, potassium 3.9, GFR 90, creatinine 0.59. Continue hydrochlorothiazide 12.5 mg daily + Amlodipine 2.5 mg daily. Goal /85 or less. Low salt and mediterranean diet recommended.   Seasonal allergies - stable. Refill Singulair.     BMI  Estimated body mass index is 29.45 kg/m  as calculated from the following:    Height as of 8/28/24: 1.575 m (5' 2\").    Weight as of this encounter: 73 kg (161 lb).   Weight management plan: Discussed healthy diet and exercise guidelines    See Patient Instructions    Return in about 9 months (around 6/23/2025) for Physical.    Fabiola Nobles is a 48 year old, presenting for the following health issues:  Clinic Care Coordination - Follow-up (medication)        9/23/2024     8:46 AM   Additional Questions   Roomed by vivien rosenthal lpn     History of Present Illness       Hypertension: She presents for follow up of hypertension.  She does check blood pressure  regularly outside of the clinic. Outside blood pressures have been over 140/90. She does not follow a low salt diet.     She eats 0-1 servings of fruits and vegetables daily.She consumes 0 sweetened beverage(s) daily.She exercises with enough effort to increase her heart rate 20 to 29 minutes per day.  She exercises with enough effort to increase her heart rate 4 days per week.   She is taking medications regularly.     Mallorie presents to the clinic today for repeat blood pressure medication check. She was seen last on 8/28/24 for hospital discharge follow up - CECILIA Hobbs 8/13/24-8/14/24 for " angiography secondary to chest pain/unstable angina. Angiogram - mild coronary disease.   - Started on Amlodipine 2.5 mg daily and increased Pravastatin to 40 mg daily during hospital course. BP elevated at hospital follow up, therefore, hydrochlorothiazide 12.5 mg daily added.   - Blood pressures are much improved, 120-130 systolic, rare > 140. Diastolic 60-80.  Blood pressures greater than 140/90.  She is not following a low-salt diet.  - Seasonal allergies, occurred during the fall season, utilizes Singulair during this timeframe, due for refill.  No adverse effects to current regimen.    Review of Systems  Constitutional, HEENT, cardiovascular, pulmonary, GI, , musculoskeletal, neuro, skin, endocrine and psych systems are negative, except as otherwise noted.        Objective    /78 (BP Location: Right arm, Patient Position: Sitting, Cuff Size: Adult Regular)   Pulse 68   Temp (!) 96.6  F (35.9  C) (Tympanic)   Resp 20   Wt 73 kg (161 lb)   LMP 10/01/2004 (Approximate)   BMI 29.45 kg/m    Body mass index is 29.45 kg/m .  Physical Exam   GENERAL: alert and no distress  EYES: Eyes grossly normal to inspection  RESP: lungs clear to auscultation - no rales, rhonchi or wheezes  CV: regular rate and rhythm, normal S1 S2, no S3 or S4, no murmur, click or rub, no peripheral edema  MS: no gross musculoskeletal defects noted, no edema  SKIN: no suspicious lesions or rashes  PSYCH: mentation appears normal, affect normal/bright    Results for orders placed or performed in visit on 09/23/24   Basic Metabolic Panel     Status: Normal   Result Value Ref Range    Sodium 136 135 - 145 mmol/L    Potassium 3.9 3.4 - 5.3 mmol/L    Chloride 98 98 - 107 mmol/L    Carbon Dioxide (CO2) 29 22 - 29 mmol/L    Anion Gap 9 7 - 15 mmol/L    Urea Nitrogen 16.3 6.0 - 20.0 mg/dL    Creatinine 0.59 0.51 - 0.95 mg/dL    GFR Estimate >90 >60 mL/min/1.73m2    Calcium 9.6 8.8 - 10.4 mg/dL    Glucose 95 70 - 99 mg/dL   Magnesium      Status: Normal   Result Value Ref Range    Magnesium 2.1 1.7 - 2.3 mg/dL       Signed Electronically by: Melinda Escamilla PA-C

## 2024-10-12 NOTE — ED PROVIDER NOTES
History     Chief Complaint   Patient presents with     Numbness     Dizziness     HPI  Mallorie Machado is a 46 year old female who presents to the ED for evaluation of numbness and dizziness. Pt here with family with c/o not feeling well since Saturday and symptoms getting worse with dizziness, overall numbness and weakness with intermittent chest pain, pt appears anxious, VSS, pt brought back into ER to be evaluated.      Allergies:  Allergies   Allergen Reactions     Hydrocodone-Acetaminophen Itching and Nausea     Meperidine Itching     No hives     Morphine      Other reaction(s): Erythema  Face & hands     Oxycodone-Acetaminophen Itching     Propoxyphene N-Apap      Other reaction(s): Throat Swelling/Closing       Problem List:    Patient Active Problem List    Diagnosis Date Noted     Allergic rhinitis due to pollen 01/30/2018     Priority: Medium     Contact dermatitis and eczema 01/30/2018     Priority: Medium     Overview:   recurrent       Dysmenorrhea 01/30/2018     Priority: Medium     Overview:   pelvic pain       Hypothyroidism 01/30/2018     Priority: Medium     Pain in joint, forearm 01/30/2018     Priority: Medium     Overview:   chronic       Impaired fasting glucose 09/13/2013     Priority: Medium     Trigeminal neuritis 04/20/2013     Priority: Medium     Herniated cervical disc 01/13/2012     Priority: Medium     Otitis media, right 12/20/2010     Priority: Medium     Endometriosis 10/29/2010     Priority: Medium     Degeneration of cervical intervertebral disc 07/27/2010     Priority: Medium        Past Medical History:    Past Medical History:   Diagnosis Date     Allergic rhinitis due to pollen      Cyst of ovary      Dermatitis      Dysmenorrhea      Dysthymic disorder      Endometriosis      Hypothyroidism      Injury of lower back      Low back pain      Obesity      Other cervical disc degeneration, unspecified cervical region      Other cervical disc displacement, unspecified cervical  pt c/o syncopal episode, lowered self to floor. Denies chets pain, sob Hx CAD x1 stent 2022, HTN, HLD on plavix. region      Personal history of other diseases of the female genital tract      Uncomplicated asthma        Past Surgical History:    Past Surgical History:   Procedure Laterality Date     ATTEMPTED ARTHROSCOPY      06,left wrist arthroscopy with debridement - Dr. Mahmood, [place]     FUSION CERVICAL ANTERIOR ONE LEVEL      ,C5-6     HYSTERECTOMY TOTAL ABDOMINAL, BILATERAL SALPINGO-OOPHORECTOMY, COMBINED      ,bilateral salpingo-oophorectomy     LAPAROSCOPY DIAGNOSTIC (GENERAL)      ,     OTHER SURGICAL HISTORY       x 2,GLO826,VAGINAL DELIVERY     OTHER SURGICAL HISTORY      2012,2054,REMOVAL OF FOREIGN BODY,ACDF C4-5 and C6-7 with hardware removal [Other][[[       Family History:    Family History   Problem Relation Age of Onset     Diabetes Mother         Diabetes,Type II     Other - See Comments Mother         hypothyroidism     Diabetes Sister         Diabetes,DM-I,  of splenic injury/infarct       Social History:  Marital Status:   [2]  Social History     Tobacco Use     Smoking status: Current Every Day Smoker     Packs/day: 0.50     Types: Cigarettes     Smokeless tobacco: Never Used   Vaping Use     Vaping Use: Never used   Substance Use Topics     Alcohol use: Not Currently     Alcohol/week: 0.0 standard drinks     Drug use: Never        Medications:    levothyroxine (SYNTHROID/LEVOTHROID) 112 MCG tablet          Review of Systems   Constitutional: Negative for chills and fever.   HENT: Negative for congestion.    Eyes: Negative for visual disturbance.   Respiratory: Negative for chest tightness and shortness of breath.    Cardiovascular: Positive for chest pain.   Gastrointestinal: Negative for abdominal pain.   Genitourinary: Negative for hematuria.   Musculoskeletal: Negative for back pain.   Skin: Negative for rash and wound.   Neurological: Positive for dizziness and numbness. Negative for syncope.   Hematological: Does not bruise/bleed easily.  "  Psychiatric/Behavioral: Negative for confusion.       Physical Exam   BP: (!) 171/102  Pulse: 66  Temp: 98  F (36.7  C)  Resp: 18  Height: 157.5 cm (5' 2\")  Weight: 59.4 kg (131 lb)  SpO2: 98 %      Physical Exam  Constitutional:       General: She is not in acute distress.     Appearance: She is well-developed. She is not diaphoretic.   HENT:      Head: Normocephalic and atraumatic.   Eyes:      General: No scleral icterus.     Extraocular Movements: Extraocular movements intact.      Conjunctiva/sclera: Conjunctivae normal.      Pupils: Pupils are equal, round, and reactive to light.   Cardiovascular:      Rate and Rhythm: Normal rate and regular rhythm.   Pulmonary:      Effort: Pulmonary effort is normal.      Breath sounds: Normal breath sounds.   Abdominal:      Palpations: Abdomen is soft.      Tenderness: There is no abdominal tenderness.   Musculoskeletal:         General: No deformity.      Cervical back: Neck supple.   Lymphadenopathy:      Cervical: No cervical adenopathy.   Skin:     General: Skin is warm and dry.      Coloration: Skin is not jaundiced.      Findings: No rash.   Neurological:      Mental Status: She is alert and oriented to person, place, and time. Mental status is at baseline.   Psychiatric:         Mood and Affect: Mood normal.         Behavior: Behavior normal.         Thought Content: Thought content normal.         Judgment: Judgment normal.         ED Course                 Procedures         EKG read at 1134. HR 72, NSR, no ST changes.     Critical Care time:  none               Results for orders placed or performed during the hospital encounter of 12/06/21 (from the past 24 hour(s))   Symptomatic Influenza A/B & SARS-CoV2 (COVID-19) Virus PCR Multiplex Nose    Specimen: Nose; Swab   Result Value Ref Range    Influenza A PCR Negative Negative    Influenza B PCR Negative Negative    RSV PCR Negative Negative    SARS CoV2 PCR Positive (A) Negative    Narrative    Testing was " performed using the Xpert Xpress CoV2/Flu/RSV Assay on the [employer] GeneXpert Instrument. This test should be ordered for the detection of SARS-CoV-2 and influenza viruses in individuals who meet clinical and/or epidemiological criteria. Test performance is unknown in asymptomatic patients. This test is for in vitro diagnostic use under the FDA EUA for laboratories certified under CLIA to perform high or moderate complexity testing. This test has not been FDA cleared or approved. A negative result does not rule out the presence of PCR inhibitors in the specimen or target RNA in concentration below the limit of detection for the assay. If only one viral target is positive but coinfection with multiple targets is suspected, the sample should be re-tested with another FDA cleared, approved, or authorized test, if coinfection would change clinical management. This test was validated by the [facility] Walk-in Appointment Scheduler. These laboratories are certified under the Clinical  Laboratory Improvement Amendments of 1988 (CLIA-88) as qualified to perform high complexity laboratory testing.   CBC with platelets differential    Narrative    The following orders were created for panel order CBC with platelets differential.  Procedure                               Abnormality         Status                     ---------                               -----------         ------                     CBC with platelets and d...[[id number]]  Abnormal            Final result                 Please view results for these tests on the individual orders.   Comprehensive metabolic panel   Result Value Ref Range    Sodium 138 134 - 144 mmol/L    Potassium 4.1 3.5 - 5.1 mmol/L    Chloride 104 98 - 107 mmol/L    Carbon Dioxide (CO2) 26 21 - 31 mmol/L    Anion Gap 8 3 - 14 mmol/L    Urea Nitrogen 19 7 - 25 mg/dL    Creatinine 0.65 0.60 - 1.20 mg/dL    Calcium 9.2 8.6 - 10.3 mg/dL    Glucose 114 (H) 70 - 105 mg/dL    Alkaline Phosphatase 39 34 -  104 U/L    AST 14 13 - 39 U/L    ALT 14 7 - 52 U/L    Protein Total 7.4 6.4 - 8.9 g/dL    Albumin 4.4 3.5 - 5.7 g/dL    Bilirubin Total 0.4 0.3 - 1.0 mg/dL    GFR Estimate >90 >60 mL/min/1.73m2   Troponin I   Result Value Ref Range    Troponin I <2.4 0.0 - 34.0 pg/mL   CBC with platelets and differential   Result Value Ref Range    WBC Count 4.7 4.0 - 11.0 10e3/uL    RBC Count 5.42 (H) 3.80 - 5.20 10e6/uL    Hemoglobin 16.6 (H) 11.7 - 15.7 g/dL    Hematocrit 48.3 (H) 35.0 - 47.0 %    MCV 89 78 - 100 fL    MCH 30.6 26.5 - 33.0 pg    MCHC 34.4 31.5 - 36.5 g/dL    RDW 12.4 10.0 - 15.0 %    Platelet Count 175 150 - 450 10e3/uL    % Neutrophils 50 %    % Lymphocytes 34 %    % Monocytes 9 %    % Eosinophils 6 %    % Basophils 1 %    % Immature Granulocytes 0 %    NRBCs per 100 WBC 0 <1 /100    Absolute Neutrophils 2.3 1.6 - 8.3 10e3/uL    Absolute Lymphocytes 1.6 0.8 - 5.3 10e3/uL    Absolute Monocytes 0.4 0.0 - 1.3 10e3/uL    Absolute Eosinophils 0.3 0.0 - 0.7 10e3/uL    Absolute Basophils 0.0 0.0 - 0.2 10e3/uL    Absolute Immature Granulocytes 0.0 <=0.4 10e3/uL    Absolute NRBCs 0.0 10e3/uL       Medications - No data to display    Assessments & Plan (with Medical Decision Making)   Pt nontoxic in NAD. Heart, lung, bowel sounds normal, abd soft, nontender to palpation, nondistended. VSS, afebrile. She has some very vague complaints, differential is broad.     CN 2-12 intact, good finger to nose and heel to shin testing. She is ambulating without signs of ataxia.     EKG and lab work appears well.     She is positive for COVID. This can explain her broad symptoms and general ill feeling.  However, from a Covid standpoint, she appears to be in no respiratory distress oxygen saturation around 98% is not in complaint of shortness of breath or cough.  She will continue with conservative treatment at home.    Strict return precautions are given to the pt, they will return if symptoms are worsening or concerning. The pt  understands and agrees with the plan and they are discharged.     Uzair Comer PA-C    I have reviewed the nursing notes.    I have reviewed the findings, diagnosis, plan and need for follow up with the patient.       Current Discharge Medication List          Final diagnoses:   Infection due to 2019 novel coronavirus       12/6/2021   [facility]     Uzair Comer PA  12/06/21 [id number]

## 2025-02-05 ENCOUNTER — OFFICE VISIT (OUTPATIENT)
Dept: FAMILY MEDICINE | Facility: OTHER | Age: 50
End: 2025-02-05
Payer: COMMERCIAL

## 2025-02-05 VITALS
RESPIRATION RATE: 18 BRPM | SYSTOLIC BLOOD PRESSURE: 107 MMHG | DIASTOLIC BLOOD PRESSURE: 71 MMHG | HEIGHT: 63 IN | TEMPERATURE: 98 F | HEART RATE: 67 BPM | BODY MASS INDEX: 29.02 KG/M2 | WEIGHT: 163.8 LBS | OXYGEN SATURATION: 95 %

## 2025-02-05 DIAGNOSIS — Z20.828 EXPOSURE TO INFLUENZA: ICD-10-CM

## 2025-02-05 DIAGNOSIS — J10.1 INFLUENZA A: Primary | ICD-10-CM

## 2025-02-05 DIAGNOSIS — R05.1 ACUTE COUGH: ICD-10-CM

## 2025-02-05 LAB
FLUAV RNA SPEC QL NAA+PROBE: POSITIVE
FLUBV RNA RESP QL NAA+PROBE: NEGATIVE
RSV RNA SPEC NAA+PROBE: NEGATIVE
SARS-COV-2 RNA RESP QL NAA+PROBE: NEGATIVE

## 2025-02-05 PROCEDURE — 87637 SARSCOV2&INF A&B&RSV AMP PRB: CPT | Mod: ZL

## 2025-02-05 RX ORDER — OSELTAMIVIR PHOSPHATE 75 MG/1
75 CAPSULE ORAL 2 TIMES DAILY
Qty: 10 CAPSULE | Refills: 0 | Status: SHIPPED | OUTPATIENT
Start: 2025-02-05 | End: 2025-02-10

## 2025-02-05 RX ORDER — ALBUTEROL SULFATE 90 UG/1
2 INHALANT RESPIRATORY (INHALATION) EVERY 6 HOURS PRN
Qty: 18 G | Refills: 0 | Status: SHIPPED | OUTPATIENT
Start: 2025-02-05

## 2025-02-05 ASSESSMENT — PAIN SCALES - GENERAL: PAINLEVEL_OUTOF10: NO PAIN (0)

## 2025-02-05 NOTE — NURSING NOTE
"Chief Complaint   Patient presents with    Cough     Non productive     Chest congestion   Patient presents to the rapid clinic today for concerns of a cough and chest congestion. Patient notes her  was positive for Influenza yesterday. Symptoms started last night.     Initial /71 (BP Location: Left arm, Patient Position: Sitting, Cuff Size: Adult Regular)   Pulse 67   Temp 98  F (36.7  C) (Temporal)   Resp 18   Ht 1.588 m (5' 2.5\")   Wt 74.3 kg (163 lb 12.8 oz)   LMP 10/01/2004 (Approximate)   SpO2 95%   BMI 29.48 kg/m   Estimated body mass index is 29.48 kg/m  as calculated from the following:    Height as of this encounter: 1.588 m (5' 2.5\").    Weight as of this encounter: 74.3 kg (163 lb 12.8 oz).  Medication Review: complete    The next two questions are to help us understand your food security.  If you are feeling you need any assistance in this area, we have resources available to support you today.          2/5/2025   SDOH- Food Insecurity   Within the past 12 months, did you worry that your food would run out before you got money to buy more? N   Within the past 12 months, did the food you bought just not last and you didn t have money to get more? N         Health Care Directive:  Patient does not have a Health Care Directive: Discussed advance care planning with patient; however, patient declined at this time.    Everette Rosario      "

## 2025-02-05 NOTE — PROGRESS NOTES
ASSESSMENT/PLAN:    I have reviewed the nursing notes.  I have reviewed the findings, diagnosis, plan and need for follow up with the patient.    1. Influenza A (Primary)  2. Exposure to influenza  3. Acute cough  - albuterol (PROAIR HFA/PROVENTIL HFA/VENTOLIN HFA) 108 (90 Base) MCG/ACT inhaler; Inhale 2 puffs into the lungs every 6 hours as needed for shortness of breath, cough or wheezing.  Dispense: 18 g; Refill: 0  - Influenza A/B, RSV and SARS-CoV2 PCR (COVID-19) Nose  - oseltamivir (TAMIFLU) 75 MG capsule; Take 1 capsule (75 mg) by mouth 2 times daily for 5 days.  Dispense: 10 capsule; Refill: 0    Patient presents with an acute illness with systemic symptoms including body aches.  Patient's vitals are stable and she appears nontoxic.  Multiplex test was positive for influenza A.  Will treat with Tamiflu.  Also provided patient with a prescription for an albuterol inhaler to use as needed for shortness of breath and wheezing.  Advised patient to quarantine for 5 days from symptom onset and then be fever free for 24 hours. Discussed symptomatic treatment - Encouraged fluids, salt water gargles, honey (only if greater than 1 year in age due to risk of botulism), elevation, humidifier, sinus rinse/netti pot, lozenges, tea, topical vapor rub, popsicles, rest, etc. May use over-the-counter Tylenol or ibuprofen PRN.    Discussed warning signs/symptoms indicative of need to f/u    Follow up if symptoms persist or worsen or concerns    I explained my diagnostic considerations and recommendations to the patient, who voiced understanding and agreement with the treatment plan. All questions were answered. We discussed potential side effects of any prescribed or recommended therapies, as well as expectations for response to treatments.    Malcolm Doyle, UMA CNP  2/5/2025  1:42 PM    HPI:    Mallorie Machado is a 49 year old female  who presents to [facility] today for concerns of URI symptoms    URI, x 1  day    Symptoms:  No fevers or chills.   YES: +  sore throat/pharyngitis/tonsillitis.   YES: +  allergy/URI Symptoms  No muffled sounds/change in hearing  No sensation of fullness in ear(s)  No ringing in ears/tinnitus  No dizziness  YES: +  congestion (head/nasal/chest)  YES: +  cough/productive cough  No post nasal drip   YES: +  headache  No sinus pain/pressure  YES: +  myalgias  No otalgia  No rash  Activity Level Changes: Yes: fatigue  Appetite/Liquid Intake Changes: Yes: decreased  Changes to Bowel Habits: No  Changes to Bladder Habits: No  Additional Symptoms to Report: Yes: chest tightness, wheezing, shortness of breath  Prior workup: No    Treatments tried: Tylenol/Ibuprofen, OTC Cough med, Fluids, and Rest    Site of exposure: home to   Type of exposure: flu    Other Pertinent History: tobacco use    Allergies: reviewed    PCP: Landon    Past Medical History:   Diagnosis Date    Allergic rhinitis due to pollen     No Comments Provided    Cyst of ovary     1994    Dermatitis     scalp    Dysmenorrhea     No Comments Provided    Dysthymic disorder     No Comments Provided    Endometriosis     No Comments Provided    Hypothyroidism     No Comments Provided    Injury of lower back     02/07,Cervical disc injury, C5-6, with improvement (work comp related)    Low back pain     No Comments Provided    Obesity     No Comments Provided    Other cervical disc degeneration, unspecified cervical region     No Comments Provided    Other cervical disc displacement, unspecified cervical region     No Comments Provided    Personal history of other diseases of the female genital tract     G2, P2-0-0-2, vaginal deliveries    Uncomplicated asthma     No Comments Provided     Past Surgical History:   Procedure Laterality Date    ARTHROSCOPY KNEE Right 10/9/2023    Procedure: Arthroscopy knee, PARTIAL SYNOVECTOMY;  Surgeon: Daniel Vázquez MD;  Location: [facility] OR    ATTEMPTED ARTHROSCOPY      1/20/06,left wrist arthroscopy  with debridement - Dr. Mahmood, [facility]    COLONOSCOPY  08/28/2023    F/U 2033 normal    COLONOSCOPY N/A 8/28/2023    Procedure: Colonoscopy;  Surgeon: Dorian Schrader MD;  Location: [facility] OR    FUSION CERVICAL ANTERIOR ONE LEVEL      2008,C5-6    HYSTERECTOMY TOTAL ABDOMINAL, BILATERAL SALPINGO-OOPHORECTOMY, COMBINED      11/04,bilateral salpingo-oophorectomy    LAPAROSCOPY DIAGNOSTIC (GENERAL)      6/04,02/07    OTHER SURGICAL HISTORY       x 2,HKS931,VAGINAL DELIVERY    OTHER SURGICAL HISTORY      1/19/2012,205448,REMOVAL OF FOREIGN BODY,ACDF C4-5 and C6-7 with hardware removal [Other][[[     Social History     Tobacco Use    Smoking status: Every Day     Current packs/day: 0.25     Average packs/day: 0.3 packs/day for 24.0 years (6.0 ttl pk-yrs)     Types: Cigarettes     Passive exposure: Past    Smokeless tobacco: Never    Tobacco comments:     Passive exposure in childhood home.    Substance Use Topics    Alcohol use: Not Currently     Alcohol/week: 0.0 standard drinks of alcohol     Current Outpatient Medications   Medication Sig Dispense Refill    acetaminophen (TYLENOL) 500 MG tablet Take 1,000 mg by mouth.      amLODIPine (NORVASC) 2.5 MG tablet Take 1 tablet by mouth daily.      aspirin 81 MG EC tablet Take 81 mg by mouth.      Cetirizine HCl (ZYRTEC ALLERGY PO) Take by mouth daily as needed      hydrochlorothiazide (HYDRODIURIL) 25 MG tablet Take 0.5 tablets (12.5 mg) by mouth daily. 60 tablet 3    levothyroxine (SYNTHROID/LEVOTHROID) 112 MCG tablet Take 1 tablet (112 mcg) by mouth daily 90 tablet 4    montelukast (SINGULAIR) 10 MG tablet Take 1 tablet (10 mg) by mouth nightly as needed (allergies). 90 tablet 4    pravastatin (PRAVACHOL) 20 MG tablet 20 mg.      famotidine (PEPCID) 20 MG tablet Take 1 tablet (20 mg) by mouth 2 times daily. (Patient not taking: Reported on 2/5/2025) 120 tablet 1     Allergies   Allergen Reactions    Propoxyphene Swelling     Throat closes.    Propoxyphene N-Apap  "Anaphylaxis     Other reaction(s): Throat Swelling/Closing    Amoxil [Amoxicillin] Other (See Comments), Nausea and Vomiting and GI Disturbance     Lethargic    Atorvastatin Muscle Pain (Myalgia)     Myalgias    Hydrocodone-Acetaminophen Itching and Nausea    Hydromorphone Itching     Red blotches on hands/face    Meperidine Itching     No hives    Morphine      Other reaction(s): Erythema  Face & hands    Morphine And Codeine Itching and Other (See Comments)     Face & hands      Other reaction(s): Erythema Face & hands    Oxycodone-Acetaminophen Itching    Pollen Extract Other (See Comments)     Itchy eyes     Past medical history, past surgical history, current medications and allergies reviewed and accurate to the best of my knowledge.      ROS:  Refer to HPI    /71 (BP Location: Left arm, Patient Position: Sitting, Cuff Size: Adult Regular)   Pulse 67   Temp 98  F (36.7  C) (Temporal)   Resp 18   Ht 1.588 m (5' 2.5\")   Wt 74.3 kg (163 lb 12.8 oz)   LMP 10/01/2004 (Approximate)   SpO2 95%   BMI 29.48 kg/m      EXAM:  General Appearance: Well appearing 49 year old female, appropriate appearance for age. No acute distress   Ears: Left TM intact, translucent with bony landmarks appreciated, no erythema, moderate effusion and bulging, no purulence.  Right TM intact, translucent with bony landmarks appreciated, no erythema, moderate effusion and bulging, no purulence.  Left auditory canal clear.  Right auditory canal clear.  Normal external ears, non tender.  Eyes: conjunctivae normal without erythema or irritation, corneas clear, no drainage or crusting, no eyelid swelling, pupils equal   Oropharynx: moist mucous membranes, posterior pharynx without erythema, tonsils symmetric and 1+, no erythema, no exudates or petechiae, no post nasal drip seen, no trismus, voice clear.    Nose:  Bilateral nares: no erythema, no edema, no drainage or congestion   Neck: supple without adenopathy  Respiratory: normal " chest wall and respirations.  Normal effort.  Clear to auscultation bilaterally, no wheezing, crackles or rhonchi.  No increased work of breathing.  No cough appreciated.  Cardiac: RRR with no murmurs  Musculoskeletal:  Equal movement of bilateral upper extremities.  Equal movement of bilateral lower extremities.  Normal gait.    Dermatological: no rashes noted of exposed skin  Neuro: Alert and oriented to person, place, and time.    Psychological: normal affect, alert, oriented, and pleasant.     Labs:  Results for orders placed or performed in visit on 02/05/25   Influenza A/B, RSV and SARS-CoV2 PCR (COVID-19) Nose     Status: Abnormal    Specimen: Nose; Swab   Result Value Ref Range    Influenza A PCR Positive (A) Negative    Influenza B PCR Negative Negative    RSV PCR Negative Negative    SARS CoV2 PCR Negative Negative    Narrative    Testing was performed using the Xpert Xpress CoV2/Flu/RSV Assay on the Cepheid GeneXpert Instrument. This test should be ordered for the detection of SARS-CoV2, influenza, and RSV viruses in individuals with signs and symptoms of respiratory tract infection. This test is for in vitro diagnostic use under the [place] FDA for laboratories certified under CLIA to perform high or moderate complexity testing. This test has been [place] FDA cleared. A negative result does not rule out the presence of PCR inhibitors in the specimen or target RNA in concentration below the limit of detection for the assay. If only one viral target is positive but coinfection with multiple targets is suspected, the sample should be re-tested with another FDA cleared, approved, or authorized test, if coninfection would change clinical management. This test was validated by the [facility]. These laboratories are certified under the Clinical Laboratory Improvement Amendments of 1988 (CLIA-88) as qualified to perfom high complexity laboratory testing.

## 2025-02-10 ENCOUNTER — E-VISIT (OUTPATIENT)
Dept: URGENT CARE | Facility: CLINIC | Age: 50
End: 2025-02-10
Payer: COMMERCIAL

## 2025-02-10 DIAGNOSIS — B96.89 ACUTE BACTERIAL SINUSITIS: Primary | ICD-10-CM

## 2025-02-10 DIAGNOSIS — J01.90 ACUTE BACTERIAL SINUSITIS: Primary | ICD-10-CM

## 2025-02-10 RX ORDER — DOXYCYCLINE HYCLATE 100 MG
100 TABLET ORAL 2 TIMES DAILY
Qty: 14 TABLET | Refills: 0 | Status: SHIPPED | OUTPATIENT
Start: 2025-02-10 | End: 2025-02-17

## 2025-02-10 NOTE — PATIENT INSTRUCTIONS
Acute Sinusitis: Care Instructions  Overview     Acute sinusitis is an inflammation of the mucous membranes inside the nose and sinuses. Sinuses are the hollow spaces in your skull around the eyes and nose. Acute sinusitis often follows a cold. Acute sinusitis causes thick, discolored mucus that drains from the nose or down the back of the throat. It also can cause pain and pressure in your head and face along with a stuffy or blocked nose.  In most cases, sinusitis gets better on its own in 1 to 2 weeks. But some mild symptoms may last for several weeks. Sometimes antibiotics are needed if there is a bacterial infection.  Follow-up care is a key part of your treatment and safety. Be sure to make and go to all appointments, and call your doctor if you are having problems. It's also a good idea to know your test results and keep a list of the medicines you take.  How can you care for yourself at home?  Use saline (saltwater) nasal washes. This can help keep your nasal passages open and wash out mucus and allergens.  You can buy saline nose washes at a grocery store or drugstore. Follow the instructions on the package.  You can make your own at home. Add 1 teaspoon of non-iodized salt and 1 teaspoon of baking soda to 2 cups of distilled or boiled and cooled water. Fill a squeeze bottle or a nasal cleansing pot (such as a neti pot) with the nasal wash. Then put the tip into your nostril, and lean over the sink. With your mouth open, gently squirt the liquid. Repeat on the other side.  Try a decongestant nasal spray like oxymetazoline (Afrin). Do not use it for more than 3 days in a row. Using it for more than 3 days can make your congestion worse.  If needed, take an over-the-counter pain medicine, such as acetaminophen (Tylenol), ibuprofen (Advil, Motrin), or naproxen (Aleve). Read and follow all instructions on the label.  If the doctor prescribed antibiotics, take them as directed. Do not stop taking them just  "because you feel better. You need to take the full course of antibiotics.  Be careful when taking over-the-counter cold or flu medicines and Tylenol at the same time. Many of these medicines have acetaminophen, which is Tylenol. Read the labels to make sure that you are not taking more than the recommended dose. Too much acetaminophen (Tylenol) can be harmful.  Try a steroid nasal spray. It may help with your symptoms.  Breathe warm, moist air. You can use a steamy shower, a hot bath, or a sink filled with hot water. Avoid cold, dry air. Using a humidifier in your home may help. Follow the directions for cleaning the machine.  When should you call for help?   Call your doctor now or seek immediate medical care if:    You have new or worse swelling, redness, or pain in your face or around one or both of your eyes.     You have double vision or a change in your vision.     You have a high fever.     You have a severe headache and a stiff neck.     You have mental changes, such as feeling confused or much less alert.   Watch closely for changes in your health, and be sure to contact your doctor if:    You are not getting better as expected.   Where can you learn more?  Go to https://www.[email or web address].net/patiented  Enter I933 in the search box to learn more about \"Acute Sinusitis: Care Instructions.\"  Current as of: September 27, 2023  Content Version: 14.3    2024 [employer].   Care instructions adapted under license by your healthcare professional. If you have questions about a medical condition or this instruction, always ask your healthcare professional. [employer] disclaims any warranty or liability for your use of this information.    Dear Mallorie Machado    After reviewing your responses, I've been able to diagnose you with Acute bacterial sinusitis.      Based on your responses and diagnosis, I have prescribed   Orders Placed This Encounter   Medications     doxycycline hyclate (VIBRA-TABS) " 100 MG tablet     Sig: Take 1 tablet (100 mg) by mouth 2 times daily for 7 days.     Dispense:  14 tablet     Refill:  0     May substitute any formulation of 100mg doxycycline available and best covered by insurance      to treat your symptoms. I have sent this to your pharmacy.?     It is also important to stay well hydrated, get lots of rest and take over-the-counter decongestants,?tylenol?or ibuprofen if you?are able to?take those medications per your primary care provider to help relieve discomfort.?     It is important that you take?all of?your prescribed medication even if your symptoms are improving after a few doses.? Taking?all of?your medicine helps prevent the symptoms from returning.?     If your symptoms worsen, you develop severe headache, vomiting, high fever (>102), or are not improving in 7 days, please contact your primary care provider for an appointment or visit any of our convenient Walk-in Care or Urgent Care Centers to be seen which can be found on our website?here.?     Thanks again for choosing?us?as your health care partner,?   ?  UMA Sequeira CNP?   Thank you for choosing us for your care. I have placed an order for a prescription so that you can start treatment. View your full visit summary for details by clicking on the link below. Your pharmacist will able to address any questions you may have about the medication.     If you're not feeling better within 5-7 days, please schedule an appointment.  You can schedule an appointment right here in [facility], or call [phone]  If the visit is for the same symptoms as your eVisit, we'll refund the cost of your eVisit if seen within seven days.

## 2025-06-01 DIAGNOSIS — E03.9 HYPOTHYROIDISM, UNSPECIFIED TYPE: ICD-10-CM

## 2025-06-03 RX ORDER — LEVOTHYROXINE SODIUM 112 UG/1
112 TABLET ORAL DAILY
Qty: 30 TABLET | Refills: 0 | Status: SHIPPED | OUTPATIENT
Start: 2025-06-03

## 2025-06-03 NOTE — TELEPHONE ENCOUNTER
[id number] sent Rx request for the following:      Requested Prescriptions   Pending Prescriptions Disp Refills    levothyroxine (SYNTHROID/LEVOTHROID) 112 MCG tablet [Pharmacy Med Name: LEVOTHYROXINE 112MCG TABLET] 90 tablet 4     Sig: TAKE 1 TABLET (112 MCG) BY MOUTH DAILY       Thyroid Protocol Failed - 6/3/2025 10:00 AM        Failed - Normal TSH on file in past 12 months     Recent Labs   Lab Test 06/10/24  1048   TSH 0.23*         Hypothyroidism, unspecified type [E03.9]     Last Prescription Date:   6/10/24  Last Fill Qty/Refills:         90, R-4    Last Office Visit:              6/10/24 (px)    Future Office visit:             Next 5 appointments (look out 90 days)      Jun 16, 2025 11:30 AM  (Arrive by 11:15 AM)  Adult Preventative Visit with Jerardo Navarrete MD  [facility] ([facility]) [address]  [place] [address] [phone]           Unable to complete prescription refill per RN Medication Refill Policy.     Tank Pro RN on 6/3/2025 at 10:01 AM

## 2025-07-05 DIAGNOSIS — E03.9 HYPOTHYROIDISM, UNSPECIFIED TYPE: ICD-10-CM

## 2025-07-08 RX ORDER — LEVOTHYROXINE SODIUM 112 UG/1
112 TABLET ORAL DAILY
Qty: 30 TABLET | Refills: 0 | Status: SHIPPED | OUTPATIENT
Start: 2025-07-08

## 2025-07-08 NOTE — TELEPHONE ENCOUNTER
[id number] sent Rx request for the following:      Requested Prescriptions   Pending Prescriptions Disp Refills    levothyroxine (SYNTHROID/LEVOTHROID) 112 MCG tablet [Pharmacy Med Name: LEVOTHYROXINE 112MCG TABLET] 30 tablet 0     Sig: TAKE 1 TABLET BY MOUTH DAILY       Thyroid Protocol Failed - 7/8/2025 10:27 AM        Failed - Normal TSH on file in past 12 months     Recent Labs   Lab Test 06/10/24  1048   TSH 0.23*           Hypothyroidism, unspecified type [E03.9]        Last Prescription Date:   6/3/25  Last Fill Qty/Refills:         30, R-0    Last Office Visit:              6/10/24 (px)    Future Office visit:             Next 5 appointments (look out 90 days)      Jul 11, 2025 1:30 PM  (Arrive by 1:15 PM)  Adult Preventative Visit with Jerardo Navarrete MD  [facility] ([facility]) [address]  [place] [address] [phone]           Unable to complete prescription refill per RN Medication Refill Policy.     Tank Pro RN on 7/8/2025 at 10:34 AM

## (undated) DEVICE — PAD FLOOR SURGISAFE 46X40" 84610

## (undated) DEVICE — TUBING SUCTION 10'X3/16" N510

## (undated) DEVICE — TUBING ARTHROSCOPY PUMP ARTHREX AR-6410

## (undated) DEVICE — COVER LIGHT HANDLE LT-F02

## (undated) DEVICE — SOL WATER 1500ML

## (undated) DEVICE — DRSG ABDOMINAL PAD UNSTERILE 5X9" 9190

## (undated) DEVICE — BUR ARTHREX COOLCUT DISSECTOR 4.0MMX13CM AR-8400DS

## (undated) DEVICE — BNDG ELASTIC 6"X5YDS UNSTERILE 6611-60

## (undated) DEVICE — GLOVE BIOGEL PI SZ 8.5 40885

## (undated) DEVICE — SUCTION MANIFOLD NEPTUNE 2 SYS 4 PORT 0702-020-000

## (undated) DEVICE — DRAPE TOP SURGICAL HD 59352

## (undated) DEVICE — DRSG GAUZE 4X4" 3033

## (undated) DEVICE — PREP CHLORAPREP 26ML TINTED ORANGE  260815

## (undated) DEVICE — PACK KNEE ARTHROSCOPY CUSTOM SOP15KAFCA

## (undated) DEVICE — ENDO KIT COMPLIANCE DYKENDOCMPLY

## (undated) DEVICE — DRSG KERLIX 2 1/4"X3YDS ROLL 6720

## (undated) DEVICE — ENDO BRUSH CHANNEL MASTER CLEANING 2-4.2MM BW-412T

## (undated) DEVICE — SLEEVE COMPRESSION SCD KNEE MED 74022

## (undated) DEVICE — DRAPE STERI U 1015

## (undated) DEVICE — GLOVE PROTEXIS POWDER FREE SMT 8.5 2D72PT85X

## (undated) DEVICE — DRSG ADAPTIC 3X3" 2012

## (undated) DEVICE — TOURNIQUET SGL  BLADDER 30"X4" BLUE 5921030135

## (undated) DEVICE — CAST PADDING 4" WEBRIL STERILE

## (undated) DEVICE — SU ETHILON 3-0 PS-2 18" 1669H

## (undated) RX ORDER — ASPIRIN 81 MG/1
TABLET, CHEWABLE ORAL
Status: DISPENSED
Start: 2024-08-13

## (undated) RX ORDER — FENTANYL CITRATE 50 UG/ML
INJECTION, SOLUTION INTRAMUSCULAR; INTRAVENOUS
Status: DISPENSED
Start: 2023-10-09

## (undated) RX ORDER — PROPOFOL 10 MG/ML
INJECTION, EMULSION INTRAVENOUS
Status: DISPENSED
Start: 2023-08-28

## (undated) RX ORDER — EPINEPHRINE 1 MG/ML
INJECTION INTRAMUSCULAR; INTRAVENOUS; SUBCUTANEOUS
Status: DISPENSED
Start: 2023-10-09

## (undated) RX ORDER — CEFAZOLIN SODIUM/WATER 2 G/20 ML
SYRINGE (ML) INTRAVENOUS
Status: DISPENSED
Start: 2023-10-09

## (undated) RX ORDER — BUPIVACAINE HYDROCHLORIDE 2.5 MG/ML
INJECTION, SOLUTION EPIDURAL; INFILTRATION; INTRACAUDAL
Status: DISPENSED
Start: 2023-10-09

## (undated) RX ORDER — NITROGLYCERIN 0.4 MG/1
TABLET SUBLINGUAL
Status: DISPENSED
Start: 2024-08-13

## (undated) RX ORDER — DIPHENHYDRAMINE HYDROCHLORIDE 50 MG/ML
INJECTION INTRAMUSCULAR; INTRAVENOUS
Status: DISPENSED
Start: 2023-10-09

## (undated) RX ORDER — DEXAMETHASONE SODIUM PHOSPHATE 4 MG/ML
INJECTION, SOLUTION INTRA-ARTICULAR; INTRALESIONAL; INTRAMUSCULAR; INTRAVENOUS; SOFT TISSUE
Status: DISPENSED
Start: 2023-10-09

## (undated) RX ORDER — PROPOFOL 10 MG/ML
INJECTION, EMULSION INTRAVENOUS
Status: DISPENSED
Start: 2023-10-09

## (undated) RX ORDER — NITROGLYCERIN 20 MG/100ML
INJECTION INTRAVENOUS
Status: DISPENSED
Start: 2024-08-13

## (undated) RX ORDER — ONDANSETRON 2 MG/ML
INJECTION INTRAMUSCULAR; INTRAVENOUS
Status: DISPENSED
Start: 2023-10-09

## (undated) RX ORDER — HEPARIN SODIUM 10000 [USP'U]/100ML
INJECTION, SOLUTION INTRAVENOUS
Status: DISPENSED
Start: 2024-08-13

## (undated) RX ORDER — GLYCOPYRROLATE 0.2 MG/ML
INJECTION, SOLUTION INTRAMUSCULAR; INTRAVENOUS
Status: DISPENSED
Start: 2023-10-09